# Patient Record
Sex: FEMALE | Race: BLACK OR AFRICAN AMERICAN | NOT HISPANIC OR LATINO | Employment: FULL TIME | URBAN - METROPOLITAN AREA
[De-identification: names, ages, dates, MRNs, and addresses within clinical notes are randomized per-mention and may not be internally consistent; named-entity substitution may affect disease eponyms.]

---

## 2017-09-25 ENCOUNTER — HOSPITAL ENCOUNTER (EMERGENCY)
Facility: HOSPITAL | Age: 27
Discharge: HOME | End: 2017-09-25
Attending: EMERGENCY MEDICINE
Payer: OTHER GOVERNMENT

## 2017-09-25 VITALS
BODY MASS INDEX: 19.24 KG/M2 | OXYGEN SATURATION: 99 % | WEIGHT: 98 LBS | HEART RATE: 84 BPM | HEIGHT: 60 IN | DIASTOLIC BLOOD PRESSURE: 71 MMHG | TEMPERATURE: 98 F | SYSTOLIC BLOOD PRESSURE: 121 MMHG | RESPIRATION RATE: 18 BRPM

## 2017-09-25 DIAGNOSIS — R07.89 CHEST PAIN, NON-CARDIAC: Primary | ICD-10-CM

## 2017-09-25 DIAGNOSIS — H81.10 VERTIGO, BENIGN POSITIONAL, UNSPECIFIED LATERALITY: ICD-10-CM

## 2017-09-25 DIAGNOSIS — R07.9 CHEST PAIN: ICD-10-CM

## 2017-09-25 LAB
B-HCG UR QL: NEGATIVE
CTP QC/QA: YES

## 2017-09-25 PROCEDURE — 99284 EMERGENCY DEPT VISIT MOD MDM: CPT | Mod: 25

## 2017-09-25 PROCEDURE — 81025 URINE PREGNANCY TEST: CPT | Performed by: EMERGENCY MEDICINE

## 2017-09-25 PROCEDURE — 93005 ELECTROCARDIOGRAM TRACING: CPT

## 2017-09-25 PROCEDURE — 93010 ELECTROCARDIOGRAM REPORT: CPT | Mod: ,,, | Performed by: INTERNAL MEDICINE

## 2017-09-25 RX ORDER — MECLIZINE HCL 12.5 MG 12.5 MG/1
12.5 TABLET ORAL 3 TIMES DAILY PRN
Qty: 20 TABLET | Refills: 0 | Status: SHIPPED | OUTPATIENT
Start: 2017-09-25 | End: 2019-08-29

## 2017-09-25 RX ORDER — IBUPROFEN 400 MG/1
400 TABLET ORAL EVERY 6 HOURS PRN
Qty: 20 TABLET | Refills: 0 | Status: SHIPPED | OUTPATIENT
Start: 2017-09-25 | End: 2021-04-13

## 2017-09-25 NOTE — ED TRIAGE NOTES
Chest Pain: Patient complains of chest pain. Onset was 3 days ago, with improving course since that time. The patient describes the pain as intermittent, pressure like, sharp and substernal in nature, does not radiate. Patient rates pain as a 4/10 in intensity.  Associated symptoms are chest pain, chest pressure/discomfort, dyspnea, near-syncope and palpitations. Aggravating factors are housework.  Alleviating factors are: position change and rest. Patient's cardiac risk factors are none.  Patient's risk factors for DVT/PE: none. Previous cardiac testing: none.

## 2017-09-25 NOTE — ED PROVIDER NOTES
Encounter Date: 9/25/2017    SCRIBE #1 NOTE: I, Zakia Negron, am scribing for, and in the presence of,  Odin Chatman MD. I have scribed the following portions of the note - Other sections scribed: ROS and HPI.       History     Chief Complaint   Patient presents with    Chest Pain     pt states midsternal cp with sob times 3 days, pt reports history of TB     CC: Chest Pain    HPI: This 26 y.o. female with a past medical history of Abnormal Pap smear and Condyloma, presents to the ED complaining of an intermittent midsternal chest pain for last 3 days. Pain is sharp, shooting up her mid chest, lasting only few seconds at a time. She notes SOB with her CP last night but currently denies it. She reports mild frontal headache with associated spinning worse with leaning over or movement for last x1 week. She denies cough, SOB, diaphoresis, N/V or any other associated sx. She denies hx of blood clots. She is not a smoker. No prior similar episodes.      The history is provided by the patient. No  was used.     Review of patient's allergies indicates:   Allergen Reactions    Phenergan vc [promethazine-phenylephrine] Anxiety    Percocet [oxycodone-acetaminophen]      Past Medical History:   Diagnosis Date    Abnormal Pap smear     Condyloma     Tuberculosis      Past Surgical History:   Procedure Laterality Date    NO PAST SURGERIES       Family History   Problem Relation Age of Onset    Hypertension Paternal Grandmother     Diabetes Paternal Grandmother     Hypertension Father     Diabetes Mother     Ovarian cancer Neg Hx     Colon cancer Neg Hx     Breast cancer Neg Hx     Stroke Neg Hx      Social History   Substance Use Topics    Smoking status: Never Smoker    Smokeless tobacco: Never Used    Alcohol use No     Review of Systems   Constitutional: Negative for chills and fever.   HENT: Negative for congestion, ear pain, rhinorrhea and sore throat.    Eyes: Negative for pain and  visual disturbance.   Respiratory: Negative for cough and shortness of breath.    Cardiovascular: Positive for chest pain (midsternal).   Gastrointestinal: Negative for abdominal pain, diarrhea, nausea and vomiting.   Genitourinary: Negative for dysuria.   Musculoskeletal: Negative for back pain and neck pain.   Skin: Negative for rash.   Neurological: Positive for headaches (frontal).       Physical Exam     Initial Vitals [09/25/17 1020]   BP Pulse Resp Temp SpO2   103/69 103 18 98.4 °F (36.9 °C) 99 %      MAP       80.33         Physical Exam  The patient was examined specifically for the following:   General:No significant distress, Good color, Warm and dry. Head and neck:Scalp atraumatic, Neck supple. Neurological:Appropriate conversation, Gross motor deficits. Eyes:Conjugate gaze, Clear corneas. ENT: No epistaxis. Cardiac: Regular rate and rhythm, Grossly normal heart tones. Pulmonary: Wheezing, Rales. Gastrointestinal: Abdominal tenderness, Abdominal distention. Musculoskeletal: Extremity deformity, Apparent pain with range of motion of the joints. Skin: Rash.   The findings on examination were normal.  The lungs are clear.  The heart tones are normal.  The abdomen is soft.  The neck is supple.  Neurologic examination is normal.  Tympanic membranes are clear.  Vital signs are stable.  ED Course   Procedures  Labs Reviewed   POCT URINE PREGNANCY     EKG Readings: (Independently Interpreted)   This patient is in a normal sinus rhythm with a heart rate of 81.  The GA QRS and QT intervals are normal.  This is a normal EKG.       X-Rays:   Independently Interpreted Readings:   Other Readings:  Chest x-ray fails to reveal pneumothorax pneumonia pleural effusion.    Medical decision making: Given the above, this patient presents to emergency room with chest pain.  Sharp and worse with deep breathing but there is no shortness of breath or tachycardia.  I doubt pulmonary embolus.  Chest x-ray fails to reveal  pneumothorax pneumonia pleural effusion.  The history is not consistent with coronary insufficiency.  I doubt myocardial infarction pericarditis.  Patient also has some vertigo.  It is benign and positional.  It occurs with bending over and standing up.  It is been present for a week.  I doubt stroke.  The patient has no other disequilibrium.  She has no hearing deficits.  I will refer her to follow-up with primary care.             Scribe Attestation:   Scribe #1: I performed the above scribed service and the documentation accurately describes the services I performed. I attest to the accuracy of the note.    Attending Attestation:           Physician Attestation for Scribe:  Physician Attestation Statement for Scribe #1: I, Odin Chatman MD, reviewed documentation, as scribed by Zakia Negron in my presence, and it is both accurate and complete.                 ED Course      Clinical Impression:   The primary encounter diagnosis was Chest pain, non-cardiac. Diagnoses of Chest pain and Vertigo, benign positional, unspecified laterality were also pertinent to this visit.                           Odin Chatman MD  09/25/17 8913

## 2017-09-25 NOTE — DISCHARGE INSTRUCTIONS
Meclizine for vertigo.  Ibuprofen for chest pain.  Please return immediately if you get worse or if new problems develop.  Rest.  Please make an appointment to see a primary care doctor this week.  You may see the doctor above.

## 2017-09-26 ENCOUNTER — HOSPITAL ENCOUNTER (EMERGENCY)
Facility: HOSPITAL | Age: 27
Discharge: HOME | End: 2017-09-26
Attending: EMERGENCY MEDICINE
Payer: OTHER GOVERNMENT

## 2017-09-26 VITALS
WEIGHT: 98 LBS | SYSTOLIC BLOOD PRESSURE: 100 MMHG | HEIGHT: 60 IN | BODY MASS INDEX: 19.24 KG/M2 | TEMPERATURE: 99 F | DIASTOLIC BLOOD PRESSURE: 64 MMHG | RESPIRATION RATE: 17 BRPM | OXYGEN SATURATION: 100 % | HEART RATE: 86 BPM

## 2017-09-26 DIAGNOSIS — R07.89 CHEST PAIN, NON-CARDIAC: Primary | ICD-10-CM

## 2017-09-26 LAB
ALBUMIN SERPL BCP-MCNC: 3.9 G/DL
ALP SERPL-CCNC: 63 U/L
ALT SERPL W/O P-5'-P-CCNC: 10 U/L
ANION GAP SERPL CALC-SCNC: 10 MMOL/L
APTT BLDCRRT: 27.6 SEC
AST SERPL-CCNC: 14 U/L
BASOPHILS # BLD AUTO: 0.03 K/UL
BASOPHILS NFR BLD: 0.5 %
BILIRUB SERPL-MCNC: 0.6 MG/DL
BUN SERPL-MCNC: 12 MG/DL
CALCIUM SERPL-MCNC: 9.5 MG/DL
CHLORIDE SERPL-SCNC: 108 MMOL/L
CO2 SERPL-SCNC: 20 MMOL/L
CREAT SERPL-MCNC: 0.8 MG/DL
D DIMER PPP IA.FEU-MCNC: 0.41 MG/L FEU
DIFFERENTIAL METHOD: ABNORMAL
EOSINOPHIL # BLD AUTO: 0.5 K/UL
EOSINOPHIL NFR BLD: 9.3 %
ERYTHROCYTE [DISTWIDTH] IN BLOOD BY AUTOMATED COUNT: 14.1 %
EST. GFR  (AFRICAN AMERICAN): >60 ML/MIN/1.73 M^2
EST. GFR  (NON AFRICAN AMERICAN): >60 ML/MIN/1.73 M^2
GLUCOSE SERPL-MCNC: 77 MG/DL
HCT VFR BLD AUTO: 40.4 %
HGB BLD-MCNC: 13.7 G/DL
INR PPP: 1
LYMPHOCYTES # BLD AUTO: 1.7 K/UL
LYMPHOCYTES NFR BLD: 29.9 %
MCH RBC QN AUTO: 29 PG
MCHC RBC AUTO-ENTMCNC: 33.9 G/DL
MCV RBC AUTO: 85 FL
MONOCYTES # BLD AUTO: 0.6 K/UL
MONOCYTES NFR BLD: 10 %
NEUTROPHILS # BLD AUTO: 2.9 K/UL
NEUTROPHILS NFR BLD: 50.1 %
PLATELET # BLD AUTO: 257 K/UL
PMV BLD AUTO: 10 FL
POTASSIUM SERPL-SCNC: 3.5 MMOL/L
PROT SERPL-MCNC: 8 G/DL
PROTHROMBIN TIME: 10.7 SEC
RBC # BLD AUTO: 4.73 M/UL
SODIUM SERPL-SCNC: 138 MMOL/L
WBC # BLD AUTO: 5.72 K/UL

## 2017-09-26 PROCEDURE — 93010 ELECTROCARDIOGRAM REPORT: CPT | Mod: ,,, | Performed by: INTERNAL MEDICINE

## 2017-09-26 PROCEDURE — 85730 THROMBOPLASTIN TIME PARTIAL: CPT

## 2017-09-26 PROCEDURE — 85379 FIBRIN DEGRADATION QUANT: CPT

## 2017-09-26 PROCEDURE — 85610 PROTHROMBIN TIME: CPT

## 2017-09-26 PROCEDURE — 99284 EMERGENCY DEPT VISIT MOD MDM: CPT

## 2017-09-26 PROCEDURE — 85025 COMPLETE CBC W/AUTO DIFF WBC: CPT

## 2017-09-26 PROCEDURE — 80053 COMPREHEN METABOLIC PANEL: CPT

## 2017-09-26 NOTE — ED PROVIDER NOTES
Encounter Date: 9/26/2017    SCRIBE #1 NOTE: I, Zakia Negron, am scribing for, and in the presence of,  Odin Chatman MD. I have scribed the following portions of the note - Other sections scribed: ROS and HPI.       History     Chief Complaint   Patient presents with    Chest Pain     Pt presents to ER via EMS with c/o chest pain.  EMS was called out for chest pain while pt was at work.  EMS reports chest pain is reproducible.  Upon arrival, EMS reports pt was hyperventilating, tachypnic, tearful, and anxious.  Pt reports she became dizzy and hands and feet went numb.     CC: Chest Pain  HPI: This 26 y.o. female with a past medical history of Abnormal Pap smear; Condyloma; and Tuberculosis, presents to the ED via EMS complaining of mid sternal chest pain with associated SOB that began acutely while she was at work this morning. EMS states the pt was hyperventilating, tachypnic, anxious, and tearful upon arrival at the scene. The pt states CP only lasted for few seconds, she became frightened and dizzy, her hands and feet went numb. Patient was evaluated yesterday in this ER for her CP and had a normal EKG and chest X-ray. She states her CP was resolved and came back this morning.  No prior medical intervention. No exacerbating or alleviating factors are reported.      The history is provided by the patient. No  was used.     Review of patient's allergies indicates:   Allergen Reactions    Phenergan vc [promethazine-phenylephrine] Anxiety    Percocet [oxycodone-acetaminophen]      Past Medical History:   Diagnosis Date    Abnormal Pap smear     Condyloma     Tuberculosis      Past Surgical History:   Procedure Laterality Date    NO PAST SURGERIES       Family History   Problem Relation Age of Onset    Hypertension Paternal Grandmother     Diabetes Paternal Grandmother     Hypertension Father     Diabetes Mother     Ovarian cancer Neg Hx     Colon cancer Neg Hx     Breast cancer Neg  Hx     Stroke Neg Hx      Social History   Substance Use Topics    Smoking status: Never Smoker    Smokeless tobacco: Never Used    Alcohol use No     Review of Systems   Constitutional: Negative for chills and fever.   HENT: Negative for congestion, ear pain, rhinorrhea and sore throat.    Eyes: Negative for pain and visual disturbance.   Respiratory: Positive for shortness of breath (resolved). Negative for cough.    Cardiovascular: Positive for chest pain (mid sternal).   Gastrointestinal: Negative for abdominal pain, diarrhea, nausea and vomiting.   Genitourinary: Negative for dysuria.   Musculoskeletal: Negative for back pain and neck pain.   Skin: Negative for rash.   Neurological: Positive for dizziness (resloved) and numbness (hands and feet). Negative for headaches.       Physical Exam     Initial Vitals [09/26/17 0954]   BP Pulse Resp Temp SpO2   112/61 99 (!) 24 98.6 °F (37 °C) 100 %      MAP       78         Physical Exam  The patient was examined specifically for the following:   General:No significant distress, Good color, Warm and dry. Head and neck:Scalp atraumatic, Neck supple. Neurological:Appropriate conversation, Gross motor deficits. Eyes:Conjugate gaze, Clear corneas. ENT: No epistaxis. Cardiac: Regular rate and rhythm, Grossly normal heart tones. Pulmonary: Wheezing, Rales. Gastrointestinal: Abdominal tenderness, Abdominal distention. Musculoskeletal: Extremity deformity, Apparent pain with range of motion of the joints. Skin: Rash.   The findings on examination were normal except for the following: The chest is nontender.  The lungs are clear.  Heart tones are normal.  The patient has a regular rate and rhythm.  The abdomen is soft.  ED Course   Procedures  Labs Reviewed   COMPREHENSIVE METABOLIC PANEL - Abnormal; Notable for the following:        Result Value    CO2 20 (*)     All other components within normal limits   CBC W/ AUTO DIFFERENTIAL - Abnormal; Notable for the following:      Eosinophil% 9.3 (*)     All other components within normal limits   PROTIME-INR   APTT   D DIMER, QUANTITATIVE     EKG Readings: (Independently Interpreted)   Patient is in a normal sinus rhythm with a heart rate of 85.  The AK QRS and QT intervals are normal.  There is no evidence of acute myocardial infarction or malignant arrhythmia.       Medical decision making: Given the above, this patient developed some sharp stabbing chest pain this morning.  She was here recently for the same complaint.  A chest x-ray at that time was negative.  The patient's EKG fails to reveal significant abnormalities.  Laboratory evaluation is normal including a negative d-dimer.  I doubt pulmonary embolus.  The patient is not tachycardic.  She was short of rash is not short of breath now.  I will discharge her to outpatient evaluation and treatment.                 Scribe Attestation:   Scribe #1: I performed the above scribed service and the documentation accurately describes the services I performed. I attest to the accuracy of the note.    Attending Attestation:           Physician Attestation for Scribe:  Physician Attestation Statement for Scribe #1: I, Odin Chatman MD, reviewed documentation, as scribed by Zakia Negron in my presence, and it is both accurate and complete.                 ED Course      Clinical Impression:   The encounter diagnosis was Chest pain, non-cardiac.                           Odin Chatman MD  09/26/17 8818

## 2017-09-26 NOTE — DISCHARGE INSTRUCTIONS
Please continue your ibuprofen and meclizine.  Please return if you get worse or if new problems develop.  Please follow-up with primary care this week.  Rest.

## 2018-04-12 ENCOUNTER — TELEPHONE (OUTPATIENT)
Dept: OBSTETRICS AND GYNECOLOGY | Facility: CLINIC | Age: 28
End: 2018-04-12

## 2018-04-12 NOTE — TELEPHONE ENCOUNTER
----- Message from Dhiraj Swift sent at 4/12/2018 12:53 PM CDT -----  Contact: patient            Name of Who is Calling: Barbie      What is the request in detail: patient is requesting a call back from Barb in reference to a personal issue.      Can the clinic reply by [facility]: no      What [facility] to [facility] Back [facility]: [phone]

## 2018-04-13 NOTE — TELEPHONE ENCOUNTER
Patient advised per Dr Sanchez not to take the PLan B if she could not tolerate the OCP in the past. Patient verbalized understanding  And will comply

## 2018-10-23 ENCOUNTER — OFFICE VISIT (OUTPATIENT)
Dept: OBSTETRICS AND GYNECOLOGY | Facility: CLINIC | Age: 28
End: 2018-10-23
Attending: OBSTETRICS & GYNECOLOGY
Payer: OTHER GOVERNMENT

## 2018-10-23 VITALS — SYSTOLIC BLOOD PRESSURE: 102 MMHG | WEIGHT: 98.56 LBS | BODY MASS INDEX: 19.25 KG/M2 | DIASTOLIC BLOOD PRESSURE: 70 MMHG

## 2018-10-23 DIAGNOSIS — Z12.4 PAP SMEAR FOR CERVICAL CANCER SCREENING: ICD-10-CM

## 2018-10-23 DIAGNOSIS — Z01.419 WELL WOMAN EXAM WITH ROUTINE GYNECOLOGICAL EXAM: Primary | ICD-10-CM

## 2018-10-23 PROCEDURE — 99212 OFFICE O/P EST SF 10 MIN: CPT | Mod: PBBFAC | Performed by: OBSTETRICS & GYNECOLOGY

## 2018-10-23 PROCEDURE — 99395 PREV VISIT EST AGE 18-39: CPT | Mod: S$PBB,,, | Performed by: OBSTETRICS & GYNECOLOGY

## 2018-10-23 PROCEDURE — 88175 CYTOPATH C/V AUTO FLUID REDO: CPT

## 2018-10-23 PROCEDURE — 99999 PR PBB SHADOW E&M-EST. PATIENT-LVL II: CPT | Mod: PBBFAC,,, | Performed by: OBSTETRICS & GYNECOLOGY

## 2018-10-23 NOTE — PROGRESS NOTES
Barbie Kumar is a 27 y.o. female  who presents for annual exam.  Menses occur monthly, lasting 4-5 days in duration.  No intermenstrual bleeding.  Her  is currently deployed and contraception is not an issue at this time.  Denies recent changes in medical / surgical history.  No GYN complaints.   Patient's last menstrual period was 2018.       Past Medical History:   Diagnosis Date    Abnormal Pap smear     Condyloma     Tuberculosis        Past Surgical History:   Procedure Laterality Date    NO PAST SURGERIES         OB History      Para Term  AB Living    1 1 1     1    SAB TAB Ectopic Multiple Live Births          0 1          ROS:  GENERAL: Feeling well overall.   SKIN: Denies rash or lesions.   HEAD: Denies head injury or headache.   NODES: Denies enlarged lymph nodes.   CHEST: Denies chest pain or shortness of breath.   CARDIOVASCULAR: Denies palpitations or left sided chest pain.   ABDOMEN: No abdominal pain, nausea, vomiting or rectal bleeding.   URINARY: No dysuria or hematuria.  REPRODUCTIVE: See HPI.   BREASTS: Denies pain, lumps, or nipple discharge.   HEMATOLOGIC: No easy bruisability or excessive bleeding.   MUSCULOSKELETAL: Denies joint pain or swelling.   NEUROLOGIC: Denies syncope or weakness.   PSYCHIATRIC: Denies depression.    PE:   (chaperone present during entire exam)  APPEARANCE: Well nourished, well developed, in no acute distress.  BREASTS: Symmetrical, no skin changes or visible lesions. No palpable masses, nipple discharge or adenopathy bilaterally.  ABDOMEN: Soft. No tenderness or masses. No hernias. No CVA tenderness.  VULVA: Normal female genitalia. Small area of folliculitis right labia major.  URETHRAL MEATUS: Normal size and location, no lesions, no prolapse.  URETHRA: No masses, tenderness, prolapse or scarring.  VAGINA: Moist and well rugated, no discharge, no significant cystocele or rectocele.  CERVIX: No lesions and discharge. PAP  done.  UTERUS: Normal size, regular shape, mobile, non-tender, bladder base nontender.  ADNEXA: No masses, tenderness or CDS nodularity.  ANUS PERINEUM: Normal.      Diagnosis:  1. Well woman exam with routine gynecological exam    2. Pap smear for cervical cancer screening          PLAN:    Orders Placed This Encounter    Liquid-based pap smear, screening       Patient was counseled today on the need for annual gyn exams.    Follow-up in 1 year.

## 2018-10-29 ENCOUNTER — PATIENT MESSAGE (OUTPATIENT)
Dept: OBSTETRICS AND GYNECOLOGY | Facility: CLINIC | Age: 28
End: 2018-10-29

## 2019-02-24 ENCOUNTER — HOSPITAL ENCOUNTER (EMERGENCY)
Facility: HOSPITAL | Age: 29
Discharge: HOME | End: 2019-02-24
Attending: EMERGENCY MEDICINE
Payer: OTHER GOVERNMENT

## 2019-02-24 VITALS
TEMPERATURE: 98 F | WEIGHT: 101 LBS | BODY MASS INDEX: 19.83 KG/M2 | HEART RATE: 108 BPM | RESPIRATION RATE: 16 BRPM | DIASTOLIC BLOOD PRESSURE: 74 MMHG | OXYGEN SATURATION: 99 % | SYSTOLIC BLOOD PRESSURE: 127 MMHG | HEIGHT: 60 IN

## 2019-02-24 DIAGNOSIS — M54.9 BACK PAIN, UNSPECIFIED BACK LOCATION, UNSPECIFIED BACK PAIN LATERALITY, UNSPECIFIED CHRONICITY: Primary | ICD-10-CM

## 2019-02-24 LAB
B-HCG UR QL: NEGATIVE
BILIRUB UR QL STRIP: NEGATIVE
CLARITY UR: CLEAR
COLOR UR: YELLOW
CTP QC/QA: YES
GLUCOSE UR QL STRIP: NEGATIVE
HGB UR QL STRIP: ABNORMAL
KETONES UR QL STRIP: NEGATIVE
LEUKOCYTE ESTERASE UR QL STRIP: ABNORMAL
MICROSCOPIC COMMENT: ABNORMAL
NITRITE UR QL STRIP: NEGATIVE
PH UR STRIP: 5 [PH] (ref 5–8)
PROT UR QL STRIP: NEGATIVE
RBC #/AREA URNS HPF: >100 /HPF (ref 0–4)
SP GR UR STRIP: 1.02 (ref 1–1.03)
SQUAMOUS #/AREA URNS HPF: 7 /HPF
URN SPEC COLLECT METH UR: ABNORMAL
UROBILINOGEN UR STRIP-ACNC: NEGATIVE EU/DL
WBC #/AREA URNS HPF: 2 /HPF (ref 0–5)

## 2019-02-24 PROCEDURE — 25000003 PHARM REV CODE 250: Performed by: PHYSICIAN ASSISTANT

## 2019-02-24 PROCEDURE — 96372 THER/PROPH/DIAG INJ SC/IM: CPT | Mod: 59

## 2019-02-24 PROCEDURE — 81025 URINE PREGNANCY TEST: CPT | Performed by: EMERGENCY MEDICINE

## 2019-02-24 PROCEDURE — 99284 EMERGENCY DEPT VISIT MOD MDM: CPT

## 2019-02-24 PROCEDURE — 63600175 PHARM REV CODE 636 W HCPCS: Performed by: PHYSICIAN ASSISTANT

## 2019-02-24 PROCEDURE — 81000 URINALYSIS NONAUTO W/SCOPE: CPT

## 2019-02-24 RX ORDER — KETOROLAC TROMETHAMINE 30 MG/ML
30 INJECTION, SOLUTION INTRAMUSCULAR; INTRAVENOUS
Status: COMPLETED | OUTPATIENT
Start: 2019-02-24 | End: 2019-02-24

## 2019-02-24 RX ORDER — METHOCARBAMOL 500 MG/1
1000 TABLET, FILM COATED ORAL 3 TIMES DAILY
Qty: 30 TABLET | Refills: 0 | Status: SHIPPED | OUTPATIENT
Start: 2019-02-24 | End: 2019-03-01

## 2019-02-24 RX ORDER — METHOCARBAMOL 500 MG/1
1500 TABLET, FILM COATED ORAL
Status: COMPLETED | OUTPATIENT
Start: 2019-02-24 | End: 2019-02-24

## 2019-02-24 RX ADMIN — METHOCARBAMOL TABLETS 1500 MG: 500 TABLET, COATED ORAL at 07:02

## 2019-02-24 RX ADMIN — KETOROLAC TROMETHAMINE 30 MG: 30 INJECTION, SOLUTION INTRAMUSCULAR at 07:02

## 2019-02-24 NOTE — ED PROVIDER NOTES
Encounter Date: 2/24/2019    SCRIBE #1 NOTE: I, Shyanne Soliz, am scribing for, and in the presence of,  Genie Mccain PA-C. I have scribed the following portions of the note - Other sections scribed: HPI/ROS.       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 female presents to the ED for an emergent evaluation of lower back pain that initially began yesterday around 7:00 PM. Rates as 8/10. Describes pain as achy and throbbing and has been constant since onset. States she always had back problems since teenage years. Bending down exacerbates pain. No relevant medical problems. No alleviating factors. Denies back traumas. Reports having an epidural for recent birth. She took Advil yesterday around noon for abdominal cramps but no OTC medications taken for back pain. Otherwise, pt denies fever, chills, dysuria, urgency, frequency, n/v/d, other urinary symptoms, and any other associated symptoms.       The history is provided by the patient. No  was used.     Review of patient's allergies indicates:   Allergen Reactions    Phenergan vc [promethazine-phenylephrine] Anxiety    Percocet [oxycodone-acetaminophen] Anxiety     Past Medical History:   Diagnosis Date    Abnormal Pap smear     Condyloma     Tuberculosis      Past Surgical History:   Procedure Laterality Date    NO PAST SURGERIES       Family History   Problem Relation Age of Onset    Hypertension Paternal Grandmother     Diabetes Paternal Grandmother     Hypertension Father     Diabetes Mother     Ovarian cancer Neg Hx     Colon cancer Neg Hx     Breast cancer Neg Hx     Stroke Neg Hx      Social History     Tobacco Use    Smoking status: Never Smoker     Smokeless tobacco: Never Used   Substance Use Topics    Alcohol use: No     Alcohol/week: 0.0 oz    Drug use: No     Review of Systems   Constitutional: Negative for chills and fever.   HENT: Negative for congestion, ear pain, rhinorrhea and sore throat.    Eyes: Negative for pain and visual disturbance.   Respiratory: Negative for cough and shortness of breath.    Cardiovascular: Negative for chest pain.   Gastrointestinal: Negative for abdominal pain, diarrhea, nausea and vomiting.   Genitourinary: Negative for dysuria.   Musculoskeletal: Positive for back pain. Negative for neck pain.   Skin: Negative for rash.   Neurological: Negative for headaches.   All other systems reviewed and are negative.      Physical Exam     Initial Vitals [02/24/19 0623]   BP Pulse Resp Temp SpO2   127/74 108 16 98 °F (36.7 °C) 99 %      MAP       --         Physical Exam  Physical Exam    Nursing note and vitals reviewed.  Constitutional: She appears well-developed and well-nourished. She is not diaphoretic. No distress.   HENT:   Head: Normocephalic and atraumatic.   Nose: Nose normal.   Eyes: EOM are normal. Pupils are equal, round, and reactive to light.   Neck: Normal range of motion. Neck supple.   Cardiovascular: Normal rate and regular rhythm.   No murmur heard.  Pulmonary/Chest: Breath sounds normal. No respiratory distress. She has no wheezes. She has no rhonchi. She has no rales.   Abdominal: Soft. Bowel sounds are normal. She exhibits no distension. There is no tenderness. There is no rebound and no guarding.   Musculoskeletal: Normal range of motion. She exhibits no edema or tenderness.   The patient has a normal, steady gait. There is mild tenderness to palpation of the paraspinal region of the lumbar spine.  There is no midlines spinal tenderness or bony step off noted.  No saddle anesthesia.  She has ROM, strength and sensation in all extremities.   Neurological: She is alert and oriented to person, place, and  time. No cranial nerve deficit.   Skin: Skin is warm. No rash noted. No erythema.       ED Course   Procedures  Labs Reviewed   URINALYSIS, REFLEX TO URINE CULTURE - Abnormal; Notable for the following components:       Result Value    Occult Blood UA 3+ (*)     Leukocytes, UA Trace (*)     All other components within normal limits    Narrative:     Preferred Collection Type->Urine, Clean Catch   URINALYSIS MICROSCOPIC - Abnormal; Notable for the following components:    RBC, UA >100 (*)     All other components within normal limits    Narrative:     Preferred Collection Type->Urine, Clean Catch   POCT URINE PREGNANCY          Imaging Results    None                APC / Resident Notes:   This an urgent evaluation of a 28-year-old female who presents to the emergency department complaining of atraumatic back pain. 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    Patient is currently afebrile and nontoxic in appearance.  Her vital signs are stable. On physical exam, there is thoracic and lumbar paraspinal muscle tenderness to palpation.  There is no midline spinal step-off or tenderness to palpation.  There is no saddle anesthesia.  I carefully considered but doubt fracture, acute disc herniation with deficit, cauda equina, epidural abscess.  There is no CVA tenderness noted.  A urinalysis revealed 3+ blood and trace leukocytes.  Microscopic UA reveals 2 white blood cells.  The patient is currently on her period.  She denies any urinary symptoms. I do not believe she has urinary tract infection.  I will treat this patient with Toradol and Robaxin in the Emergency Department send her home with a prescription of Robaxin.  Since the patient has a history of back pain in the past I will encourage her to follow up with a spine specialist.  She verbalized understanding agreement.  I believe she is currently safe and stable for discharge at this time.       Diana  Attestation:   Scribe #1: I performed the above scribed service and the documentation accurately describes the services I performed. I attest to the accuracy of the note.    Attending Attestation:           Physician Attestation for Scribe:  Physician Attestation Statement for Scribe #1: I, Genie Mccain PA-C, reviewed documentation, as scribed by Shyanne Soliz in my presence, and it is both accurate and complete.                    Clinical Impression:       ICD-10-CM ICD-9-CM   1. Back pain, unspecified back location, unspecified back pain laterality, unspecified chronicity M54.9 724.5         Disposition:   Disposition: Discharged  Condition: Stable                        Genie Mccain PA-C  02/24/19 0920

## 2019-02-24 NOTE — DISCHARGE INSTRUCTIONS
Take medicationAs prescribed.  Follow up with a spine specialist as indicated.  Return to the emergency department if he develops worsening pain, numbness, tingling or inability to urinate.  Use ice and heat to the area for pain relief.

## 2019-02-24 NOTE — ED TRIAGE NOTES
The pt states on Saturday was working and lifting which contributed to her lower back pain. Reports last night it got worse.

## 2019-02-27 NOTE — PROGRESS NOTES
Subjective:      Patient ID: Barbie Kumar is a 28 y.o. female.    Chief Complaint: Low-back Pain      HPI  (Ria)    History of chronic back pain since she was a teenager. She is otherwise healthy. She is seeing cardiology and getting ready to wear a monitor for a few weeks for tachycardia and palpitations.     Seen in ED on 2/24/19 with back pain. Given toradol injection and robaxin.     History of chronic intermittent LBP x years (since she was a child). Now with 1 week history of constant LBP with radiation into her butt. No leg pain. No specific aggravating factors. When pain gets worse, she has to sit down as she feels like her legs with give way. Pain has been worse at night too with severe aching and burning. She had some improvement with toradol injection. She rates her pain as a 2 on a scale of 1-10. No numbness, tingling, or weakness. She has intermittent spasms.     She had improvement with toradol injection. Robaxin helps but she does not like the side effects. Minimal relief with OTC motrin. No PT, lumbar injections, or lumbar surgery.       Review of Systems   Constitution: Positive for weakness, malaise/fatigue and weight gain. Negative for fever, night sweats and weight loss.   HENT: Negative for hearing loss, nosebleeds and odynophagia.    Eyes: Negative for blurred vision and double vision.   Cardiovascular: Positive for chest pain. Negative for irregular heartbeat and palpitations.   Respiratory: Negative for cough, hemoptysis, shortness of breath and wheezing.    Endocrine: Negative for cold intolerance and polydipsia.   Hematologic/Lymphatic: Does not bruise/bleed easily.   Skin: Negative for dry skin, poor wound healing, rash and suspicious lesions.   Musculoskeletal: Positive for back pain.        See HPI for pertinent positives.   Gastrointestinal: Negative for bloating, abdominal pain, constipation, diarrhea, hematochezia, melena, nausea and vomiting.   Genitourinary: Negative for  bladder incontinence, dysuria, hematuria, hesitancy and incomplete emptying.   Neurological: Negative for disturbances in coordination, dizziness, focal weakness, headaches, loss of balance, numbness, paresthesias and seizures.   Psychiatric/Behavioral: Negative for depression and hallucinations. The patient is not nervous/anxious.            Objective:        General: Barbie is well-developed, well-nourished, appears stated age, in no acute distress, alert and oriented to time, place and person.     General    Vitals reviewed.  Constitutional: She is oriented to person, place, and time. She appears well-developed and well-nourished.   Pulmonary/Chest: Effort normal.   Abdominal: She exhibits no distension.   Neurological: She is alert and oriented to person, place, and time.   Psychiatric: She has a normal mood and affect. Her behavior is normal. Judgment and thought content normal.           Gait: normal, tandem walking is normal and she is able to heel/toe stand.     On exam of the lumbar spine, Inspection of back is normal, mild diffuse lower lumbar tenderness.     Skin in lumbar region is warm to the touch without visible rashes.     muscle tone normal without spasm, limited range of motion with pain  Pain in flexion. and Pain in extension.    Strength testing of the bilateral LEs shows  Right hip abduction:  +5/5  Left hip abduction:  +5/5  Right hip flexion:  +5/5  Left hip flexion:  +5/5  Right hip extensors:  +5/5  Left hip extensors:  +5/5  Right quadriceps:  +5/5  Left quadriceps:  +5/5  Right hamstring:  +5/5  Left hamstring:  +5/5  Right dorsiflexion:  +5/5  Left dorsiflexion:  +5/5  Right plantar flexion:  +5/5  Left plantar flexion:  +5/5   Right EHL:  +5/5   Left EHL:  +5/5    negative clonus of bilateral LEs.     negative straight leg raise on bilateral LEs.     DTRs:  Right patellar:  +2     Left patellar:  +2  Right achilles:  +2   Left achilles:  +2    Sensation is grossly intact in L2, L3, L4,  L5, and S1 distribution.    Right hip has no pain with IR/ER. Left hip has no pain with IR/ER.      On exam of bilateral UEs, patient has full painfree ROM with no signs of clubbing, laxity, cyanosis, edema, instability, weakness, or tenderness.       XRAY INTERPRETATION:  X-rays of lumbar spine (AP/lat/flex/ext) dated 3/1/19 are personally reviewed and show exaggeration of normal lumbar lordosis.         Assessment:       1. Chronic bilateral low back pain without sciatica    2. Myofascial muscle pain           Plan:       Orders Placed This Encounter    Ambulatory referral to Physical Therapy - Lumbar    methylPREDNISolone (MEDROL DOSEPACK) 4 mg tablet       History of chronic intermittent LBP x years (since she was a child). Now with 1 week history of constant LBP with radiation into her butt. No leg pain. Pain has improved with toradol injection, but is still present. Known exaggeration of normal lumbar lordosis. Pain appears more myofascial in nature. Treatment options reviewed with patient along with above lumbar XRs. Following plan made:     - Medrol dose pack for symptom relief. Reviewed dosing and side effects.   - PT for lumbar spine with good HEP. External orders given.   - If pain persists, consider lumbar MRI and possible injections with pain management.     Follow-up: Follow-up in 3 months (on 6/1/2019). If there are any questions prior to this, the patient was instructed to contact the office.

## 2019-02-28 ENCOUNTER — TELEPHONE (OUTPATIENT)
Dept: SPINE | Facility: CLINIC | Age: 29
End: 2019-02-28

## 2019-02-28 DIAGNOSIS — M54.50 LUMBAR PAIN: Primary | ICD-10-CM

## 2019-03-01 ENCOUNTER — OFFICE VISIT (OUTPATIENT)
Dept: SPINE | Facility: CLINIC | Age: 29
End: 2019-03-01
Payer: OTHER GOVERNMENT

## 2019-03-01 ENCOUNTER — HOSPITAL ENCOUNTER (OUTPATIENT)
Dept: RADIOLOGY | Facility: OTHER | Age: 29
Discharge: HOME | End: 2019-03-01
Attending: PHYSICIAN ASSISTANT
Payer: OTHER GOVERNMENT

## 2019-03-01 VITALS
TEMPERATURE: 98 F | WEIGHT: 101 LBS | SYSTOLIC BLOOD PRESSURE: 108 MMHG | HEART RATE: 99 BPM | HEIGHT: 60 IN | DIASTOLIC BLOOD PRESSURE: 62 MMHG | BODY MASS INDEX: 19.83 KG/M2

## 2019-03-01 DIAGNOSIS — M79.18 MYOFASCIAL MUSCLE PAIN: ICD-10-CM

## 2019-03-01 DIAGNOSIS — G89.29 CHRONIC BILATERAL LOW BACK PAIN WITHOUT SCIATICA: Primary | ICD-10-CM

## 2019-03-01 DIAGNOSIS — M54.50 CHRONIC BILATERAL LOW BACK PAIN WITHOUT SCIATICA: Primary | ICD-10-CM

## 2019-03-01 DIAGNOSIS — M54.50 LUMBAR PAIN: ICD-10-CM

## 2019-03-01 PROCEDURE — 99204 PR OFFICE/OUTPT VISIT, NEW, LEVL IV, 45-59 MIN: ICD-10-PCS | Mod: S$PBB,,, | Performed by: PHYSICIAN ASSISTANT

## 2019-03-01 PROCEDURE — 72100 XR LUMBAR SPINE AP AND LAT WITH FLEX/EXT: ICD-10-PCS | Mod: 26,,, | Performed by: RADIOLOGY

## 2019-03-01 PROCEDURE — 72120 X-RAY BEND ONLY L-S SPINE: CPT | Mod: 26,,, | Performed by: RADIOLOGY

## 2019-03-01 PROCEDURE — 99999 PR PBB SHADOW E&M-EST. PATIENT-LVL IV: CPT | Mod: PBBFAC,,, | Performed by: PHYSICIAN ASSISTANT

## 2019-03-01 PROCEDURE — 72100 X-RAY EXAM L-S SPINE 2/3 VWS: CPT | Mod: 26,,, | Performed by: RADIOLOGY

## 2019-03-01 PROCEDURE — 99214 OFFICE O/P EST MOD 30 MIN: CPT | Mod: PBBFAC,25 | Performed by: PHYSICIAN ASSISTANT

## 2019-03-01 PROCEDURE — 99999 PR PBB SHADOW E&M-EST. PATIENT-LVL IV: ICD-10-PCS | Mod: PBBFAC,,, | Performed by: PHYSICIAN ASSISTANT

## 2019-03-01 PROCEDURE — 72120 X-RAY BEND ONLY L-S SPINE: CPT | Mod: TC,FY

## 2019-03-01 PROCEDURE — 99204 OFFICE O/P NEW MOD 45 MIN: CPT | Mod: S$PBB,,, | Performed by: PHYSICIAN ASSISTANT

## 2019-03-01 PROCEDURE — 72120 XR LUMBAR SPINE AP AND LAT WITH FLEX/EXT: ICD-10-PCS | Mod: 26,,, | Performed by: RADIOLOGY

## 2019-03-01 RX ORDER — METHYLPREDNISOLONE 4 MG/1
TABLET ORAL
Qty: 1 PACKAGE | Refills: 0 | Status: SHIPPED | OUTPATIENT
Start: 2019-03-01 | End: 2019-06-03

## 2019-04-23 ENCOUNTER — HOSPITAL ENCOUNTER (EMERGENCY)
Facility: HOSPITAL | Age: 29
Discharge: HOME | End: 2019-04-23
Attending: EMERGENCY MEDICINE
Payer: OTHER GOVERNMENT

## 2019-04-23 VITALS
TEMPERATURE: 99 F | BODY MASS INDEX: 19.24 KG/M2 | OXYGEN SATURATION: 99 % | HEART RATE: 98 BPM | HEIGHT: 60 IN | DIASTOLIC BLOOD PRESSURE: 63 MMHG | RESPIRATION RATE: 18 BRPM | WEIGHT: 98 LBS | SYSTOLIC BLOOD PRESSURE: 116 MMHG

## 2019-04-23 DIAGNOSIS — L02.91 ABSCESS: Primary | ICD-10-CM

## 2019-04-23 PROCEDURE — 25000003 PHARM REV CODE 250: Performed by: EMERGENCY MEDICINE

## 2019-04-23 PROCEDURE — 10060 I&D ABSCESS SIMPLE/SINGLE: CPT

## 2019-04-23 PROCEDURE — 99283 EMERGENCY DEPT VISIT LOW MDM: CPT | Mod: 25

## 2019-04-23 RX ORDER — LIDOCAINE HYDROCHLORIDE 10 MG/ML
5 INJECTION INFILTRATION; PERINEURAL
Status: COMPLETED | OUTPATIENT
Start: 2019-04-23 | End: 2019-04-23

## 2019-04-23 RX ORDER — SULFAMETHOXAZOLE AND TRIMETHOPRIM 800; 160 MG/1; MG/1
1 TABLET ORAL 2 TIMES DAILY
Qty: 14 TABLET | Refills: 0 | Status: SHIPPED | OUTPATIENT
Start: 2019-04-23 | End: 2019-04-30

## 2019-04-23 RX ADMIN — LIDOCAINE HYDROCHLORIDE 5 ML: 10 INJECTION, SOLUTION INFILTRATION; PERINEURAL at 06:04

## 2019-04-23 NOTE — ED PROVIDER NOTES
Encounter Date: 4/23/2019    SCRIBE #1 NOTE: I, Milli Pritchard, am scribing for, and in the presence of,  Mateo Reed MD. I have scribed the following portions of the note - Other sections scribed: HPI, ROS, PE.       History     Chief Complaint   Patient presents with    Abscess     reports having abscessto left buttock that was noted today. denies fever and chills     CC: Abscess    28 year old female  has a past medical history of Abnormal Pap smear, Condyloma, and Tuberculosis presents to the ED for evaluation of an abscess to the left buttocks that she noticed today. Pt denies associated fever and chills. No other associated symptoms reported.     The history is provided by the patient. No  was used.     Review of patient's allergies indicates:   Allergen Reactions    Phenergan vc [promethazine-phenylephrine] Anxiety    Percocet [oxycodone-acetaminophen] Anxiety     Past Medical History:   Diagnosis Date    Abnormal Pap smear     Condyloma     Tuberculosis      Past Surgical History:   Procedure Laterality Date    NO PAST SURGERIES       Family History   Problem Relation Age of Onset    Hypertension Paternal Grandmother     Diabetes Paternal Grandmother     Hypertension Father     Diabetes Mother     Hypertension Maternal Grandmother     Diabetes Maternal Grandmother     Hypertension Maternal Grandfather     Diabetes Maternal Grandfather     Hypertension Paternal Grandfather     Diabetes Paternal Grandfather     Ovarian cancer Neg Hx     Colon cancer Neg Hx     Breast cancer Neg Hx     Stroke Neg Hx      Social History     Tobacco Use    Smoking status: Never Smoker    Smokeless tobacco: Never Used   Substance Use Topics    Alcohol use: No     Alcohol/week: 0.0 oz    Drug use: No     Review of Systems   Skin:        (+) abscess to left buttock         Physical Exam     Initial Vitals [04/23/19 1740]   BP Pulse Resp Temp SpO2   116/63 98 18 99.4 °F  (37.4 °C) 99 %      MAP       --         Physical Exam    Nursing note and vitals reviewed.  Constitutional: She appears well-developed and well-nourished. She is not diaphoretic. No distress.   HENT:   Head: Normocephalic and atraumatic.   Nose: Nose normal.   Eyes: EOM are normal. Pupils are equal, round, and reactive to light. No scleral icterus.   Neck: Normal range of motion. Neck supple.   Cardiovascular: Normal rate, regular rhythm, normal heart sounds and intact distal pulses. Exam reveals no gallop and no friction rub.    No murmur heard.  Pulmonary/Chest: Breath sounds normal. No stridor. No respiratory distress. She has no wheezes. She has no rhonchi. She has no rales.   Abdominal: Soft. Normal appearance and bowel sounds are normal. She exhibits no distension. There is no tenderness. There is no rebound and no guarding.   Musculoskeletal: Normal range of motion. She exhibits no edema or tenderness.   Neurological: She is oriented to person, place, and time. No cranial nerve deficit.   Skin: Skin is warm and dry. Abscess (to left buttock) noted. No rash noted.   Psychiatric: She has a normal mood and affect. Her behavior is normal.         ED Course   I & D - Incision and Drainage  Date/Time: 4/23/2019 6:15 PM  Performed by: Mateo Reed MD  Authorized by: Mateo Reed MD   Pre-operative diagnosis: abscess  Post-operative diagnosis: abscess  Consent Done: Not Needed  Type: abscess  Body area: anogenital  Anesthesia: local infiltration    Anesthesia:  Local Anesthetic: lidocaine 1% without epinephrine  Patient sedated: no  Scalpel size: 11  Incision type: single straight  Complexity: simple  Drainage: pus  Wound treatment: incision and  wound left open  Complications: No  Specimens: No  Implants: No        Labs Reviewed - No data to display       Imaging Results    None                     Scribe Attestation:   Scribe #1: I performed the above scribed service and the documentation  accurately describes the services I performed. I attest to the accuracy of the note.    Attending Attestation:           Physician Attestation for Scribe:  Physician Attestation Statement for Scribe #1: I, Mateo Reed MD, reviewed documentation, as scribed by Milli Pritchard in my presence, and it is both accurate and complete.         Attending ED Notes:   I, Dr. Mateo Wayne, personally performed the services described in this documentation. All medical record entries made by the scribe were at my direction and in my presence.  I have reviewed the chart and agree that the record reflects my personal performance and is accurate and complete. Mateo Wayne MD.  6:15 PM 04/23/2019             Clinical Impression:       ICD-10-CM ICD-9-CM   1. Abscess L02.91 682.9         Disposition:   Disposition: Discharged  Condition: Stable                        Mateo Reed MD  04/23/19 1818

## 2019-05-31 NOTE — PROGRESS NOTES
Subjective:      Patient ID: Barbie Kumar is a 28 y.o. female.    Chief Complaint: Low-back Pain      HPI  (Kalyvas)    History of chronic back pain since she was a teenager. She is otherwise healthy. She is seeing cardiology for tachycardia and palpitations.     Known exaggeration of normal lumbar lordosis. Pain appeared more myofascial in nature at her last visit.     She was given a medrol dose pack and sent to PT. Here for follow up.     She had some relief with dose pack. She continues with LBP that is now intermittent (was constant). No leg pain. She has good days and bad days. She did not go to PT. She rates her pain as a 7 on a scale of 1-10. 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 She continues on prn OTC motrin. Pain is aching. No specific aggravating/alleviating factors. No numbness, tingling, or weakness.      Review of Systems   Constitution: Negative for chills, fever, night sweats and weight gain.   Gastrointestinal: Negative for bowel incontinence, nausea and vomiting.   Genitourinary: Negative for bladder incontinence.   Neurological: Negative for disturbances in coordination and loss of balance.           Objective:        General: Barbie is well-developed, well-nourished, appears stated age, in no acute distress, alert and oriented to time, place and person.     Ortho/SPM Exam     Patient sits comfortably in the exam room and answers questions appropriately. Grossly patient is able to move bilateral LEs without difficulty. Ambulates normally.         Assessment:       1. Chronic bilateral low back pain without sciatica    2. Myofascial muscle pain           Plan:       Orders Placed This Encounter    Ambulatory referral to Physical Therapy - Lumbar    methocarbamol (ROBAXIN) 500 MG Tab       History of chronic intermittent LBP x years (since she was a child). She had improvement in LBP with dose pack, it was constant and is now intermittent. No leg  pain. Known exaggeration of normal lumbar lordosis. Pain appears more myofascial in nature. Treatment options reviewed with patient and following plan made:     - Reorder PT for lumbar spine with good HEP. Internal orders done for [facility].   - Continue OTC motrin prn with food. Reviewed dosing and side effects.   - New prescription for robaxin to take prn. Reviewed dosing and side effects. May make her sleepy.   - If no improvement with above, consider lumbar MRI and possible injections. She would like to avoid injections if possible.     Follow-up: Follow up in 3 months (on 9/3/2019). If there are any questions prior to this, the patient was instructed to contact the office.

## 2019-06-03 ENCOUNTER — OFFICE VISIT (OUTPATIENT)
Dept: SPINE | Facility: CLINIC | Age: 29
End: 2019-06-03
Payer: OTHER GOVERNMENT

## 2019-06-03 VITALS
DIASTOLIC BLOOD PRESSURE: 67 MMHG | HEIGHT: 60 IN | WEIGHT: 98 LBS | SYSTOLIC BLOOD PRESSURE: 101 MMHG | BODY MASS INDEX: 19.24 KG/M2 | HEART RATE: 79 BPM

## 2019-06-03 DIAGNOSIS — M79.18 MYOFASCIAL MUSCLE PAIN: ICD-10-CM

## 2019-06-03 DIAGNOSIS — G89.29 CHRONIC BILATERAL LOW BACK PAIN WITHOUT SCIATICA: Primary | ICD-10-CM

## 2019-06-03 DIAGNOSIS — M54.50 CHRONIC BILATERAL LOW BACK PAIN WITHOUT SCIATICA: Primary | ICD-10-CM

## 2019-06-03 PROCEDURE — 99999 PR PBB SHADOW E&M-EST. PATIENT-LVL IV: ICD-10-PCS | Mod: PBBFAC,,, | Performed by: PHYSICIAN ASSISTANT

## 2019-06-03 PROCEDURE — 99213 OFFICE O/P EST LOW 20 MIN: CPT | Mod: S$PBB,,, | Performed by: PHYSICIAN ASSISTANT

## 2019-06-03 PROCEDURE — 99999 PR PBB SHADOW E&M-EST. PATIENT-LVL IV: CPT | Mod: PBBFAC,,, | Performed by: PHYSICIAN ASSISTANT

## 2019-06-03 PROCEDURE — 99213 PR OFFICE/OUTPT VISIT, EST, LEVL III, 20-29 MIN: ICD-10-PCS | Mod: S$PBB,,, | Performed by: PHYSICIAN ASSISTANT

## 2019-06-03 PROCEDURE — 99214 OFFICE O/P EST MOD 30 MIN: CPT | Mod: PBBFAC | Performed by: PHYSICIAN ASSISTANT

## 2019-06-03 RX ORDER — METHOCARBAMOL 500 MG/1
500 TABLET, FILM COATED ORAL 3 TIMES DAILY PRN
Qty: 90 TABLET | Refills: 0 | Status: SHIPPED | OUTPATIENT
Start: 2019-06-03 | End: 2019-07-03

## 2019-06-03 NOTE — PATIENT INSTRUCTIONS
It was good to see you again today!    I sent physical therapy orders to [facility] in our computer system. They should call you to set up, but if not then you can call [phone].     Continue on over the counter advil/motrin as needed. Take with food. I sent methocarbamol to your pharmacy for muscle spasms. Be careful, it can make you sleeply. Only take it as needed.     I will see you back in 3 months. Email me if you need anything prior to that visit. If you are worse, we may need to consider getting an MRI of your back and thinking about injections.     I hope you feel better.     Belinda

## 2019-06-27 ENCOUNTER — CLINICAL SUPPORT (OUTPATIENT)
Dept: REHABILITATION | Facility: HOSPITAL | Age: 29
End: 2019-06-27
Attending: PHYSICIAN ASSISTANT
Payer: OTHER GOVERNMENT

## 2019-06-27 DIAGNOSIS — G89.29 CHRONIC BILATERAL LOW BACK PAIN WITHOUT SCIATICA: ICD-10-CM

## 2019-06-27 DIAGNOSIS — M54.50 CHRONIC BILATERAL LOW BACK PAIN WITHOUT SCIATICA: ICD-10-CM

## 2019-06-27 DIAGNOSIS — M53.86 DECREASED RANGE OF MOTION OF LUMBAR SPINE: ICD-10-CM

## 2019-06-27 DIAGNOSIS — M62.81 WEAKNESS OF TRUNK MUSCULATURE: ICD-10-CM

## 2019-06-27 DIAGNOSIS — R29.3 ABNORMAL POSTURE: ICD-10-CM

## 2019-06-27 PROCEDURE — 97110 THERAPEUTIC EXERCISES: CPT | Mod: PN

## 2019-06-27 PROCEDURE — 97161 PT EVAL LOW COMPLEX 20 MIN: CPT | Mod: PN

## 2019-06-27 NOTE — PLAN OF CARE
[facility]  Physical Therapy Initial Evaluation    Name: Barbie Kumar  Clinic Number: [id number]    Therapy Diagnosis:   Encounter Diagnoses   Name Primary?    Chronic bilateral low back pain without sciatica     Weakness of trunk musculature     Decreased range of motion of lumbar spine     Abnormal posture      Physician: Belinda Everett, JANET    Physician Orders: PT Eval and Treat   Medical Diagnosis from Referral: Chronic bilateral low back pain without sciatica  Evaluation Date: 6/27/2019  Authorization Period Expiration: 9/17/2019  Plan of Care Expiration: 9/27/2019  Visit # / Visits authorized: 1/ 20    Time In: 12:00 pm  Time Out: 1:00 pm   Total Billable Time: 60  minutes    Precautions: Standard    Subjective   Date of onset: over 10 years ago  History of current condition - Barbie reports: that she has lower back pain. Pt states lower back got worse after she had her daughter. 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 DEO: pt states she had situation when she was child and ever since her lower back pain. Pt states pain is localized lower back, but sometimes she has pain radiating down towards. Pt describe lower back pain as aching, burning and stabbing. Pt states she went to E.R 2 times int he past 2 years due to lower back pain. Pt denies any bowel and bladder movement. Pt denies any fall. Aggravating factors: pushing objects, lifting up object, house shores, and work activities. Easing factors: Hot shower. Pt denies any chiropractors services.      Medical History:   Past Medical History:   Diagnosis Date    Abnormal Pap smear     Condyloma     Tuberculosis        Surgical History:   Babrie Kumar  has a past surgical history that includes No past surgeries.    Medications:   Barbie has a current medication list which includes the following prescription(s): ibuprofen, meclizine,  and methocarbamol.    Allergies:   Review of patient's allergies indicates:   Allergen Reactions    Phenergan vc [promethazine-phenylephrine] Anxiety    Percocet [oxycodone-acetaminophen] Anxiety        Imaging, bone scan films:   FINDINGS:  Vertebral bodies are normal in height without evidence of fracture.  There may be incomplete fusion of posterior elements of S1.    Slight exaggeration of the normal lumbar lordosis.  No significant spondylolisthesis.  No abnormal translation with flexion and extension.    Intervertebral disc heights are well maintained.Modest facet arthropathy suggested in the lower lumbar spine.    Prior Therapy: No  Social History:  lives with their family  Occupation: Teachers   Prior Level of Function:Independnet   Current Level of Function: pushing objects, lifting up object, house shores, and work activities.    Pain:  Current 1/10, worst 10/10, best 1/10   Location: bilateral lower back      Description: Aching, Burning and Sharp  Aggravating Factors: See above   Easing Factors: See above     Pts goals: Manage lower back pain     Objective       Observation:     Posture Alignment: slouched posture, increase lumbar lordosis.     Sensation: intact to light touch    DTR:: intact to LE    GAIT DEVIATIONS: Barbie displays No major deviation    Lumbar Range of Motion:    %   Flexion WFL    Extension 5 deg with pain   Left Side Bending WFL    Right Side Bending WFL with pain   Left rotation   WFL   Right Rotation   WFL with pain     *= pain    Lower Extremity Strength    Right LE  Left LE    Hip flexion: 4+/5 Hip flexion: 4+/5   Knee extension: 4+/5 Knee extension: 4+/5   Knee flexion: 4+/5 Knee flexion: 4+/5   Hip IR: 4+/5 Hip IR: 4+/5   Hip ER: 4+/5 Hip ER: 4+/5   Hip extension:  4+/5 Hip extension: 4+/5   Hip abduction: 4+/5 Hip abduction: 4+/5   Hip adduction: 4+/5 Hip adduction 4+/5   Ankle dorsiflexion: 4+/5 Ankle dorsiflexion: 4+/5   Ankle plantarflexion: 4+/5 Ankle plantarflexion:  4+/5     Special Tests:  -Quadrant testing: negative  -CHARISSE: negative  -Scour: negative  -Repeated Flexion: negative  -Repeated Ext:positive  -Piriformis Test: negative  -Prone Instability Test: negative  -Bridge Test: positive  -Heel walking: negative  -Toe walking: negative  -Hip extension movement pattern: negative  -Long sitting test:negative      Neuro Dynamic Testing:    Sciatic nerve:      SLR: negative      Neural Tension:     Slump test: negativ    Palpation:    Flexibility:    Ely's test: WFL   Popliteal Angle: WFL      PT Evaluation Completed? Yes  Discussed Plan of Care with patient: Yes        CMS Impairment/Limitation/Restriction for FOTO Lumbar Spine Survey  Status Limitation G-Code CMS Severity Modifier  Intake 60% 40% Current Status CK - At least 40 percent but less than 60 percent  Predicted 71% 29% Goal Status+ CJ - At least 20 percent but less than 40 percent  D/C Status CK **only report if this is a one time visit    TREATMENT   Treatment Time In: 12:40 pm  Treatment Time Out:12:50 pm  Total Treatment time separate from Evaluation: 10 minutes    Barbie received therapeutic exercises to develop strength, endurance, ROM, flexibility, posture and core stabilization for 25 minutes including:  Bird dog   Cat camel   Single leg bridge  Modified plank   Side plank   Child pose    Barbie received cold pack for 10 minutes to lower back.      Home Exercises and Patient Education Provided    Education provided:   - Perform HEP 2 x per day     Written Home Exercises Provided: Patient instructed to cont prior HEP.  Exercises were reviewed and Barbie was able to demonstrate them prior to the end of the session.  Barbie demonstrated good  understanding of the education provided.     See EMR under Patient Instructions for exercises provided 6/27/2019.    Assessment   Barbie is a 28 y.o. female referred to outpatient Physical Therapy with a medical diagnosis of  Chronic bilateral low back pain without  sciatica. Pt presents with chronic lower back pain without sciatica affecting her house shores activities, work activities, lifting and pushing objects during her life functional mobility. Pt has lower back pain over 10 years, but pain got worse since she had baby. Pt was positive for bridge test indicating poor core stability causing lower back pain. Pt demonstrated decrease of lumbar range of motion. Pt demonstrated increase lower back pain during lumbar extension due to excessive lordosis of lumbar region. Seated and standing posture demonstrated increase lumbar lordosis and slouched posture. Pt will benefit from skilled PT services to decrease functional mobility at work and home.     Pt prognosis is Good.   Pt will benefit from skilled outpatient Physical Therapy to address the deficits stated above and in the chart below, provide pt/family education, and to maximize pt's level of independence.     Plan of care discussed with patient: Yes  Pt's spiritual, cultural and educational needs considered and patient is agreeable to the plan of care and goals as stated below:     Anticipated Barriers for therapy: Pain    Medical Necessity is demonstrated by the following  History  Co-morbidities and personal factors that may impact the plan of care Co-morbidities:   Not identified     Personal Factors:   no deficits     low   Examination  Body Structures and Functions, activity limitations and participation restrictions that may impact the plan of care Body Regions:   back    Body Systems:    ROM  strength    Participation Restrictions:   Work    Activity limitations:   Learning and applying knowledge  no deficits    General Tasks and Commands  no deficits    Communication  no deficits    Mobility  lifting and carrying objects  walking    Self care  no deficits    Domestic Life  shopping  cooking  doing house work (cleaning house, washing dishes, laundry)    Interactions/Relationships  no deficits    Life Areas  no  deficits    Community and Social Life  community life  recreation and leisure         Complexity: low  See FOTO outcome assessment   Clinical Presentation stable and uncomplicated low   Decision Making/ Complexity Score: low     GOALS: Short Term Goals:  4 weeks  1. Report decreased in pain at worse less than  <   / =  5  /10  to increase tolerance for functional activities  2. Pt to tolerate HEP to improve ROM and independence with ADL's  3. Pt to improve range of motion by 25% to allow for improved functional mobility to allow for improvement in IADLs.     Long Term Goals: 8 weeks  1. Report decreased in pain at worse less than  <   / =  2  /10  to increase tolerance for functional mobility.   2. Pt will report at 29% or less limitation on FOTO lumbar spine survey  to demonstrate decrease in disability and improvement in back pain.  3. Pt to be Independent with HEP to improve ROM and independence with ADL's  4. Pt to demonstrate negative Bridge Test in order to show improved core strength for lumbar stabilization.   5. Pt to improve lumbar  range of motion by 55% to allow for improved functional mobility to allow for improvement in IADLs.       Plan   Plan of care Certification: 6/27/2019 to 9/27/2019.    Outpatient Physical Therapy 2 times weekly for 12 weeks to include the following interventions: Manual Therapy, Moist Heat/ Ice, Neuromuscular Re-ed, Patient Education, Therapeutic Activites and Therapeutic Exercise.     Raheem Nicole, PT

## 2019-08-27 ENCOUNTER — TELEPHONE (OUTPATIENT)
Dept: OBSTETRICS AND GYNECOLOGY | Facility: CLINIC | Age: 29
End: 2019-08-27

## 2019-08-27 NOTE — TELEPHONE ENCOUNTER
----- Message from Nayely Ramsay sent at 8/27/2019 10:53 AM CDT -----  Contact: ZURI CASTANON [[id number]]  Name of Who is Calling : ZURI CASTANON [[id number]]    What is the request in detail :     Patient is requesting a call from staff  She is still having irregular bleeding and cramping and would like to speak with the staff  .....Please contact to further discuss and advise.    Can the clinic reply by [facility] : no    What Number to Call Back : [phone]

## 2019-08-29 ENCOUNTER — PATIENT MESSAGE (OUTPATIENT)
Dept: OBSTETRICS AND GYNECOLOGY | Facility: CLINIC | Age: 29
End: 2019-08-29

## 2019-08-29 ENCOUNTER — OFFICE VISIT (OUTPATIENT)
Dept: OBSTETRICS AND GYNECOLOGY | Facility: CLINIC | Age: 29
End: 2019-08-29
Attending: OBSTETRICS & GYNECOLOGY
Payer: OTHER GOVERNMENT

## 2019-08-29 ENCOUNTER — LAB VISIT (OUTPATIENT)
Dept: LAB | Facility: OTHER | Age: 29
End: 2019-08-29
Attending: OBSTETRICS & GYNECOLOGY
Payer: OTHER GOVERNMENT

## 2019-08-29 ENCOUNTER — TELEPHONE (OUTPATIENT)
Dept: OBSTETRICS AND GYNECOLOGY | Facility: CLINIC | Age: 29
End: 2019-08-29

## 2019-08-29 VITALS
WEIGHT: 102.06 LBS | DIASTOLIC BLOOD PRESSURE: 70 MMHG | BODY MASS INDEX: 20.04 KG/M2 | SYSTOLIC BLOOD PRESSURE: 90 MMHG | HEIGHT: 60 IN

## 2019-08-29 DIAGNOSIS — N93.9 ABNORMAL UTERINE BLEEDING (AUB): Primary | ICD-10-CM

## 2019-08-29 DIAGNOSIS — N93.9 ABNORMAL UTERINE BLEEDING (AUB): ICD-10-CM

## 2019-08-29 LAB
B-HCG UR QL: NEGATIVE
CTP QC/QA: YES
HCG INTACT+B SERPL-ACNC: <1.2 MIU/ML

## 2019-08-29 PROCEDURE — 87801 DETECT AGNT MULT DNA AMPLI: CPT

## 2019-08-29 PROCEDURE — 99999 PR PBB SHADOW E&M-EST. PATIENT-LVL III: CPT | Mod: PBBFAC,,, | Performed by: OBSTETRICS & GYNECOLOGY

## 2019-08-29 PROCEDURE — 99213 OFFICE O/P EST LOW 20 MIN: CPT | Mod: S$PBB,,, | Performed by: OBSTETRICS & GYNECOLOGY

## 2019-08-29 PROCEDURE — 87661 TRICHOMONAS VAGINALIS AMPLIF: CPT

## 2019-08-29 PROCEDURE — 87491 CHLMYD TRACH DNA AMP PROBE: CPT | Mod: 59

## 2019-08-29 PROCEDURE — 99213 OFFICE O/P EST LOW 20 MIN: CPT | Mod: PBBFAC | Performed by: OBSTETRICS & GYNECOLOGY

## 2019-08-29 PROCEDURE — 84702 CHORIONIC GONADOTROPIN TEST: CPT

## 2019-08-29 PROCEDURE — 99999 PR PBB SHADOW E&M-EST. PATIENT-LVL III: ICD-10-PCS | Mod: PBBFAC,,, | Performed by: OBSTETRICS & GYNECOLOGY

## 2019-08-29 PROCEDURE — 87481 CANDIDA DNA AMP PROBE: CPT | Mod: 59

## 2019-08-29 PROCEDURE — 36415 COLL VENOUS BLD VENIPUNCTURE: CPT

## 2019-08-29 PROCEDURE — 81025 URINE PREGNANCY TEST: CPT | Mod: PBBFAC | Performed by: OBSTETRICS & GYNECOLOGY

## 2019-08-29 PROCEDURE — 99213 PR OFFICE/OUTPT VISIT, EST, LEVL III, 20-29 MIN: ICD-10-PCS | Mod: S$PBB,,, | Performed by: OBSTETRICS & GYNECOLOGY

## 2019-08-29 RX ORDER — MECLIZINE HCL 12.5 MG 12.5 MG/1
12.5 TABLET ORAL
COMMUNITY
End: 2021-09-22

## 2019-08-29 NOTE — TELEPHONE ENCOUNTER
Called patient:    Aware of negative BHCG.    To monitor menses and let us know if she has any additional abnormal bleeding.

## 2019-08-29 NOTE — PROGRESS NOTES
Chief Complaint   Patient presents with    Menometrorrhagia       HPI:  Barbie Kumar is a 28 y.o. female patient  who presents today for evaluation of an episode of abnormal bleeding.  Menses are generally monthly, lasting 4 days in duration, without intermenstrual bleeding.  Her period 19 was normal.  She expected a period to occur 8/15/19 but did not have any bleeding until 19 which is still occurring.  Today, her bleeding is light.  She describes performing home UPT 19 that was positive, the 19 that was negative.  No pelvic pain.  No fever.  She is currently not trying to become or prevent pregnancy.  Patient's last menstrual period was 2019 (approximate).     UPT: Negative    Pap 10/23/18: Negative    Past Medical History:   Diagnosis Date    Abnormal Pap smear     Condyloma     Tuberculosis        Past Surgical History:   Procedure Laterality Date    NO PAST SURGERIES           ROS:  GENERAL: Reports increased appetite.   SKIN: Denies rash or lesions.   HEAD: Denies head injury or headache.   NODES: Denies enlarged lymph nodes.   CHEST: Denies chest pain or shortness of breath.   CARDIOVASCULAR: Denies palpitations or left sided chest pain.   ABDOMEN: No abdominal pain, nausea, vomiting or rectal bleeding.   URINARY: No dysuria or hematuria.  REPRODUCTIVE: See HPI.   BREASTS: Denies pain, lumps, or nipple discharge.   HEMATOLOGIC: No easy bruisability or excessive bleeding.   MUSCULOSKELETAL: Denies joint pain or swelling.   NEUROLOGIC: Denies syncope or weakness.   PSYCHIATRIC: Denies depression.    PE:   (chaperone present during entire exam)  APPEARANCE: Well nourished, well developed, in no acute distress.  ABDOMEN: Soft. No tenderness or masses.   VULVA: No lesions. Normal female genitalia.  URETHRAL MEATUS: Normal size and location, no lesions, no prolapse.  URETHRA: No masses, tenderness, prolapse or scarring.  VAGINA: Moist and well rugated, mild amount of  menstrual blood in vault.  CERVIX: No lesions and discharge. No CMT.  UTERUS: Normal size, regular shape, mobile, non-tender, bladder base nontender.  ADNEXA: No masses, tenderness or CDS nodularity.  ANUS PERINEUM: Normal.    Diagnosis:  1. Abnormal uterine bleeding (AUB)          PLAN:    Orders Placed This Encounter    C. trachomatis/N. gonorrhoeae by AMP DNA [facility]; Cervix    VAGINOSIS SCREEN BY DNA PROBE    hCG, quantitative    POCT Urine Pregnancy       Patient was counseled today on her recent abnormal period and the various etiologies.  UPT today was negative.  She will have BHCG performed to exclude a pregnancy event causing bleeding.  GC/CT and Affirm were performed to rule out an infectious etiology.  For now, she will monitor her menses and let us know if any additional abnormal bleeding occurs.    Follow-up for annual exam    Total time of visit: 20 minutes (counseling >75% of time)

## 2019-08-30 ENCOUNTER — PATIENT MESSAGE (OUTPATIENT)
Dept: OBSTETRICS AND GYNECOLOGY | Facility: CLINIC | Age: 29
End: 2019-08-30

## 2019-08-30 LAB
BACTERIAL VAGINOSIS DNA: POSITIVE
C TRACH DNA SPEC QL NAA+PROBE: NOT DETECTED
CANDIDA GLABRATA DNA: NEGATIVE
CANDIDA KRUSEI DNA: NEGATIVE
CANDIDA RRNA VAG QL PROBE: NEGATIVE
N GONORRHOEA DNA SPEC QL NAA+PROBE: NOT DETECTED
T VAGINALIS RRNA GENITAL QL PROBE: NEGATIVE

## 2019-09-01 ENCOUNTER — PATIENT MESSAGE (OUTPATIENT)
Dept: OBSTETRICS AND GYNECOLOGY | Facility: CLINIC | Age: 29
End: 2019-09-01

## 2019-09-01 RX ORDER — METRONIDAZOLE 500 MG/1
500 TABLET ORAL 2 TIMES DAILY
Qty: 14 TABLET | Refills: 0 | Status: SHIPPED | OUTPATIENT
Start: 2019-09-01 | End: 2019-09-08

## 2019-09-03 ENCOUNTER — PATIENT MESSAGE (OUTPATIENT)
Dept: OBSTETRICS AND GYNECOLOGY | Facility: CLINIC | Age: 29
End: 2019-09-03

## 2019-09-05 ENCOUNTER — TELEPHONE (OUTPATIENT)
Dept: OBSTETRICS AND GYNECOLOGY | Facility: CLINIC | Age: 29
End: 2019-09-05

## 2019-09-05 NOTE — TELEPHONE ENCOUNTER
----- Message from Joya Nagy sent at 9/5/2019  4:36 PM CDT -----  Contact: ZURI CASTANON [[id number]]  Can the clinic reply in [facility]: no    Please refill the medication(s) listed below. The patient can be reached at this phone number once it is called into the pharmacy.    Medication #1 metroNIDAZOLE (FLAGYL) 500 MG tablet    Medication #2    Preferred [facility]acy: [facility]

## 2019-10-10 ENCOUNTER — NURSE TRIAGE (OUTPATIENT)
Dept: ADMINISTRATIVE | Facility: CLINIC | Age: 29
End: 2019-10-10

## 2019-10-11 ENCOUNTER — TELEPHONE (OUTPATIENT)
Dept: OBSTETRICS AND GYNECOLOGY | Facility: CLINIC | Age: 29
End: 2019-10-11

## 2019-10-11 RX ORDER — FLUCONAZOLE 150 MG/1
TABLET ORAL
Qty: 2 TABLET | Refills: 0 | Status: SHIPPED | OUTPATIENT
Start: 2019-10-11 | End: 2021-04-13

## 2019-10-11 NOTE — TELEPHONE ENCOUNTER
Charo Rivera, RN   Registered Nurse      Telephone Encounter   Signed   Creation Time:  10/10/2019  9:31 PM                        Reason for Disposition   Caller has NON-URGENT medication question about med that PCP prescribed and triager unable to answer question   [1] Caller requesting NON-URGENT health information AND [2] PCP's office is the best resource    Protocols used: MEDICATION QUESTION CALL-A-AH, INFORMATION ONLY CALL-A-AH     Pt was recently on antibiotics and thinks she has a yeast infection. Pt stated she used Monistat cream and started having a burning sensation right after. Informed that burning is a common side effect of the Monistat. Pt would like Provider to give medication for yeast infection. Message sent to Provider's office.

## 2019-10-11 NOTE — TELEPHONE ENCOUNTER
Patient will take one oral Diflucan and then in two days per Dr Sanchez start Monistat in two days

## 2019-10-11 NOTE — TELEPHONE ENCOUNTER
Reason for Disposition   Caller has NON-URGENT medication question about med that PCP prescribed and triager unable to answer question   [1] Caller requesting NON-URGENT health information AND [2] PCP's office is the best resource    Protocols used: MEDICATION QUESTION CALL-A-AH, INFORMATION ONLY CALL-A-AH    Pt was recently on antibiotics and thinks she has a yeast infection. Pt stated she used Monistat cream and started having a burning sensation right after. Informed that burning is a common side effect of the Monistat. Pt would like Provider to give medication for yeast infection. Message sent to Provider's office.

## 2020-01-20 ENCOUNTER — PATIENT MESSAGE (OUTPATIENT)
Dept: OBSTETRICS AND GYNECOLOGY | Facility: CLINIC | Age: 30
End: 2020-01-20

## 2020-01-20 ENCOUNTER — NURSE TRIAGE (OUTPATIENT)
Dept: ADMINISTRATIVE | Facility: CLINIC | Age: 30
End: 2020-01-20

## 2020-01-20 NOTE — TELEPHONE ENCOUNTER
Pt states her period is lasting longer than usual and she is passing clots the size of a dime, denies any stomach pain or dizziness, admits to normal stomach cramps, advised her to see her provider within 2 weeks and to call back with any worsening symptoms, needs, or concerns, caller agreed    Reason for Disposition   Periods with > 6 soaked pads or tampons per day    Additional Information   Negative: SEVERE vaginal bleeding (e.g., continuous red blood from vagina, or large blood clots) and very weak (can't stand)   Negative: Passed out (i.e., fainted, collapsed and was not responding)   Negative: Difficult to awaken or acting confused (e.g., disoriented, slurred speech)   Negative: Shock suspected (e.g., cold/pale/clammy skin, too weak to stand, low BP, rapid pulse)   Negative: Sounds like a life-threatening emergency to the triager   Negative: Pregnant > 20 weeks (5 months or more)   Negative: Pregnant < 20 weeks (less than 5 months)   Negative: Postpartum < 1 month since delivery ('Did you recently give birth?')   Negative: Vaginal discharge is the main symptom and bleeding is slight   Negative: SEVERE abdominal pain (e.g., excruciating)   Negative: SEVERE dizziness (e.g., unable to stand, requires support to walk, feels like passing out now)   Negative: SEVERE vaginal bleeding (i.e., soaking 2 pads or tampons per hour and present 2 or more hours; 1 menstrual cup every 2 hours)   Negative: MODERATE vaginal bleeding (i.e., soaking pad or tampon per hour and present > 6 hours; 1 menstrual cup every 6 hours)   Negative: Pale skin (pallor) of new onset or worsening   Negative: Constant abdominal pain lasting > 2 hours   Negative: Patient sounds very sick or weak to the triager   Negative: Taking Coumadin (warfarin) or other strong blood thinner, or known bleeding disorder (e.g., thrombocytopenia)   Negative: Skin bruises or nosebleed and not caused by an injury   Negative: Bleeding/spotting  after procedure (e.g., biopsy) or pelvic examination (e.g., pap smear) that persists > 3 days   Negative: Patient wants to be seen   Negative: Passed tissue (e.g., gray-white)    Protocols used: VAGINAL BLEEDING - [id number]

## 2020-02-16 ENCOUNTER — HOSPITAL ENCOUNTER (EMERGENCY)
Facility: HOSPITAL | Age: 30
Discharge: HOME | End: 2020-02-16
Attending: EMERGENCY MEDICINE
Payer: OTHER GOVERNMENT

## 2020-02-16 VITALS
OXYGEN SATURATION: 97 % | HEIGHT: 60 IN | TEMPERATURE: 100 F | RESPIRATION RATE: 18 BRPM | HEART RATE: 106 BPM | DIASTOLIC BLOOD PRESSURE: 87 MMHG | WEIGHT: 100 LBS | SYSTOLIC BLOOD PRESSURE: 126 MMHG | BODY MASS INDEX: 19.63 KG/M2

## 2020-02-16 DIAGNOSIS — S39.012A BACK STRAIN, INITIAL ENCOUNTER: Primary | ICD-10-CM

## 2020-02-16 LAB
B-HCG UR QL: NEGATIVE
CTP QC/QA: YES

## 2020-02-16 PROCEDURE — 96372 THER/PROPH/DIAG INJ SC/IM: CPT

## 2020-02-16 PROCEDURE — 63600175 PHARM REV CODE 636 W HCPCS: Performed by: PHYSICIAN ASSISTANT

## 2020-02-16 PROCEDURE — 99284 EMERGENCY DEPT VISIT MOD MDM: CPT | Mod: 25

## 2020-02-16 PROCEDURE — 81025 URINE PREGNANCY TEST: CPT | Performed by: PHYSICIAN ASSISTANT

## 2020-02-16 PROCEDURE — 25000003 PHARM REV CODE 250: Performed by: PHYSICIAN ASSISTANT

## 2020-02-16 RX ORDER — ORPHENADRINE CITRATE 100 MG/1
100 TABLET, EXTENDED RELEASE ORAL ONCE
Status: COMPLETED | OUTPATIENT
Start: 2020-02-16 | End: 2020-02-16

## 2020-02-16 RX ORDER — MELOXICAM 7.5 MG/1
7.5 TABLET ORAL DAILY
Qty: 12 TABLET | Refills: 0 | Status: SHIPPED | OUTPATIENT
Start: 2020-02-16 | End: 2021-04-13

## 2020-02-16 RX ORDER — LIDOCAINE 50 MG/G
1 PATCH TOPICAL DAILY
Qty: 6 PATCH | Refills: 0 | Status: SHIPPED | OUTPATIENT
Start: 2020-02-16 | End: 2021-04-13

## 2020-02-16 RX ORDER — ORPHENADRINE CITRATE 100 MG/1
100 TABLET, EXTENDED RELEASE ORAL 2 TIMES DAILY
Qty: 8 TABLET | Refills: 0 | Status: SHIPPED | OUTPATIENT
Start: 2020-02-16 | End: 2020-02-20

## 2020-02-16 RX ORDER — ORPHENADRINE CITRATE 30 MG/ML
30 INJECTION INTRAMUSCULAR; INTRAVENOUS
Status: DISCONTINUED | OUTPATIENT
Start: 2020-02-16 | End: 2020-02-16

## 2020-02-16 RX ORDER — ACETAMINOPHEN 500 MG
1000 TABLET ORAL
Status: COMPLETED | OUTPATIENT
Start: 2020-02-16 | End: 2020-02-16

## 2020-02-16 RX ORDER — KETOROLAC TROMETHAMINE 30 MG/ML
30 INJECTION, SOLUTION INTRAMUSCULAR; INTRAVENOUS
Status: COMPLETED | OUTPATIENT
Start: 2020-02-16 | End: 2020-02-16

## 2020-02-16 RX ADMIN — ORPHENADRINE CITRATE 100 MG: 100 TABLET, EXTENDED RELEASE ORAL at 12:02

## 2020-02-16 RX ADMIN — ACETAMINOPHEN 1000 MG: 500 TABLET ORAL at 12:02

## 2020-02-16 RX ADMIN — KETOROLAC TROMETHAMINE 30 MG: 30 INJECTION, SOLUTION INTRAMUSCULAR at 12:02

## 2020-02-16 NOTE — ED TRIAGE NOTES
Patient reports back pain that started on yesterday. Hx of back problems. Denies any known injury/trauma. Reports taking Advil, last taken on yesterday. Denies n/v/d, dysuria, hematuria.

## 2020-02-16 NOTE — ED PROVIDER NOTES
Encounter Date: 2/16/2020    SCRIBE #1 NOTE: I, Shefali Nagy, am scribing for, and in the presence of,  Narinder Bernal PA-C. I have scribed the following portions of the note - Other sections scribed: HPI, ROS.       History     Chief Complaint   Patient presents with    Back Pain     c/o lower back pain x 1 day. pt denies trauma or fall. pt denies urinary sx. LBM: 2/14/20     29 year old female patient presents to the ED complaining of sharp low back pain that she reports is worse with movement onset last night. She denies any recent trauma or injuries. She reports that she was lying down yesterday after cleaning when she felt the pain begin. She reports a similar episode of back pain 1 year ago. She states that she reported to this ED and was told that her pain was due to inflammation. She denies any shortness of breath, chest pain, abdominal pain, nausea, emesis, problems with urination or defecation, or leg pain.    The history is provided by the patient.     Review of patient's allergies indicates:   Allergen Reactions    Phenergan vc [promethazine-phenylephrine] Anxiety    Percocet [oxycodone-acetaminophen] Anxiety     Past Medical History:   Diagnosis Date    Abnormal Pap smear     Arrhythmia     Condyloma     Tuberculosis      Past Surgical History:   Procedure Laterality Date    NO PAST SURGERIES       Family History   Problem Relation Age of Onset    Hypertension Paternal Grandmother     Diabetes Paternal Grandmother     Hypertension Father     Diabetes Mother     Hypertension Maternal Grandmother     Diabetes Maternal Grandmother     Hypertension Maternal Grandfather     Diabetes Maternal Grandfather     Hypertension Paternal Grandfather     Diabetes Paternal Grandfather     Ovarian cancer Neg Hx     Colon cancer Neg Hx     Breast cancer Neg Hx     Stroke Neg Hx      Social History     Tobacco Use    Smoking status: Never Smoker    Smokeless tobacco: Never Used   Substance Use  Topics    Alcohol use: No     Alcohol/week: 0.0 standard drinks    Drug use: No     Review of Systems   Constitutional: Negative for fever.   HENT: Negative for sore throat.    Eyes: Negative for visual disturbance.   Respiratory: Negative for shortness of breath.    Cardiovascular: Negative for chest pain.   Gastrointestinal: Negative for abdominal pain and vomiting.   Genitourinary: Negative for dysuria.   Musculoskeletal: Positive for back pain (Low).   Skin: Negative for rash.   Neurological: Negative for dizziness.   All other systems reviewed and are negative.      Physical Exam     Initial Vitals [02/16/20 1137]   BP Pulse Resp Temp SpO2   (!) 140/69 90 19 98.4 °F (36.9 °C) 98 %      MAP       --         Physical Exam    Nursing note and vitals reviewed.  Constitutional: She appears well-developed and well-nourished. She is not diaphoretic. No distress.   HENT:   Head: Normocephalic and atraumatic.   Nose: Nose normal.   Eyes: Conjunctivae and EOM are normal. Right eye exhibits no discharge. Left eye exhibits no discharge.   Neck: Normal range of motion. No tracheal deviation present. No JVD present.   Cardiovascular: Normal rate, regular rhythm and normal heart sounds. Exam reveals no friction rub.    No murmur heard.  Pulmonary/Chest: Breath sounds normal. No stridor. No respiratory distress. She has no wheezes. She has no rhonchi. She has no rales. She exhibits no tenderness.   Abdominal: Soft. She exhibits no distension. There is no tenderness. There is no guarding.   Musculoskeletal: Normal range of motion.   Very positional reproducible tenderness over the entirety of her back.  No bony or midline tenderness, however.  No skin changes.  Ambulatory.   Neurological: She is alert and oriented to person, place, and time.   Skin: Skin is warm and dry. No rash and no abscess noted. No erythema. No pallor.         ED Course   Procedures  Labs Reviewed   POCT URINE PREGNANCY          Imaging Results    None           Medical Decision Making:   History:   Old Medical Records: I decided to obtain old medical records.  Clinical Tests:   Lab Tests: Ordered and Reviewed  ED Management:  Presentation consistent with musculoskeletal pain.  No history of trauma. Carefully considered but doubt cauda equina, epidural abscess, ureteral stone, pyelonephritis, and shingles.  Low suspicion for occult cardiac and pulmonary etiology.  Sent with supportive care and advised follow-up with PCP.  Strict return precautions discussed with patient is agreeable to the plan.            Scribe Attestation:   Scribe #1: I performed the above scribed service and the documentation accurately describes the services I performed. I attest to the accuracy of the note.                          Clinical Impression:       ICD-10-CM ICD-9-CM   1. Back strain, initial encounter S39.012A 847.9            I, Narinder Bernal PA-C, personally performed the services described in this documentation. All medical record entries made by the scribe were at my direction and in my presence. I have reviewed the chart and agree that the record reflects my personal performance and is accurate and complete.                 Narinder Bernal PA-C  02/16/20 [id number]

## 2021-03-30 ENCOUNTER — HOSPITAL ENCOUNTER (EMERGENCY)
Facility: HOSPITAL | Age: 31
Discharge: HOME | End: 2021-03-30
Attending: EMERGENCY MEDICINE
Payer: MEDICAID

## 2021-03-30 VITALS
DIASTOLIC BLOOD PRESSURE: 68 MMHG | RESPIRATION RATE: 18 BRPM | SYSTOLIC BLOOD PRESSURE: 110 MMHG | OXYGEN SATURATION: 100 % | WEIGHT: 97 LBS | HEIGHT: 60 IN | TEMPERATURE: 98 F | HEART RATE: 92 BPM | BODY MASS INDEX: 19.04 KG/M2

## 2021-03-30 DIAGNOSIS — S46.812A STRAIN OF LEFT TRAPEZIUS MUSCLE, INITIAL ENCOUNTER: Primary | ICD-10-CM

## 2021-03-30 LAB
B-HCG UR QL: NEGATIVE
CTP QC/QA: YES

## 2021-03-30 PROCEDURE — 99283 EMERGENCY DEPT VISIT LOW MDM: CPT | Mod: 25

## 2021-03-30 PROCEDURE — 81025 URINE PREGNANCY TEST: CPT | Performed by: PHYSICIAN ASSISTANT

## 2021-03-30 RX ORDER — ORPHENADRINE CITRATE 100 MG/1
100 TABLET, EXTENDED RELEASE ORAL 2 TIMES DAILY
Qty: 8 TABLET | Refills: 0 | Status: SHIPPED | OUTPATIENT
Start: 2021-03-30 | End: 2021-04-03

## 2021-03-30 RX ORDER — MELOXICAM 7.5 MG/1
7.5 TABLET ORAL DAILY
Qty: 12 TABLET | Refills: 0 | Status: SHIPPED | OUTPATIENT
Start: 2021-03-30 | End: 2021-04-13

## 2021-04-13 ENCOUNTER — OFFICE VISIT (OUTPATIENT)
Dept: OBSTETRICS AND GYNECOLOGY | Facility: CLINIC | Age: 31
End: 2021-04-13
Attending: OBSTETRICS & GYNECOLOGY
Payer: MEDICAID

## 2021-04-13 VITALS — SYSTOLIC BLOOD PRESSURE: 104 MMHG | WEIGHT: 100.5 LBS | DIASTOLIC BLOOD PRESSURE: 60 MMHG | BODY MASS INDEX: 19.63 KG/M2

## 2021-04-13 DIAGNOSIS — Z12.4 PAP SMEAR FOR CERVICAL CANCER SCREENING: ICD-10-CM

## 2021-04-13 DIAGNOSIS — Z01.419 WOMEN'S ANNUAL ROUTINE GYNECOLOGICAL EXAMINATION: Primary | ICD-10-CM

## 2021-04-13 PROCEDURE — 87624 HPV HI-RISK TYP POOLED RSLT: CPT | Performed by: OBSTETRICS & GYNECOLOGY

## 2021-04-13 PROCEDURE — 99395 PR PREVENTIVE VISIT,EST,18-39: ICD-10-PCS | Mod: S$PBB,,, | Performed by: OBSTETRICS & GYNECOLOGY

## 2021-04-13 PROCEDURE — 99212 OFFICE O/P EST SF 10 MIN: CPT | Mod: PBBFAC | Performed by: OBSTETRICS & GYNECOLOGY

## 2021-04-13 PROCEDURE — 99999 PR PBB SHADOW E&M-EST. PATIENT-LVL II: ICD-10-PCS | Mod: PBBFAC,,, | Performed by: OBSTETRICS & GYNECOLOGY

## 2021-04-13 PROCEDURE — 99999 PR PBB SHADOW E&M-EST. PATIENT-LVL II: CPT | Mod: PBBFAC,,, | Performed by: OBSTETRICS & GYNECOLOGY

## 2021-04-13 PROCEDURE — 99395 PREV VISIT EST AGE 18-39: CPT | Mod: S$PBB,,, | Performed by: OBSTETRICS & GYNECOLOGY

## 2021-04-13 PROCEDURE — 88175 CYTOPATH C/V AUTO FLUID REDO: CPT | Performed by: OBSTETRICS & GYNECOLOGY

## 2021-04-20 ENCOUNTER — PATIENT MESSAGE (OUTPATIENT)
Dept: OBSTETRICS AND GYNECOLOGY | Facility: CLINIC | Age: 31
End: 2021-04-20

## 2021-04-20 LAB
FINAL PATHOLOGIC DIAGNOSIS: NORMAL
Lab: NORMAL

## 2021-04-21 LAB
HPV HR 12 DNA SPEC QL NAA+PROBE: NEGATIVE
HPV16 AG SPEC QL: NEGATIVE
HPV18 DNA SPEC QL NAA+PROBE: NEGATIVE

## 2021-04-26 ENCOUNTER — HOSPITAL ENCOUNTER (EMERGENCY)
Facility: HOSPITAL | Age: 31
Discharge: HOME | End: 2021-04-26
Attending: EMERGENCY MEDICINE
Payer: MEDICAID

## 2021-04-26 VITALS
RESPIRATION RATE: 17 BRPM | BODY MASS INDEX: 19.04 KG/M2 | OXYGEN SATURATION: 100 % | WEIGHT: 97 LBS | HEART RATE: 89 BPM | TEMPERATURE: 99 F | SYSTOLIC BLOOD PRESSURE: 106 MMHG | HEIGHT: 60 IN | DIASTOLIC BLOOD PRESSURE: 68 MMHG

## 2021-04-26 DIAGNOSIS — M54.50 ACUTE LEFT-SIDED LOW BACK PAIN WITHOUT SCIATICA: ICD-10-CM

## 2021-04-26 DIAGNOSIS — R07.9 CHEST PAIN: ICD-10-CM

## 2021-04-26 DIAGNOSIS — R10.9 LEFT FLANK PAIN: Primary | ICD-10-CM

## 2021-04-26 LAB
ALBUMIN SERPL BCP-MCNC: 4.1 G/DL (ref 3.5–5.2)
ALP SERPL-CCNC: 59 U/L (ref 55–135)
ALT SERPL W/O P-5'-P-CCNC: 13 U/L (ref 10–44)
ANION GAP SERPL CALC-SCNC: 5 MMOL/L (ref 8–16)
AST SERPL-CCNC: 20 U/L (ref 10–40)
B-HCG UR QL: NEGATIVE
B-HCG UR QL: NEGATIVE
BASOPHILS # BLD AUTO: 0.02 K/UL (ref 0–0.2)
BASOPHILS NFR BLD: 0.3 % (ref 0–1.9)
BILIRUB SERPL-MCNC: 0.6 MG/DL (ref 0.1–1)
BILIRUB UR QL STRIP: NEGATIVE
BUN SERPL-MCNC: 12 MG/DL (ref 6–20)
CALCIUM SERPL-MCNC: 9.4 MG/DL (ref 8.7–10.5)
CHLORIDE SERPL-SCNC: 109 MMOL/L (ref 95–110)
CLARITY UR: CLEAR
CO2 SERPL-SCNC: 23 MMOL/L (ref 23–29)
COLOR UR: YELLOW
CREAT SERPL-MCNC: 0.8 MG/DL (ref 0.5–1.4)
CTP QC/QA: YES
CTP QC/QA: YES
D DIMER PPP IA.FEU-MCNC: 0.63 MG/L FEU
DIFFERENTIAL METHOD: ABNORMAL
EOSINOPHIL # BLD AUTO: 0.6 K/UL (ref 0–0.5)
EOSINOPHIL NFR BLD: 9 % (ref 0–8)
ERYTHROCYTE [DISTWIDTH] IN BLOOD BY AUTOMATED COUNT: 14.3 % (ref 11.5–14.5)
EST. GFR  (AFRICAN AMERICAN): >60 ML/MIN/1.73 M^2
EST. GFR  (NON AFRICAN AMERICAN): >60 ML/MIN/1.73 M^2
GLUCOSE SERPL-MCNC: 92 MG/DL (ref 70–110)
GLUCOSE UR QL STRIP: NEGATIVE
HCT VFR BLD AUTO: 35.7 % (ref 37–48.5)
HGB BLD-MCNC: 12.3 G/DL (ref 12–16)
HGB UR QL STRIP: NEGATIVE
IMM GRANULOCYTES # BLD AUTO: 0.01 K/UL (ref 0–0.04)
IMM GRANULOCYTES NFR BLD AUTO: 0.2 % (ref 0–0.5)
KETONES UR QL STRIP: NEGATIVE
LEUKOCYTE ESTERASE UR QL STRIP: ABNORMAL
LYMPHOCYTES # BLD AUTO: 1.8 K/UL (ref 1–4.8)
LYMPHOCYTES NFR BLD: 28.1 % (ref 18–48)
MCH RBC QN AUTO: 28.5 PG (ref 27–31)
MCHC RBC AUTO-ENTMCNC: 34.5 G/DL (ref 32–36)
MCV RBC AUTO: 83 FL (ref 82–98)
MICROSCOPIC COMMENT: NORMAL
MONOCYTES # BLD AUTO: 0.5 K/UL (ref 0.3–1)
MONOCYTES NFR BLD: 8.1 % (ref 4–15)
NEUTROPHILS # BLD AUTO: 3.5 K/UL (ref 1.8–7.7)
NEUTROPHILS NFR BLD: 54.3 % (ref 38–73)
NITRITE UR QL STRIP: NEGATIVE
NRBC BLD-RTO: 0 /100 WBC
PH UR STRIP: 7 [PH] (ref 5–8)
PLATELET # BLD AUTO: 229 K/UL (ref 150–450)
PMV BLD AUTO: 9.9 FL (ref 9.2–12.9)
POTASSIUM SERPL-SCNC: 4.6 MMOL/L (ref 3.5–5.1)
PROT SERPL-MCNC: 8 G/DL (ref 6–8.4)
PROT UR QL STRIP: NEGATIVE
RBC # BLD AUTO: 4.31 M/UL (ref 4–5.4)
RBC #/AREA URNS HPF: 1 /HPF (ref 0–4)
SODIUM SERPL-SCNC: 137 MMOL/L (ref 136–145)
SP GR UR STRIP: 1.02 (ref 1–1.03)
SQUAMOUS #/AREA URNS HPF: 1 /HPF
TROPONIN I SERPL DL<=0.01 NG/ML-MCNC: <0.006 NG/ML (ref 0–0.03)
TROPONIN I SERPL DL<=0.01 NG/ML-MCNC: <0.006 NG/ML (ref 0–0.03)
URN SPEC COLLECT METH UR: ABNORMAL
UROBILINOGEN UR STRIP-ACNC: NEGATIVE EU/DL
WBC # BLD AUTO: 6.44 K/UL (ref 3.9–12.7)
WBC #/AREA URNS HPF: 3 /HPF (ref 0–5)

## 2021-04-26 PROCEDURE — 93010 EKG 12-LEAD: ICD-10-PCS | Mod: ,,, | Performed by: INTERNAL MEDICINE

## 2021-04-26 PROCEDURE — 99284 EMERGENCY DEPT VISIT MOD MDM: CPT | Mod: 25

## 2021-04-26 PROCEDURE — 25500020 PHARM REV CODE 255: Performed by: EMERGENCY MEDICINE

## 2021-04-26 PROCEDURE — 93010 ELECTROCARDIOGRAM REPORT: CPT | Mod: ,,, | Performed by: INTERNAL MEDICINE

## 2021-04-26 PROCEDURE — 81025 URINE PREGNANCY TEST: CPT | Performed by: EMERGENCY MEDICINE

## 2021-04-26 PROCEDURE — 81000 URINALYSIS NONAUTO W/SCOPE: CPT | Performed by: EMERGENCY MEDICINE

## 2021-04-26 PROCEDURE — 93005 ELECTROCARDIOGRAM TRACING: CPT

## 2021-04-26 PROCEDURE — 63600175 PHARM REV CODE 636 W HCPCS: Performed by: EMERGENCY MEDICINE

## 2021-04-26 PROCEDURE — 84484 ASSAY OF TROPONIN QUANT: CPT | Performed by: EMERGENCY MEDICINE

## 2021-04-26 PROCEDURE — 85025 COMPLETE CBC W/AUTO DIFF WBC: CPT | Performed by: EMERGENCY MEDICINE

## 2021-04-26 PROCEDURE — 25000003 PHARM REV CODE 250: Performed by: EMERGENCY MEDICINE

## 2021-04-26 PROCEDURE — 80053 COMPREHEN METABOLIC PANEL: CPT | Performed by: EMERGENCY MEDICINE

## 2021-04-26 PROCEDURE — 96361 HYDRATE IV INFUSION ADD-ON: CPT

## 2021-04-26 PROCEDURE — 85379 FIBRIN DEGRADATION QUANT: CPT | Performed by: EMERGENCY MEDICINE

## 2021-04-26 PROCEDURE — 96374 THER/PROPH/DIAG INJ IV PUSH: CPT | Mod: 59

## 2021-04-26 RX ORDER — ORPHENADRINE CITRATE 100 MG/1
100 TABLET, EXTENDED RELEASE ORAL 2 TIMES DAILY PRN
Qty: 20 TABLET | Refills: 0 | Status: SHIPPED | OUTPATIENT
Start: 2021-04-26 | End: 2021-05-06

## 2021-04-26 RX ORDER — KETOROLAC TROMETHAMINE 30 MG/ML
15 INJECTION, SOLUTION INTRAMUSCULAR; INTRAVENOUS
Status: COMPLETED | OUTPATIENT
Start: 2021-04-26 | End: 2021-04-26

## 2021-04-26 RX ADMIN — SODIUM CHLORIDE 1000 ML: 0.9 INJECTION, SOLUTION INTRAVENOUS at 12:04

## 2021-04-26 RX ADMIN — KETOROLAC TROMETHAMINE 15 MG: 30 INJECTION, SOLUTION INTRAMUSCULAR; INTRAVENOUS at 12:04

## 2021-04-26 RX ADMIN — IOHEXOL 75 ML: 350 INJECTION, SOLUTION INTRAVENOUS at 06:04

## 2021-04-28 ENCOUNTER — PATIENT MESSAGE (OUTPATIENT)
Dept: RESEARCH | Facility: HOSPITAL | Age: 31
End: 2021-04-28

## 2021-05-06 ENCOUNTER — PATIENT MESSAGE (OUTPATIENT)
Dept: ORTHOPEDICS | Facility: CLINIC | Age: 31
End: 2021-05-06

## 2021-06-28 ENCOUNTER — TELEPHONE (OUTPATIENT)
Dept: OBSTETRICS AND GYNECOLOGY | Facility: CLINIC | Age: 31
End: 2021-06-28

## 2021-08-01 ENCOUNTER — PATIENT MESSAGE (OUTPATIENT)
Dept: OBSTETRICS AND GYNECOLOGY | Facility: CLINIC | Age: 31
End: 2021-08-01

## 2021-08-02 ENCOUNTER — TELEPHONE (OUTPATIENT)
Dept: OBSTETRICS AND GYNECOLOGY | Facility: CLINIC | Age: 31
End: 2021-08-02

## 2021-08-02 DIAGNOSIS — N91.4 SECONDARY OLIGOMENORRHEA: Primary | ICD-10-CM

## 2021-08-03 ENCOUNTER — LAB VISIT (OUTPATIENT)
Dept: LAB | Facility: HOSPITAL | Age: 31
End: 2021-08-03
Attending: OBSTETRICS & GYNECOLOGY
Payer: MEDICAID

## 2021-08-03 ENCOUNTER — TELEPHONE (OUTPATIENT)
Dept: OBSTETRICS AND GYNECOLOGY | Facility: CLINIC | Age: 31
End: 2021-08-03

## 2021-08-03 DIAGNOSIS — Z36.9 ANTENATAL SCREENING ENCOUNTER: Primary | ICD-10-CM

## 2021-08-03 DIAGNOSIS — N91.4 SECONDARY OLIGOMENORRHEA: ICD-10-CM

## 2021-08-03 DIAGNOSIS — Z32.01 POSITIVE PREGNANCY TEST: Primary | ICD-10-CM

## 2021-08-03 LAB
ABO + RH BLD: NORMAL
BLD GP AB SCN CELLS X3 SERPL QL: NORMAL
HCG INTACT+B SERPL-ACNC: 2681 MIU/ML
PROGEST SERPL-MCNC: 25 NG/ML

## 2021-08-03 PROCEDURE — 84702 CHORIONIC GONADOTROPIN TEST: CPT | Performed by: OBSTETRICS & GYNECOLOGY

## 2021-08-03 PROCEDURE — 36415 COLL VENOUS BLD VENIPUNCTURE: CPT | Performed by: OBSTETRICS & GYNECOLOGY

## 2021-08-03 PROCEDURE — 84144 ASSAY OF PROGESTERONE: CPT | Performed by: OBSTETRICS & GYNECOLOGY

## 2021-08-03 PROCEDURE — 86900 BLOOD TYPING SEROLOGIC ABO: CPT | Performed by: OBSTETRICS & GYNECOLOGY

## 2021-08-09 ENCOUNTER — TELEPHONE (OUTPATIENT)
Dept: OBSTETRICS AND GYNECOLOGY | Facility: CLINIC | Age: 31
End: 2021-08-09

## 2021-08-09 DIAGNOSIS — Z20.822 CLOSE EXPOSURE TO COVID-19 VIRUS: Primary | ICD-10-CM

## 2021-08-10 ENCOUNTER — LAB VISIT (OUTPATIENT)
Dept: PRIMARY CARE CLINIC | Facility: CLINIC | Age: 31
End: 2021-08-10
Payer: MEDICAID

## 2021-08-10 DIAGNOSIS — Z20.822 ENCOUNTER FOR LABORATORY TESTING FOR COVID-19 VIRUS: ICD-10-CM

## 2021-08-10 PROCEDURE — U0005 INFEC AGEN DETEC AMPLI PROBE: HCPCS | Performed by: INTERNAL MEDICINE

## 2021-08-10 PROCEDURE — U0003 INFECTIOUS AGENT DETECTION BY NUCLEIC ACID (DNA OR RNA); SEVERE ACUTE RESPIRATORY SYNDROME CORONAVIRUS 2 (SARS-COV-2) (CORONAVIRUS DISEASE [COVID-19]), AMPLIFIED PROBE TECHNIQUE, MAKING USE OF HIGH THROUGHPUT TECHNOLOGIES AS DESCRIBED BY CMS-2020-01-R: HCPCS | Performed by: INTERNAL MEDICINE

## 2021-08-12 LAB
SARS-COV-2 RNA RESP QL NAA+PROBE: NOT DETECTED
SARS-COV-2- CYCLE NUMBER: -1

## 2021-08-13 ENCOUNTER — TELEPHONE (OUTPATIENT)
Dept: OBSTETRICS AND GYNECOLOGY | Facility: CLINIC | Age: 31
End: 2021-08-13

## 2021-08-20 ENCOUNTER — TELEPHONE (OUTPATIENT)
Dept: OBSTETRICS AND GYNECOLOGY | Facility: CLINIC | Age: 31
End: 2021-08-20

## 2021-08-20 RX ORDER — ONDANSETRON 4 MG/1
4 TABLET, FILM COATED ORAL EVERY 8 HOURS PRN
Qty: 20 TABLET | Refills: 1 | Status: SHIPPED | OUTPATIENT
Start: 2021-08-20 | End: 2021-12-17

## 2021-08-24 ENCOUNTER — PROCEDURE VISIT (OUTPATIENT)
Dept: OBSTETRICS AND GYNECOLOGY | Facility: CLINIC | Age: 31
End: 2021-08-24
Attending: OBSTETRICS & GYNECOLOGY
Payer: MEDICAID

## 2021-08-24 ENCOUNTER — PATIENT MESSAGE (OUTPATIENT)
Dept: OBSTETRICS AND GYNECOLOGY | Facility: CLINIC | Age: 31
End: 2021-08-24

## 2021-08-24 ENCOUNTER — LAB VISIT (OUTPATIENT)
Dept: LAB | Facility: OTHER | Age: 31
End: 2021-08-24
Attending: OBSTETRICS & GYNECOLOGY
Payer: MEDICAID

## 2021-08-24 VITALS
WEIGHT: 105.63 LBS | SYSTOLIC BLOOD PRESSURE: 104 MMHG | HEIGHT: 60 IN | BODY MASS INDEX: 20.74 KG/M2 | DIASTOLIC BLOOD PRESSURE: 74 MMHG

## 2021-08-24 DIAGNOSIS — N91.4 SECONDARY OLIGOMENORRHEA: Primary | ICD-10-CM

## 2021-08-24 DIAGNOSIS — Z32.01 PREGNANCY TEST POSITIVE: ICD-10-CM

## 2021-08-24 DIAGNOSIS — N91.4 SECONDARY OLIGOMENORRHEA: ICD-10-CM

## 2021-08-24 DIAGNOSIS — O09.299 HISTORY OF PRIOR PREGNANCY WITH SHORT CERVIX, CURRENTLY PREGNANT: ICD-10-CM

## 2021-08-24 DIAGNOSIS — Z36.89 ESTABLISH GESTATIONAL AGE, ULTRASOUND: ICD-10-CM

## 2021-08-24 DIAGNOSIS — Z32.01 POSITIVE PREGNANCY TEST: ICD-10-CM

## 2021-08-24 DIAGNOSIS — O09.299 HISTORY OF MATERNAL THIRD DEGREE PERINEAL LACERATION, CURRENTLY PREGNANT: ICD-10-CM

## 2021-08-24 LAB
ABO + RH BLD: NORMAL
ANION GAP SERPL CALC-SCNC: 9 MMOL/L (ref 8–16)
BASOPHILS # BLD AUTO: 0.03 K/UL (ref 0–0.2)
BASOPHILS NFR BLD: 0.2 % (ref 0–1.9)
BLD GP AB SCN CELLS X3 SERPL QL: NORMAL
BUN SERPL-MCNC: 9 MG/DL (ref 6–20)
CALCIUM SERPL-MCNC: 9.7 MG/DL (ref 8.7–10.5)
CHLORIDE SERPL-SCNC: 102 MMOL/L (ref 95–110)
CO2 SERPL-SCNC: 24 MMOL/L (ref 23–29)
CREAT SERPL-MCNC: 0.7 MG/DL (ref 0.5–1.4)
DIFFERENTIAL METHOD: ABNORMAL
EOSINOPHIL # BLD AUTO: 0.6 K/UL (ref 0–0.5)
EOSINOPHIL NFR BLD: 4.6 % (ref 0–8)
ERYTHROCYTE [DISTWIDTH] IN BLOOD BY AUTOMATED COUNT: 14.1 % (ref 11.5–14.5)
EST. GFR  (AFRICAN AMERICAN): >60 ML/MIN/1.73 M^2
EST. GFR  (NON AFRICAN AMERICAN): >60 ML/MIN/1.73 M^2
GLUCOSE SERPL-MCNC: 81 MG/DL (ref 70–110)
HCT VFR BLD AUTO: 37.8 % (ref 37–48.5)
HGB BLD-MCNC: 12.7 G/DL (ref 12–16)
IMM GRANULOCYTES # BLD AUTO: 0.04 K/UL (ref 0–0.04)
IMM GRANULOCYTES NFR BLD AUTO: 0.3 % (ref 0–0.5)
LYMPHOCYTES # BLD AUTO: 2.4 K/UL (ref 1–4.8)
LYMPHOCYTES NFR BLD: 19.8 % (ref 18–48)
MCH RBC QN AUTO: 28.9 PG (ref 27–31)
MCHC RBC AUTO-ENTMCNC: 33.6 G/DL (ref 32–36)
MCV RBC AUTO: 86 FL (ref 82–98)
MONOCYTES # BLD AUTO: 0.9 K/UL (ref 0.3–1)
MONOCYTES NFR BLD: 7.4 % (ref 4–15)
NEUTROPHILS # BLD AUTO: 8.1 K/UL (ref 1.8–7.7)
NEUTROPHILS NFR BLD: 67.7 % (ref 38–73)
NRBC BLD-RTO: 0 /100 WBC
PLATELET # BLD AUTO: 264 K/UL (ref 150–450)
PMV BLD AUTO: 9.8 FL (ref 9.2–12.9)
POTASSIUM SERPL-SCNC: 4 MMOL/L (ref 3.5–5.1)
RBC # BLD AUTO: 4.4 M/UL (ref 4–5.4)
SODIUM SERPL-SCNC: 135 MMOL/L (ref 136–145)
WBC # BLD AUTO: 12.05 K/UL (ref 3.9–12.7)

## 2021-08-24 PROCEDURE — 87086 URINE CULTURE/COLONY COUNT: CPT | Performed by: OBSTETRICS & GYNECOLOGY

## 2021-08-24 PROCEDURE — 80048 BASIC METABOLIC PNL TOTAL CA: CPT | Performed by: OBSTETRICS & GYNECOLOGY

## 2021-08-24 PROCEDURE — 85025 COMPLETE CBC W/AUTO DIFF WBC: CPT | Performed by: OBSTETRICS & GYNECOLOGY

## 2021-08-24 PROCEDURE — 86762 RUBELLA ANTIBODY: CPT | Performed by: OBSTETRICS & GYNECOLOGY

## 2021-08-24 PROCEDURE — 99213 OFFICE O/P EST LOW 20 MIN: CPT | Mod: PBBFAC,TH,25 | Performed by: OBSTETRICS & GYNECOLOGY

## 2021-08-24 PROCEDURE — 87591 N.GONORRHOEAE DNA AMP PROB: CPT | Performed by: OBSTETRICS & GYNECOLOGY

## 2021-08-24 PROCEDURE — 76801 OB US < 14 WKS SINGLE FETUS: CPT | Mod: 26,S$PBB,, | Performed by: OBSTETRICS & GYNECOLOGY

## 2021-08-24 PROCEDURE — 87340 HEPATITIS B SURFACE AG IA: CPT | Performed by: OBSTETRICS & GYNECOLOGY

## 2021-08-24 PROCEDURE — 99214 OFFICE O/P EST MOD 30 MIN: CPT | Mod: S$PBB,TH,, | Performed by: OBSTETRICS & GYNECOLOGY

## 2021-08-24 PROCEDURE — 87491 CHLMYD TRACH DNA AMP PROBE: CPT | Performed by: OBSTETRICS & GYNECOLOGY

## 2021-08-24 PROCEDURE — 99999 PR PBB SHADOW E&M-EST. PATIENT-LVL III: CPT | Mod: PBBFAC,,, | Performed by: OBSTETRICS & GYNECOLOGY

## 2021-08-24 PROCEDURE — 86900 BLOOD TYPING SEROLOGIC ABO: CPT | Performed by: OBSTETRICS & GYNECOLOGY

## 2021-08-24 PROCEDURE — 99999 PR PBB SHADOW E&M-EST. PATIENT-LVL III: ICD-10-PCS | Mod: PBBFAC,,, | Performed by: OBSTETRICS & GYNECOLOGY

## 2021-08-24 PROCEDURE — 86592 SYPHILIS TEST NON-TREP QUAL: CPT | Performed by: OBSTETRICS & GYNECOLOGY

## 2021-08-24 PROCEDURE — 87389 HIV-1 AG W/HIV-1&-2 AB AG IA: CPT | Performed by: OBSTETRICS & GYNECOLOGY

## 2021-08-24 PROCEDURE — 76801 OB US < 14 WKS SINGLE FETUS: CPT | Mod: PBBFAC | Performed by: OBSTETRICS & GYNECOLOGY

## 2021-08-24 PROCEDURE — 76801 PR US, OB <14WKS, TRANSABD, SINGLE GESTATION: ICD-10-PCS | Mod: 26,S$PBB,, | Performed by: OBSTETRICS & GYNECOLOGY

## 2021-08-24 PROCEDURE — 99214 PR OFFICE/OUTPT VISIT, EST, LEVL IV, 30-39 MIN: ICD-10-PCS | Mod: S$PBB,TH,, | Performed by: OBSTETRICS & GYNECOLOGY

## 2021-08-25 LAB
BACTERIA UR CULT: NO GROWTH
C TRACH DNA SPEC QL NAA+PROBE: NOT DETECTED
HBV SURFACE AG SERPL QL IA: NEGATIVE
HIV 1+2 AB+HIV1 P24 AG SERPL QL IA: NEGATIVE
N GONORRHOEA DNA SPEC QL NAA+PROBE: NOT DETECTED
RPR SER QL: NORMAL
RUBV IGG SER-ACNC: 39.1 IU/ML
RUBV IGG SER-IMP: REACTIVE

## 2021-08-26 ENCOUNTER — TELEPHONE (OUTPATIENT)
Dept: OBSTETRICS AND GYNECOLOGY | Facility: CLINIC | Age: 31
End: 2021-08-26

## 2021-08-31 ENCOUNTER — PATIENT MESSAGE (OUTPATIENT)
Dept: OBSTETRICS AND GYNECOLOGY | Facility: CLINIC | Age: 31
End: 2021-08-31

## 2021-09-02 ENCOUNTER — OFFICE VISIT (OUTPATIENT)
Dept: MATERNAL FETAL MEDICINE | Facility: CLINIC | Age: 31
End: 2021-09-02
Payer: MEDICAID

## 2021-09-02 DIAGNOSIS — O09.299 HISTORY OF PRIOR PREGNANCY WITH SHORT CERVIX, CURRENTLY PREGNANT: ICD-10-CM

## 2021-09-02 PROCEDURE — 99499 UNLISTED E&M SERVICE: CPT | Mod: 95,,, | Performed by: OBSTETRICS & GYNECOLOGY

## 2021-09-02 PROCEDURE — 99499 NO LOS: ICD-10-PCS | Mod: 95,,, | Performed by: OBSTETRICS & GYNECOLOGY

## 2021-09-07 ENCOUNTER — PATIENT MESSAGE (OUTPATIENT)
Dept: OBSTETRICS AND GYNECOLOGY | Facility: CLINIC | Age: 31
End: 2021-09-07

## 2021-09-07 ENCOUNTER — TELEPHONE (OUTPATIENT)
Dept: OBSTETRICS AND GYNECOLOGY | Facility: CLINIC | Age: 31
End: 2021-09-07

## 2021-09-21 ENCOUNTER — PATIENT MESSAGE (OUTPATIENT)
Dept: OBSTETRICS AND GYNECOLOGY | Facility: CLINIC | Age: 31
End: 2021-09-21

## 2021-09-21 ENCOUNTER — TELEPHONE (OUTPATIENT)
Dept: OBSTETRICS AND GYNECOLOGY | Facility: CLINIC | Age: 31
End: 2021-09-21

## 2021-09-22 ENCOUNTER — HOSPITAL ENCOUNTER (EMERGENCY)
Facility: HOSPITAL | Age: 31
Discharge: HOME | End: 2021-09-22
Attending: EMERGENCY MEDICINE
Payer: MEDICAID

## 2021-09-22 ENCOUNTER — OFFICE VISIT (OUTPATIENT)
Dept: OBSTETRICS AND GYNECOLOGY | Facility: CLINIC | Age: 31
End: 2021-09-22
Attending: OBSTETRICS & GYNECOLOGY
Payer: MEDICAID

## 2021-09-22 VITALS
BODY MASS INDEX: 21.79 KG/M2 | TEMPERATURE: 98 F | SYSTOLIC BLOOD PRESSURE: 118 MMHG | OXYGEN SATURATION: 100 % | HEIGHT: 60 IN | RESPIRATION RATE: 16 BRPM | HEART RATE: 94 BPM | WEIGHT: 111 LBS | DIASTOLIC BLOOD PRESSURE: 78 MMHG

## 2021-09-22 DIAGNOSIS — O20.9 BLEEDING IN EARLY PREGNANCY: ICD-10-CM

## 2021-09-22 DIAGNOSIS — O26.899 PELVIC PAIN IN PREGNANCY: ICD-10-CM

## 2021-09-22 DIAGNOSIS — N83.202 CYST OF LEFT OVARY: ICD-10-CM

## 2021-09-22 DIAGNOSIS — N30.90 CYSTITIS: Primary | ICD-10-CM

## 2021-09-22 DIAGNOSIS — O46.90 VAGINAL BLEEDING IN PREGNANCY: ICD-10-CM

## 2021-09-22 DIAGNOSIS — R10.2 PELVIC PAIN IN PREGNANCY: ICD-10-CM

## 2021-09-22 DIAGNOSIS — O26.899 ABDOMINAL CRAMPING AFFECTING PREGNANCY: Primary | ICD-10-CM

## 2021-09-22 DIAGNOSIS — R10.9 ABDOMINAL CRAMPING AFFECTING PREGNANCY: Primary | ICD-10-CM

## 2021-09-22 LAB
ABO + RH BLD: NORMAL
ALBUMIN SERPL BCP-MCNC: 3.3 G/DL (ref 3.5–5.2)
ALP SERPL-CCNC: 42 U/L (ref 55–135)
ALT SERPL W/O P-5'-P-CCNC: 8 U/L (ref 10–44)
ANION GAP SERPL CALC-SCNC: 9 MMOL/L (ref 8–16)
AST SERPL-CCNC: 10 U/L (ref 10–40)
B-HCG UR QL: POSITIVE
BACTERIA #/AREA URNS HPF: ABNORMAL /HPF
BACTERIA GENITAL QL WET PREP: ABNORMAL
BASOPHILS # BLD AUTO: 0.05 K/UL (ref 0–0.2)
BASOPHILS NFR BLD: 0.6 % (ref 0–1.9)
BILIRUB SERPL-MCNC: 0.2 MG/DL (ref 0.1–1)
BILIRUB UR QL STRIP: NEGATIVE
BUN SERPL-MCNC: 11 MG/DL (ref 6–20)
CALCIUM SERPL-MCNC: 9.3 MG/DL (ref 8.7–10.5)
CHLORIDE SERPL-SCNC: 105 MMOL/L (ref 95–110)
CLARITY UR: ABNORMAL
CLUE CELLS VAG QL WET PREP: ABNORMAL
CO2 SERPL-SCNC: 20 MMOL/L (ref 23–29)
COLOR UR: YELLOW
CREAT SERPL-MCNC: 0.7 MG/DL (ref 0.5–1.4)
CTP QC/QA: YES
DIFFERENTIAL METHOD: ABNORMAL
EOSINOPHIL # BLD AUTO: 0.6 K/UL (ref 0–0.5)
EOSINOPHIL NFR BLD: 7.1 % (ref 0–8)
ERYTHROCYTE [DISTWIDTH] IN BLOOD BY AUTOMATED COUNT: 14.6 % (ref 11.5–14.5)
EST. GFR  (AFRICAN AMERICAN): >60 ML/MIN/1.73 M^2
EST. GFR  (NON AFRICAN AMERICAN): >60 ML/MIN/1.73 M^2
FILAMENT FUNGI VAG WET PREP-#/AREA: ABNORMAL
GLUCOSE SERPL-MCNC: 95 MG/DL (ref 70–110)
GLUCOSE UR QL STRIP: NEGATIVE
HCG INTACT+B SERPL-ACNC: NORMAL MIU/ML
HCT VFR BLD AUTO: 36.2 % (ref 37–48.5)
HGB BLD-MCNC: 12.3 G/DL (ref 12–16)
HGB UR QL STRIP: ABNORMAL
IMM GRANULOCYTES # BLD AUTO: 0.03 K/UL (ref 0–0.04)
IMM GRANULOCYTES NFR BLD AUTO: 0.3 % (ref 0–0.5)
KETONES UR QL STRIP: NEGATIVE
LEUKOCYTE ESTERASE UR QL STRIP: ABNORMAL
LYMPHOCYTES # BLD AUTO: 1.9 K/UL (ref 1–4.8)
LYMPHOCYTES NFR BLD: 21.5 % (ref 18–48)
MCH RBC QN AUTO: 29 PG (ref 27–31)
MCHC RBC AUTO-ENTMCNC: 34 G/DL (ref 32–36)
MCV RBC AUTO: 85 FL (ref 82–98)
MICROSCOPIC COMMENT: ABNORMAL
MONOCYTES # BLD AUTO: 0.7 K/UL (ref 0.3–1)
MONOCYTES NFR BLD: 7.9 % (ref 4–15)
NEUTROPHILS # BLD AUTO: 5.5 K/UL (ref 1.8–7.7)
NEUTROPHILS NFR BLD: 62.6 % (ref 38–73)
NITRITE UR QL STRIP: NEGATIVE
NRBC BLD-RTO: 0 /100 WBC
PH UR STRIP: 6 [PH] (ref 5–8)
PLATELET # BLD AUTO: 234 K/UL (ref 150–450)
PMV BLD AUTO: 9.8 FL (ref 9.2–12.9)
POTASSIUM SERPL-SCNC: 3.7 MMOL/L (ref 3.5–5.1)
PROT SERPL-MCNC: 7.1 G/DL (ref 6–8.4)
PROT UR QL STRIP: ABNORMAL
RBC # BLD AUTO: 4.24 M/UL (ref 4–5.4)
RBC #/AREA URNS HPF: 5 /HPF (ref 0–4)
SODIUM SERPL-SCNC: 134 MMOL/L (ref 136–145)
SP GR UR STRIP: 1.03 (ref 1–1.03)
SPECIMEN SOURCE: ABNORMAL
SQUAMOUS #/AREA URNS HPF: 6 /HPF
T VAGINALIS GENITAL QL WET PREP: ABNORMAL
URN SPEC COLLECT METH UR: ABNORMAL
UROBILINOGEN UR STRIP-ACNC: NEGATIVE EU/DL
WBC # BLD AUTO: 8.84 K/UL (ref 3.9–12.7)
WBC #/AREA URNS HPF: 35 /HPF (ref 0–5)
WBC #/AREA VAG WET PREP: ABNORMAL
WBC CLUMPS URNS QL MICRO: ABNORMAL
YEAST GENITAL QL WET PREP: ABNORMAL

## 2021-09-22 PROCEDURE — 99284 EMERGENCY DEPT VISIT MOD MDM: CPT | Mod: 25

## 2021-09-22 PROCEDURE — 80053 COMPREHEN METABOLIC PANEL: CPT | Performed by: EMERGENCY MEDICINE

## 2021-09-22 PROCEDURE — 81000 URINALYSIS NONAUTO W/SCOPE: CPT | Performed by: PHYSICIAN ASSISTANT

## 2021-09-22 PROCEDURE — 87086 URINE CULTURE/COLONY COUNT: CPT | Performed by: PHYSICIAN ASSISTANT

## 2021-09-22 PROCEDURE — 99213 PR OFFICE/OUTPT VISIT, EST, LEVL III, 20-29 MIN: ICD-10-PCS | Mod: TH,95,, | Performed by: OBSTETRICS & GYNECOLOGY

## 2021-09-22 PROCEDURE — 96372 THER/PROPH/DIAG INJ SC/IM: CPT

## 2021-09-22 PROCEDURE — 85025 COMPLETE CBC W/AUTO DIFF WBC: CPT | Performed by: EMERGENCY MEDICINE

## 2021-09-22 PROCEDURE — 99213 OFFICE O/P EST LOW 20 MIN: CPT | Mod: TH,95,, | Performed by: OBSTETRICS & GYNECOLOGY

## 2021-09-22 PROCEDURE — 87491 CHLMYD TRACH DNA AMP PROBE: CPT | Performed by: PHYSICIAN ASSISTANT

## 2021-09-22 PROCEDURE — 63700000 PHARM REV CODE 250 ALT 637 W/O HCPCS: Performed by: PHYSICIAN ASSISTANT

## 2021-09-22 PROCEDURE — 86900 BLOOD TYPING SEROLOGIC ABO: CPT | Performed by: PHYSICIAN ASSISTANT

## 2021-09-22 PROCEDURE — 63600175 PHARM REV CODE 636 W HCPCS: Performed by: PHYSICIAN ASSISTANT

## 2021-09-22 PROCEDURE — 81025 URINE PREGNANCY TEST: CPT | Performed by: PHYSICIAN ASSISTANT

## 2021-09-22 PROCEDURE — 87210 SMEAR WET MOUNT SALINE/INK: CPT | Performed by: PHYSICIAN ASSISTANT

## 2021-09-22 PROCEDURE — 25000003 PHARM REV CODE 250: Performed by: PHYSICIAN ASSISTANT

## 2021-09-22 PROCEDURE — 84702 CHORIONIC GONADOTROPIN TEST: CPT | Performed by: PHYSICIAN ASSISTANT

## 2021-09-22 PROCEDURE — 87591 N.GONORRHOEAE DNA AMP PROB: CPT | Performed by: PHYSICIAN ASSISTANT

## 2021-09-22 RX ORDER — LIDOCAINE HYDROCHLORIDE 10 MG/ML
5 INJECTION INFILTRATION; PERINEURAL
Status: COMPLETED | OUTPATIENT
Start: 2021-09-22 | End: 2021-09-22

## 2021-09-22 RX ORDER — CEFTRIAXONE 500 MG/1
0.5 INJECTION, POWDER, FOR SOLUTION INTRAMUSCULAR; INTRAVENOUS
Status: DISCONTINUED | OUTPATIENT
Start: 2021-09-22 | End: 2021-09-22

## 2021-09-22 RX ORDER — AZITHROMYCIN 250 MG/1
1000 TABLET, FILM COATED ORAL
Status: COMPLETED | OUTPATIENT
Start: 2021-09-22 | End: 2021-09-22

## 2021-09-22 RX ORDER — CEFTRIAXONE 1 G/1
1 INJECTION, POWDER, FOR SOLUTION INTRAMUSCULAR; INTRAVENOUS
Status: COMPLETED | OUTPATIENT
Start: 2021-09-22 | End: 2021-09-22

## 2021-09-22 RX ORDER — AMOXICILLIN AND CLAVULANATE POTASSIUM 875; 125 MG/1; MG/1
1 TABLET, FILM COATED ORAL 2 TIMES DAILY
Qty: 14 TABLET | Refills: 0 | Status: SHIPPED | OUTPATIENT
Start: 2021-09-22 | End: 2021-09-29

## 2021-09-22 RX ADMIN — CEFTRIAXONE 1 G: 1 INJECTION, POWDER, FOR SOLUTION INTRAMUSCULAR; INTRAVENOUS at 05:09

## 2021-09-22 RX ADMIN — LIDOCAINE HYDROCHLORIDE 5 ML: 10 INJECTION, SOLUTION INFILTRATION; PERINEURAL at 05:09

## 2021-09-22 RX ADMIN — AZITHROMYCIN MONOHYDRATE 1000 MG: 250 TABLET ORAL at 05:09

## 2021-09-23 LAB
C TRACH DNA SPEC QL NAA+PROBE: NOT DETECTED
N GONORRHOEA DNA SPEC QL NAA+PROBE: NOT DETECTED

## 2021-09-24 LAB — BACTERIA UR CULT: NO GROWTH

## 2021-09-27 ENCOUNTER — TELEPHONE (OUTPATIENT)
Dept: OBSTETRICS AND GYNECOLOGY | Facility: CLINIC | Age: 31
End: 2021-09-27

## 2021-09-27 ENCOUNTER — OFFICE VISIT (OUTPATIENT)
Dept: MATERNAL FETAL MEDICINE | Facility: CLINIC | Age: 31
End: 2021-09-27
Payer: MEDICAID

## 2021-09-27 DIAGNOSIS — Z86.79 HISTORY OF CARDIAC ARRHYTHMIA: ICD-10-CM

## 2021-09-27 DIAGNOSIS — R10.2 PELVIC PAIN AFFECTING PREGNANCY IN SECOND TRIMESTER, ANTEPARTUM: ICD-10-CM

## 2021-09-27 DIAGNOSIS — O09.299 HISTORY OF PRIOR PREGNANCY WITH SHORT CERVIX, CURRENTLY PREGNANT: ICD-10-CM

## 2021-09-27 DIAGNOSIS — O26.892 PELVIC PAIN AFFECTING PREGNANCY IN SECOND TRIMESTER, ANTEPARTUM: ICD-10-CM

## 2021-09-27 DIAGNOSIS — Z36.89 ENCOUNTER FOR ULTRASOUND TO CHECK FETAL GROWTH: Primary | ICD-10-CM

## 2021-09-27 PROCEDURE — 99215 OFFICE O/P EST HI 40 MIN: CPT | Mod: TH,95,, | Performed by: OBSTETRICS & GYNECOLOGY

## 2021-09-27 PROCEDURE — 99215 PR OFFICE/OUTPT VISIT, EST, LEVL V, 40-54 MIN: ICD-10-PCS | Mod: TH,95,, | Performed by: OBSTETRICS & GYNECOLOGY

## 2021-09-28 ENCOUNTER — ROUTINE PRENATAL (OUTPATIENT)
Dept: OBSTETRICS AND GYNECOLOGY | Facility: CLINIC | Age: 31
End: 2021-09-28
Attending: OBSTETRICS & GYNECOLOGY
Payer: MEDICAID

## 2021-09-28 ENCOUNTER — PATIENT MESSAGE (OUTPATIENT)
Dept: ADMINISTRATIVE | Facility: OTHER | Age: 31
End: 2021-09-28

## 2021-09-28 ENCOUNTER — APPOINTMENT (OUTPATIENT)
Dept: LAB | Facility: OTHER | Age: 31
End: 2021-09-28
Attending: OBSTETRICS & GYNECOLOGY
Payer: MEDICAID

## 2021-09-28 VITALS — BODY MASS INDEX: 21.48 KG/M2 | WEIGHT: 110 LBS | DIASTOLIC BLOOD PRESSURE: 60 MMHG | SYSTOLIC BLOOD PRESSURE: 120 MMHG

## 2021-09-28 DIAGNOSIS — M25.559 PREGNANCY RELATED HIP PAIN IN SECOND TRIMESTER, ANTEPARTUM: ICD-10-CM

## 2021-09-28 DIAGNOSIS — Z34.80 SUPERVISION OF OTHER NORMAL PREGNANCY, ANTEPARTUM: Primary | ICD-10-CM

## 2021-09-28 DIAGNOSIS — O09.299 HISTORY OF PRIOR PREGNANCY WITH SHORT CERVIX, CURRENTLY PREGNANT: ICD-10-CM

## 2021-09-28 DIAGNOSIS — Z86.79 HISTORY OF CARDIAC ARRHYTHMIA: ICD-10-CM

## 2021-09-28 DIAGNOSIS — O26.892 PREGNANCY RELATED HIP PAIN IN SECOND TRIMESTER, ANTEPARTUM: ICD-10-CM

## 2021-09-28 PROCEDURE — 99999 PR PBB SHADOW E&M-EST. PATIENT-LVL III: CPT | Mod: PBBFAC,,, | Performed by: OBSTETRICS & GYNECOLOGY

## 2021-09-28 PROCEDURE — 99213 PR OFFICE/OUTPT VISIT, EST, LEVL III, 20-29 MIN: ICD-10-PCS | Mod: TH,S$PBB,, | Performed by: OBSTETRICS & GYNECOLOGY

## 2021-09-28 PROCEDURE — 99999 PR PBB SHADOW E&M-EST. PATIENT-LVL III: ICD-10-PCS | Mod: PBBFAC,,, | Performed by: OBSTETRICS & GYNECOLOGY

## 2021-09-28 PROCEDURE — 99213 OFFICE O/P EST LOW 20 MIN: CPT | Mod: PBBFAC,TH | Performed by: OBSTETRICS & GYNECOLOGY

## 2021-09-28 PROCEDURE — 99213 OFFICE O/P EST LOW 20 MIN: CPT | Mod: TH,S$PBB,, | Performed by: OBSTETRICS & GYNECOLOGY

## 2021-09-28 RX ORDER — METOCLOPRAMIDE 10 MG/1
10 TABLET ORAL EVERY 8 HOURS PRN
Qty: 20 TABLET | Refills: 0 | Status: SHIPPED | OUTPATIENT
Start: 2021-09-28 | End: 2021-12-17

## 2021-09-29 ENCOUNTER — PATIENT MESSAGE (OUTPATIENT)
Dept: OBSTETRICS AND GYNECOLOGY | Facility: CLINIC | Age: 31
End: 2021-09-29

## 2021-09-29 ENCOUNTER — HOSPITAL ENCOUNTER (OUTPATIENT)
Dept: CARDIOLOGY | Facility: CLINIC | Age: 31
Discharge: HOME | End: 2021-09-29
Payer: MEDICAID

## 2021-09-29 DIAGNOSIS — Z86.79 HISTORY OF CARDIAC ARRHYTHMIA: ICD-10-CM

## 2021-09-29 PROCEDURE — 93005 ELECTROCARDIOGRAM TRACING: CPT | Mod: PBBFAC | Performed by: INTERNAL MEDICINE

## 2021-09-29 PROCEDURE — 93010 EKG 12-LEAD: ICD-10-PCS | Mod: S$PBB,,, | Performed by: INTERNAL MEDICINE

## 2021-09-29 PROCEDURE — 93010 ELECTROCARDIOGRAM REPORT: CPT | Mod: S$PBB,,, | Performed by: INTERNAL MEDICINE

## 2021-09-30 ENCOUNTER — PATIENT MESSAGE (OUTPATIENT)
Dept: ADMINISTRATIVE | Facility: OTHER | Age: 31
End: 2021-09-30

## 2021-10-03 ENCOUNTER — PATIENT MESSAGE (OUTPATIENT)
Dept: OBSTETRICS AND GYNECOLOGY | Facility: CLINIC | Age: 31
End: 2021-10-03

## 2021-10-12 ENCOUNTER — TELEPHONE (OUTPATIENT)
Dept: OBSTETRICS AND GYNECOLOGY | Facility: CLINIC | Age: 31
End: 2021-10-12
Payer: MEDICAID

## 2021-10-14 ENCOUNTER — CLINICAL SUPPORT (OUTPATIENT)
Dept: REHABILITATION | Facility: OTHER | Age: 31
End: 2021-10-14
Attending: OBSTETRICS & GYNECOLOGY
Payer: MEDICAID

## 2021-10-14 ENCOUNTER — TELEPHONE (OUTPATIENT)
Dept: OBSTETRICS AND GYNECOLOGY | Facility: CLINIC | Age: 31
End: 2021-10-14

## 2021-10-14 DIAGNOSIS — O26.899 PREGNANCY RELATED BILATERAL LOWER ABDOMINAL PAIN, ANTEPARTUM: ICD-10-CM

## 2021-10-14 DIAGNOSIS — M25.559 PERIPARTUM HIP PAIN: ICD-10-CM

## 2021-10-14 DIAGNOSIS — O26.899 PERIPARTUM HIP PAIN: ICD-10-CM

## 2021-10-14 DIAGNOSIS — O26.892 PREGNANCY RELATED HIP PAIN IN SECOND TRIMESTER, ANTEPARTUM: ICD-10-CM

## 2021-10-14 DIAGNOSIS — R10.30 PREGNANCY RELATED BILATERAL LOWER ABDOMINAL PAIN, ANTEPARTUM: ICD-10-CM

## 2021-10-14 DIAGNOSIS — M25.559 PREGNANCY RELATED HIP PAIN IN SECOND TRIMESTER, ANTEPARTUM: ICD-10-CM

## 2021-10-14 PROCEDURE — 97163 PT EVAL HIGH COMPLEX 45 MIN: CPT | Mod: PN

## 2021-10-19 PROBLEM — M54.50 CHRONIC BILATERAL LOW BACK PAIN WITHOUT SCIATICA: Status: RESOLVED | Noted: 2019-06-27 | Resolved: 2021-10-19

## 2021-10-19 PROBLEM — M62.81 WEAKNESS OF TRUNK MUSCULATURE: Status: RESOLVED | Noted: 2019-06-27 | Resolved: 2021-10-19

## 2021-10-19 PROBLEM — M25.559: Status: ACTIVE | Noted: 2021-10-19

## 2021-10-19 PROBLEM — R29.3 ABNORMAL POSTURE: Status: RESOLVED | Noted: 2019-06-27 | Resolved: 2021-10-19

## 2021-10-19 PROBLEM — O26.899: Status: ACTIVE | Noted: 2021-10-19

## 2021-10-19 PROBLEM — R10.30 PREGNANCY RELATED BILATERAL LOWER ABDOMINAL PAIN, ANTEPARTUM: Status: ACTIVE | Noted: 2021-10-19

## 2021-10-19 PROBLEM — O26.899 PREGNANCY RELATED BILATERAL LOWER ABDOMINAL PAIN, ANTEPARTUM: Status: ACTIVE | Noted: 2021-10-19

## 2021-10-19 PROBLEM — G89.29 CHRONIC BILATERAL LOW BACK PAIN WITHOUT SCIATICA: Status: RESOLVED | Noted: 2019-06-27 | Resolved: 2021-10-19

## 2021-10-19 PROBLEM — M53.86 DECREASED RANGE OF MOTION OF LUMBAR SPINE: Status: RESOLVED | Noted: 2019-06-27 | Resolved: 2021-10-19

## 2021-10-20 ENCOUNTER — PATIENT MESSAGE (OUTPATIENT)
Dept: MATERNAL FETAL MEDICINE | Facility: CLINIC | Age: 31
End: 2021-10-20
Payer: MEDICAID

## 2021-10-21 ENCOUNTER — PROCEDURE VISIT (OUTPATIENT)
Dept: MATERNAL FETAL MEDICINE | Facility: CLINIC | Age: 31
End: 2021-10-21
Payer: MEDICAID

## 2021-10-21 DIAGNOSIS — O09.299 HISTORY OF PRIOR PREGNANCY WITH SHORT CERVIX, CURRENTLY PREGNANT: ICD-10-CM

## 2021-10-21 DIAGNOSIS — Z36.89 ENCOUNTER FOR ULTRASOUND TO CHECK FETAL GROWTH: ICD-10-CM

## 2021-10-21 DIAGNOSIS — Z36.86 ENCOUNTER FOR ANTENATAL SCREENING FOR CERVICAL LENGTH: Primary | ICD-10-CM

## 2021-10-21 PROCEDURE — 76817 PR US, OB, TRANSVAG APPROACH: ICD-10-PCS | Mod: 26,S$PBB,, | Performed by: OBSTETRICS & GYNECOLOGY

## 2021-10-21 PROCEDURE — 76816 PR  US,PREGNANT UTERUS,F/U,TRANSABD APP: ICD-10-PCS | Mod: 26,S$PBB,, | Performed by: OBSTETRICS & GYNECOLOGY

## 2021-10-21 PROCEDURE — 76817 TRANSVAGINAL US OBSTETRIC: CPT | Mod: 26,S$PBB,, | Performed by: OBSTETRICS & GYNECOLOGY

## 2021-10-21 PROCEDURE — 76816 OB US FOLLOW-UP PER FETUS: CPT | Mod: 26,S$PBB,, | Performed by: OBSTETRICS & GYNECOLOGY

## 2021-10-21 PROCEDURE — 76816 OB US FOLLOW-UP PER FETUS: CPT | Mod: PBBFAC | Performed by: OBSTETRICS & GYNECOLOGY

## 2021-10-21 PROCEDURE — 76817 TRANSVAGINAL US OBSTETRIC: CPT | Mod: PBBFAC | Performed by: OBSTETRICS & GYNECOLOGY

## 2021-10-22 ENCOUNTER — DOCUMENTATION ONLY (OUTPATIENT)
Dept: REHABILITATION | Facility: OTHER | Age: 31
End: 2021-10-22

## 2021-10-26 ENCOUNTER — ROUTINE PRENATAL (OUTPATIENT)
Dept: OBSTETRICS AND GYNECOLOGY | Facility: CLINIC | Age: 31
End: 2021-10-26
Attending: OBSTETRICS & GYNECOLOGY
Payer: MEDICAID

## 2021-10-26 VITALS — WEIGHT: 115.5 LBS | DIASTOLIC BLOOD PRESSURE: 62 MMHG | BODY MASS INDEX: 22.56 KG/M2 | SYSTOLIC BLOOD PRESSURE: 100 MMHG

## 2021-10-26 DIAGNOSIS — Z3A.16 PREGNANCY WITH 16 COMPLETED WEEKS GESTATION: ICD-10-CM

## 2021-10-26 DIAGNOSIS — Z34.80 SUPERVISION OF OTHER NORMAL PREGNANCY, ANTEPARTUM: Primary | ICD-10-CM

## 2021-10-26 DIAGNOSIS — O09.299 HISTORY OF PRIOR PREGNANCY WITH SHORT CERVIX, CURRENTLY PREGNANT: ICD-10-CM

## 2021-10-26 PROCEDURE — 99999 PR PBB SHADOW E&M-EST. PATIENT-LVL II: ICD-10-PCS | Mod: PBBFAC,,, | Performed by: OBSTETRICS & GYNECOLOGY

## 2021-10-26 PROCEDURE — 99213 PR OFFICE/OUTPT VISIT, EST, LEVL III, 20-29 MIN: ICD-10-PCS | Mod: TH,S$PBB,, | Performed by: OBSTETRICS & GYNECOLOGY

## 2021-10-26 PROCEDURE — 99212 OFFICE O/P EST SF 10 MIN: CPT | Mod: PBBFAC,TH | Performed by: OBSTETRICS & GYNECOLOGY

## 2021-10-26 PROCEDURE — 99213 OFFICE O/P EST LOW 20 MIN: CPT | Mod: TH,S$PBB,, | Performed by: OBSTETRICS & GYNECOLOGY

## 2021-10-26 PROCEDURE — 99999 PR PBB SHADOW E&M-EST. PATIENT-LVL II: CPT | Mod: PBBFAC,,, | Performed by: OBSTETRICS & GYNECOLOGY

## 2021-10-27 ENCOUNTER — DOCUMENTATION ONLY (OUTPATIENT)
Dept: REHABILITATION | Facility: OTHER | Age: 31
End: 2021-10-27
Payer: MEDICAID

## 2021-10-27 DIAGNOSIS — M25.559 PERIPARTUM HIP PAIN: Primary | ICD-10-CM

## 2021-10-27 DIAGNOSIS — O26.899 PERIPARTUM HIP PAIN: Primary | ICD-10-CM

## 2021-10-27 DIAGNOSIS — R10.30 PREGNANCY RELATED BILATERAL LOWER ABDOMINAL PAIN, ANTEPARTUM: ICD-10-CM

## 2021-10-27 DIAGNOSIS — O26.899 PREGNANCY RELATED BILATERAL LOWER ABDOMINAL PAIN, ANTEPARTUM: ICD-10-CM

## 2021-11-04 ENCOUNTER — PATIENT MESSAGE (OUTPATIENT)
Dept: MATERNAL FETAL MEDICINE | Facility: CLINIC | Age: 31
End: 2021-11-04
Payer: OTHER GOVERNMENT

## 2021-11-05 ENCOUNTER — PROCEDURE VISIT (OUTPATIENT)
Dept: MATERNAL FETAL MEDICINE | Facility: CLINIC | Age: 31
End: 2021-11-05
Payer: MEDICAID

## 2021-11-05 DIAGNOSIS — O09.299 HISTORY OF PRIOR PREGNANCY WITH SHORT CERVIX, CURRENTLY PREGNANT: ICD-10-CM

## 2021-11-05 PROCEDURE — 76815 PR  US,PREGNANT UTERUS,LIMITED, 1/> FETUSES: ICD-10-PCS | Mod: 26,S$PBB,, | Performed by: OBSTETRICS & GYNECOLOGY

## 2021-11-05 PROCEDURE — 76815 OB US LIMITED FETUS(S): CPT | Mod: PBBFAC | Performed by: OBSTETRICS & GYNECOLOGY

## 2021-11-05 PROCEDURE — 76817 TRANSVAGINAL US OBSTETRIC: CPT | Mod: 26,S$PBB,, | Performed by: OBSTETRICS & GYNECOLOGY

## 2021-11-05 PROCEDURE — 76817 PR US, OB, TRANSVAG APPROACH: ICD-10-PCS | Mod: 26,S$PBB,, | Performed by: OBSTETRICS & GYNECOLOGY

## 2021-11-05 PROCEDURE — 76815 OB US LIMITED FETUS(S): CPT | Mod: 26,S$PBB,, | Performed by: OBSTETRICS & GYNECOLOGY

## 2021-11-05 PROCEDURE — 76817 TRANSVAGINAL US OBSTETRIC: CPT | Mod: PBBFAC | Performed by: OBSTETRICS & GYNECOLOGY

## 2021-11-08 ENCOUNTER — TELEPHONE (OUTPATIENT)
Dept: OBSTETRICS AND GYNECOLOGY | Facility: CLINIC | Age: 31
End: 2021-11-08
Payer: OTHER GOVERNMENT

## 2021-11-18 ENCOUNTER — PATIENT MESSAGE (OUTPATIENT)
Dept: OBSTETRICS AND GYNECOLOGY | Facility: CLINIC | Age: 31
End: 2021-11-18
Payer: OTHER GOVERNMENT

## 2021-11-18 ENCOUNTER — PATIENT MESSAGE (OUTPATIENT)
Dept: MATERNAL FETAL MEDICINE | Facility: CLINIC | Age: 31
End: 2021-11-18
Payer: OTHER GOVERNMENT

## 2021-11-19 ENCOUNTER — ROUTINE PRENATAL (OUTPATIENT)
Dept: OBSTETRICS AND GYNECOLOGY | Facility: CLINIC | Age: 31
End: 2021-11-19
Attending: OBSTETRICS & GYNECOLOGY
Payer: OTHER GOVERNMENT

## 2021-11-19 ENCOUNTER — OFFICE VISIT (OUTPATIENT)
Dept: MATERNAL FETAL MEDICINE | Facility: CLINIC | Age: 31
End: 2021-11-19
Payer: OTHER GOVERNMENT

## 2021-11-19 ENCOUNTER — PROCEDURE VISIT (OUTPATIENT)
Dept: MATERNAL FETAL MEDICINE | Facility: CLINIC | Age: 31
End: 2021-11-19
Payer: OTHER GOVERNMENT

## 2021-11-19 VITALS — BODY MASS INDEX: 23.08 KG/M2 | SYSTOLIC BLOOD PRESSURE: 98 MMHG | DIASTOLIC BLOOD PRESSURE: 52 MMHG | WEIGHT: 118.19 LBS

## 2021-11-19 DIAGNOSIS — O28.3 ABNORMAL FETAL ULTRASOUND: ICD-10-CM

## 2021-11-19 DIAGNOSIS — O09.299 HISTORY OF PRIOR PREGNANCY WITH SHORT CERVIX, CURRENTLY PREGNANT: ICD-10-CM

## 2021-11-19 DIAGNOSIS — Z3A.20 PREGNANCY WITH 20 COMPLETED WEEKS GESTATION: ICD-10-CM

## 2021-11-19 DIAGNOSIS — Z34.80 SUPERVISION OF OTHER NORMAL PREGNANCY, ANTEPARTUM: Primary | ICD-10-CM

## 2021-11-19 DIAGNOSIS — O26.879 SHORT CERVIX AFFECTING PREGNANCY: ICD-10-CM

## 2021-11-19 DIAGNOSIS — Z36.89 ENCOUNTER FOR ULTRASOUND TO CHECK FETAL GROWTH: ICD-10-CM

## 2021-11-19 PROCEDURE — 0502F SUBSEQUENT PRENATAL CARE: CPT | Mod: S$PBB,,, | Performed by: OBSTETRICS & GYNECOLOGY

## 2021-11-19 PROCEDURE — 99212 OFFICE O/P EST SF 10 MIN: CPT | Mod: PBBFAC,TH,25 | Performed by: OBSTETRICS & GYNECOLOGY

## 2021-11-19 PROCEDURE — 0502F PR SUBSEQUENT PRENATAL CARE: ICD-10-PCS | Mod: S$PBB,,, | Performed by: OBSTETRICS & GYNECOLOGY

## 2021-11-19 PROCEDURE — 76811 OB US DETAILED SNGL FETUS: CPT | Mod: PBBFAC | Performed by: OBSTETRICS & GYNECOLOGY

## 2021-11-19 PROCEDURE — 99214 PR OFFICE/OUTPT VISIT, EST, LEVL IV, 30-39 MIN: ICD-10-PCS | Mod: S$PBB,25,, | Performed by: OBSTETRICS & GYNECOLOGY

## 2021-11-19 PROCEDURE — 76811 US MFM PROCEDURE (VIEWPOINT): ICD-10-PCS | Mod: 26,S$PBB,, | Performed by: OBSTETRICS & GYNECOLOGY

## 2021-11-19 PROCEDURE — 99214 OFFICE O/P EST MOD 30 MIN: CPT | Mod: S$PBB,25,, | Performed by: OBSTETRICS & GYNECOLOGY

## 2021-11-19 PROCEDURE — 76817 TRANSVAGINAL US OBSTETRIC: CPT | Mod: 26,S$PBB,, | Performed by: OBSTETRICS & GYNECOLOGY

## 2021-11-19 PROCEDURE — 99999 PR PBB SHADOW E&M-EST. PATIENT-LVL II: ICD-10-PCS | Mod: PBBFAC,,, | Performed by: OBSTETRICS & GYNECOLOGY

## 2021-11-19 PROCEDURE — 99999 PR PBB SHADOW E&M-EST. PATIENT-LVL II: CPT | Mod: PBBFAC,,, | Performed by: OBSTETRICS & GYNECOLOGY

## 2021-11-19 PROCEDURE — 76817 US MFM PROCEDURE (VIEWPOINT): ICD-10-PCS | Mod: 26,S$PBB,, | Performed by: OBSTETRICS & GYNECOLOGY

## 2021-11-19 RX ORDER — PROGESTERONE 200 MG/1
200 CAPSULE ORAL NIGHTLY
Qty: 30 CAPSULE | Refills: 4 | Status: SHIPPED | OUTPATIENT
Start: 2021-11-19 | End: 2022-03-14

## 2021-11-23 ENCOUNTER — ROUTINE PRENATAL (OUTPATIENT)
Dept: OBSTETRICS AND GYNECOLOGY | Facility: CLINIC | Age: 31
End: 2021-11-23
Attending: OBSTETRICS & GYNECOLOGY
Payer: OTHER GOVERNMENT

## 2021-11-23 ENCOUNTER — TELEPHONE (OUTPATIENT)
Dept: OBSTETRICS AND GYNECOLOGY | Facility: CLINIC | Age: 31
End: 2021-11-23
Payer: OTHER GOVERNMENT

## 2021-11-23 ENCOUNTER — PATIENT MESSAGE (OUTPATIENT)
Dept: REHABILITATION | Facility: OTHER | Age: 31
End: 2021-11-23
Payer: OTHER GOVERNMENT

## 2021-11-23 VITALS
DIASTOLIC BLOOD PRESSURE: 62 MMHG | BODY MASS INDEX: 23.08 KG/M2 | WEIGHT: 118.19 LBS | SYSTOLIC BLOOD PRESSURE: 118 MMHG

## 2021-11-23 DIAGNOSIS — Z34.80 SUPERVISION OF OTHER NORMAL PREGNANCY, ANTEPARTUM: Primary | ICD-10-CM

## 2021-11-23 DIAGNOSIS — Z3A.20 PREGNANCY WITH 20 COMPLETED WEEKS GESTATION: ICD-10-CM

## 2021-11-23 PROCEDURE — 99212 OFFICE O/P EST SF 10 MIN: CPT | Mod: PBBFAC,TH | Performed by: OBSTETRICS & GYNECOLOGY

## 2021-11-23 PROCEDURE — 99999 PR PBB SHADOW E&M-EST. PATIENT-LVL II: ICD-10-PCS | Mod: PBBFAC,,, | Performed by: OBSTETRICS & GYNECOLOGY

## 2021-11-23 PROCEDURE — 0502F PR SUBSEQUENT PRENATAL CARE: ICD-10-PCS | Mod: S$PBB,,, | Performed by: OBSTETRICS & GYNECOLOGY

## 2021-11-23 PROCEDURE — 99999 PR PBB SHADOW E&M-EST. PATIENT-LVL II: CPT | Mod: PBBFAC,,, | Performed by: OBSTETRICS & GYNECOLOGY

## 2021-11-23 PROCEDURE — 0502F SUBSEQUENT PRENATAL CARE: CPT | Mod: S$PBB,,, | Performed by: OBSTETRICS & GYNECOLOGY

## 2021-11-24 ENCOUNTER — TELEPHONE (OUTPATIENT)
Dept: OBSTETRICS AND GYNECOLOGY | Facility: CLINIC | Age: 31
End: 2021-11-24
Payer: OTHER GOVERNMENT

## 2021-11-24 ENCOUNTER — CLINICAL SUPPORT (OUTPATIENT)
Dept: REHABILITATION | Facility: OTHER | Age: 31
End: 2021-11-24
Payer: MEDICAID

## 2021-11-24 DIAGNOSIS — O26.899 PREGNANCY RELATED LOW BACK PAIN, ANTEPARTUM: ICD-10-CM

## 2021-11-24 DIAGNOSIS — M54.50 PREGNANCY RELATED LOW BACK PAIN, ANTEPARTUM: ICD-10-CM

## 2021-11-24 PROCEDURE — 97110 THERAPEUTIC EXERCISES: CPT | Mod: PN

## 2021-12-03 ENCOUNTER — ROUTINE PRENATAL (OUTPATIENT)
Dept: OBSTETRICS AND GYNECOLOGY | Facility: CLINIC | Age: 31
End: 2021-12-03
Attending: OBSTETRICS & GYNECOLOGY
Payer: OTHER GOVERNMENT

## 2021-12-03 VITALS — WEIGHT: 120.56 LBS | SYSTOLIC BLOOD PRESSURE: 94 MMHG | BODY MASS INDEX: 23.55 KG/M2 | DIASTOLIC BLOOD PRESSURE: 54 MMHG

## 2021-12-03 DIAGNOSIS — O26.879 SHORT CERVIX AFFECTING PREGNANCY: ICD-10-CM

## 2021-12-03 DIAGNOSIS — Z34.80 SUPERVISION OF OTHER NORMAL PREGNANCY, ANTEPARTUM: Primary | ICD-10-CM

## 2021-12-03 DIAGNOSIS — Z3A.22 PREGNANCY WITH 22 COMPLETED WEEKS GESTATION: ICD-10-CM

## 2021-12-03 PROCEDURE — 0502F PR SUBSEQUENT PRENATAL CARE: ICD-10-PCS | Mod: S$PBB,,, | Performed by: OBSTETRICS & GYNECOLOGY

## 2021-12-03 PROCEDURE — 99999 PR PBB SHADOW E&M-EST. PATIENT-LVL II: ICD-10-PCS | Mod: PBBFAC,,, | Performed by: OBSTETRICS & GYNECOLOGY

## 2021-12-03 PROCEDURE — 99999 PR PBB SHADOW E&M-EST. PATIENT-LVL II: CPT | Mod: PBBFAC,,, | Performed by: OBSTETRICS & GYNECOLOGY

## 2021-12-03 PROCEDURE — 99212 OFFICE O/P EST SF 10 MIN: CPT | Mod: PBBFAC,TH | Performed by: OBSTETRICS & GYNECOLOGY

## 2021-12-03 PROCEDURE — 0502F SUBSEQUENT PRENATAL CARE: CPT | Mod: S$PBB,,, | Performed by: OBSTETRICS & GYNECOLOGY

## 2021-12-05 ENCOUNTER — NURSE TRIAGE (OUTPATIENT)
Dept: ADMINISTRATIVE | Facility: CLINIC | Age: 31
End: 2021-12-05
Payer: OTHER GOVERNMENT

## 2021-12-07 ENCOUNTER — HOSPITAL ENCOUNTER (EMERGENCY)
Facility: OTHER | Age: 31
Discharge: HOME | End: 2021-12-07
Attending: OBSTETRICS & GYNECOLOGY
Payer: OTHER GOVERNMENT

## 2021-12-07 VITALS
SYSTOLIC BLOOD PRESSURE: 119 MMHG | DIASTOLIC BLOOD PRESSURE: 72 MMHG | RESPIRATION RATE: 20 BRPM | TEMPERATURE: 98 F | HEART RATE: 92 BPM | OXYGEN SATURATION: 99 %

## 2021-12-07 DIAGNOSIS — Z3A.22 22 WEEKS GESTATION OF PREGNANCY: ICD-10-CM

## 2021-12-07 DIAGNOSIS — N39.0 URINARY TRACT INFECTION WITHOUT HEMATURIA, SITE UNSPECIFIED: Primary | ICD-10-CM

## 2021-12-07 DIAGNOSIS — R10.2 PELVIC PAIN: ICD-10-CM

## 2021-12-07 LAB
BACTERIA #/AREA URNS HPF: ABNORMAL /HPF
BILIRUB UR QL STRIP: NEGATIVE
CANDIDA RRNA VAG QL PROBE: POSITIVE
CLARITY UR: CLEAR
COLOR UR: YELLOW
G VAGINALIS RRNA GENITAL QL PROBE: POSITIVE
GLUCOSE UR QL STRIP: NEGATIVE
HGB UR QL STRIP: NEGATIVE
HYALINE CASTS #/AREA URNS LPF: 0 /LPF
KETONES UR QL STRIP: NEGATIVE
LEUKOCYTE ESTERASE UR QL STRIP: ABNORMAL
MICROSCOPIC COMMENT: ABNORMAL
NITRITE UR QL STRIP: NEGATIVE
PH UR STRIP: 7 [PH] (ref 5–8)
PROT UR QL STRIP: NEGATIVE
RBC #/AREA URNS HPF: 2 /HPF (ref 0–4)
SP GR UR STRIP: 1.02 (ref 1–1.03)
SQUAMOUS #/AREA URNS HPF: 20 /HPF
T VAGINALIS RRNA GENITAL QL PROBE: NEGATIVE
URN SPEC COLLECT METH UR: ABNORMAL
UROBILINOGEN UR STRIP-ACNC: NEGATIVE EU/DL
WBC #/AREA URNS HPF: 50 /HPF (ref 0–5)

## 2021-12-07 PROCEDURE — 99284 EMERGENCY DEPT VISIT MOD MDM: CPT | Mod: ,,, | Performed by: OBSTETRICS & GYNECOLOGY

## 2021-12-07 PROCEDURE — 87591 N.GONORRHOEAE DNA AMP PROB: CPT | Performed by: OBSTETRICS & GYNECOLOGY

## 2021-12-07 PROCEDURE — 87480 CANDIDA DNA DIR PROBE: CPT | Performed by: OBSTETRICS & GYNECOLOGY

## 2021-12-07 PROCEDURE — 99284 EMERGENCY DEPT VISIT MOD MDM: CPT | Mod: 25

## 2021-12-07 PROCEDURE — 99284 PR EMERGENCY DEPT VISIT,LEVEL IV: ICD-10-PCS | Mod: ,,, | Performed by: OBSTETRICS & GYNECOLOGY

## 2021-12-07 PROCEDURE — 81000 URINALYSIS NONAUTO W/SCOPE: CPT | Performed by: STUDENT IN AN ORGANIZED HEALTH CARE EDUCATION/TRAINING PROGRAM

## 2021-12-07 PROCEDURE — 87491 CHLMYD TRACH DNA AMP PROBE: CPT | Performed by: OBSTETRICS & GYNECOLOGY

## 2021-12-07 PROCEDURE — 25000003 PHARM REV CODE 250: Performed by: STUDENT IN AN ORGANIZED HEALTH CARE EDUCATION/TRAINING PROGRAM

## 2021-12-07 PROCEDURE — 87086 URINE CULTURE/COLONY COUNT: CPT | Performed by: STUDENT IN AN ORGANIZED HEALTH CARE EDUCATION/TRAINING PROGRAM

## 2021-12-07 RX ORDER — NITROFURANTOIN 25; 75 MG/1; MG/1
100 CAPSULE ORAL 2 TIMES DAILY
Qty: 14 CAPSULE | Refills: 0 | Status: SHIPPED | OUTPATIENT
Start: 2021-12-07 | End: 2021-12-14

## 2021-12-07 RX ORDER — ACETAMINOPHEN 500 MG
1000 TABLET ORAL ONCE
Status: COMPLETED | OUTPATIENT
Start: 2021-12-07 | End: 2021-12-07

## 2021-12-07 RX ADMIN — ACETAMINOPHEN 1000 MG: 500 TABLET ORAL at 09:12

## 2021-12-08 ENCOUNTER — CLINICAL SUPPORT (OUTPATIENT)
Dept: REHABILITATION | Facility: OTHER | Age: 31
End: 2021-12-08
Payer: MEDICAID

## 2021-12-08 DIAGNOSIS — M54.50 PREGNANCY RELATED LOW BACK PAIN, ANTEPARTUM: ICD-10-CM

## 2021-12-08 DIAGNOSIS — M25.559 PREGNANCY RELATED HIP PAIN, ANTEPARTUM: ICD-10-CM

## 2021-12-08 DIAGNOSIS — O26.899 PREGNANCY RELATED HIP PAIN, ANTEPARTUM: ICD-10-CM

## 2021-12-08 DIAGNOSIS — O26.899 PREGNANCY RELATED LOW BACK PAIN, ANTEPARTUM: ICD-10-CM

## 2021-12-08 LAB — BACTERIA UR CULT: NORMAL

## 2021-12-08 PROCEDURE — 97110 THERAPEUTIC EXERCISES: CPT | Mod: PN

## 2021-12-08 PROCEDURE — 97140 MANUAL THERAPY 1/> REGIONS: CPT | Mod: PN

## 2021-12-10 ENCOUNTER — ROUTINE PRENATAL (OUTPATIENT)
Dept: OBSTETRICS AND GYNECOLOGY | Facility: CLINIC | Age: 31
End: 2021-12-10
Attending: OBSTETRICS & GYNECOLOGY
Payer: OTHER GOVERNMENT

## 2021-12-10 VITALS — SYSTOLIC BLOOD PRESSURE: 98 MMHG | WEIGHT: 124.75 LBS | DIASTOLIC BLOOD PRESSURE: 64 MMHG | BODY MASS INDEX: 24.37 KG/M2

## 2021-12-10 DIAGNOSIS — O26.879 SHORT CERVIX AFFECTING PREGNANCY: ICD-10-CM

## 2021-12-10 DIAGNOSIS — Z3A.23 PREGNANCY WITH 23 COMPLETED WEEKS GESTATION: ICD-10-CM

## 2021-12-10 DIAGNOSIS — Z34.80 SUPERVISION OF OTHER NORMAL PREGNANCY, ANTEPARTUM: Primary | ICD-10-CM

## 2021-12-10 LAB
C TRACH DNA SPEC QL NAA+PROBE: NOT DETECTED
N GONORRHOEA DNA SPEC QL NAA+PROBE: NOT DETECTED

## 2021-12-10 PROCEDURE — 99999 PR PBB SHADOW E&M-EST. PATIENT-LVL II: ICD-10-PCS | Mod: PBBFAC,,, | Performed by: OBSTETRICS & GYNECOLOGY

## 2021-12-10 PROCEDURE — 0502F PR SUBSEQUENT PRENATAL CARE: ICD-10-PCS | Mod: S$PBB,,, | Performed by: OBSTETRICS & GYNECOLOGY

## 2021-12-10 PROCEDURE — 99212 OFFICE O/P EST SF 10 MIN: CPT | Mod: PBBFAC,TH | Performed by: OBSTETRICS & GYNECOLOGY

## 2021-12-10 PROCEDURE — 99999 PR PBB SHADOW E&M-EST. PATIENT-LVL II: CPT | Mod: PBBFAC,,, | Performed by: OBSTETRICS & GYNECOLOGY

## 2021-12-10 PROCEDURE — 0502F SUBSEQUENT PRENATAL CARE: CPT | Mod: S$PBB,,, | Performed by: OBSTETRICS & GYNECOLOGY

## 2021-12-10 RX ORDER — FLUCONAZOLE 150 MG/1
150 TABLET ORAL ONCE
Qty: 1 TABLET | Refills: 0 | Status: SHIPPED | OUTPATIENT
Start: 2021-12-10 | End: 2021-12-10

## 2021-12-10 RX ORDER — METRONIDAZOLE 500 MG/1
500 TABLET ORAL EVERY 12 HOURS
Qty: 14 TABLET | Refills: 0 | Status: SHIPPED | OUTPATIENT
Start: 2021-12-10 | End: 2021-12-17

## 2021-12-16 ENCOUNTER — PATIENT MESSAGE (OUTPATIENT)
Dept: MATERNAL FETAL MEDICINE | Facility: CLINIC | Age: 31
End: 2021-12-16
Payer: OTHER GOVERNMENT

## 2021-12-17 ENCOUNTER — ROUTINE PRENATAL (OUTPATIENT)
Dept: OBSTETRICS AND GYNECOLOGY | Facility: CLINIC | Age: 31
End: 2021-12-17
Attending: OBSTETRICS & GYNECOLOGY
Payer: OTHER GOVERNMENT

## 2021-12-17 VITALS
BODY MASS INDEX: 24.11 KG/M2 | SYSTOLIC BLOOD PRESSURE: 120 MMHG | DIASTOLIC BLOOD PRESSURE: 62 MMHG | WEIGHT: 123.44 LBS

## 2021-12-17 DIAGNOSIS — Z34.80 SUPERVISION OF OTHER NORMAL PREGNANCY, ANTEPARTUM: Primary | ICD-10-CM

## 2021-12-17 DIAGNOSIS — Z3A.24 PREGNANCY WITH 24 COMPLETED WEEKS GESTATION: ICD-10-CM

## 2021-12-17 DIAGNOSIS — O26.879 SHORT CERVIX AFFECTING PREGNANCY: ICD-10-CM

## 2021-12-17 PROCEDURE — 99212 OFFICE O/P EST SF 10 MIN: CPT | Mod: PBBFAC,TH | Performed by: OBSTETRICS & GYNECOLOGY

## 2021-12-17 PROCEDURE — 99213 PR OFFICE/OUTPT VISIT, EST, LEVL III, 20-29 MIN: ICD-10-PCS | Mod: S$PBB,,, | Performed by: OBSTETRICS & GYNECOLOGY

## 2021-12-17 PROCEDURE — 99999 PR PBB SHADOW E&M-EST. PATIENT-LVL II: ICD-10-PCS | Mod: PBBFAC,,, | Performed by: OBSTETRICS & GYNECOLOGY

## 2021-12-17 PROCEDURE — 99999 PR PBB SHADOW E&M-EST. PATIENT-LVL II: CPT | Mod: PBBFAC,,, | Performed by: OBSTETRICS & GYNECOLOGY

## 2021-12-17 PROCEDURE — 99213 OFFICE O/P EST LOW 20 MIN: CPT | Mod: S$PBB,,, | Performed by: OBSTETRICS & GYNECOLOGY

## 2021-12-20 ENCOUNTER — HOSPITAL ENCOUNTER (EMERGENCY)
Facility: OTHER | Age: 31
Discharge: HOME | End: 2021-12-20
Attending: OBSTETRICS & GYNECOLOGY
Payer: OTHER GOVERNMENT

## 2021-12-20 ENCOUNTER — NURSE TRIAGE (OUTPATIENT)
Dept: ADMINISTRATIVE | Facility: CLINIC | Age: 31
End: 2021-12-20
Payer: OTHER GOVERNMENT

## 2021-12-20 VITALS
OXYGEN SATURATION: 98 % | RESPIRATION RATE: 16 BRPM | BODY MASS INDEX: 24.02 KG/M2 | TEMPERATURE: 99 F | HEART RATE: 112 BPM | DIASTOLIC BLOOD PRESSURE: 53 MMHG | WEIGHT: 123 LBS | SYSTOLIC BLOOD PRESSURE: 117 MMHG

## 2021-12-20 DIAGNOSIS — R07.89 CHEST WALL PAIN: ICD-10-CM

## 2021-12-20 DIAGNOSIS — Z3A.24 24 WEEKS GESTATION OF PREGNANCY: ICD-10-CM

## 2021-12-20 DIAGNOSIS — R07.9 CHEST PAIN: ICD-10-CM

## 2021-12-20 DIAGNOSIS — U07.1 COVID-19: Primary | ICD-10-CM

## 2021-12-20 LAB
ALBUMIN SERPL BCP-MCNC: 3.4 G/DL (ref 3.5–5.2)
ALP SERPL-CCNC: 56 U/L (ref 55–135)
ALT SERPL W/O P-5'-P-CCNC: 13 U/L (ref 10–44)
ANION GAP SERPL CALC-SCNC: 12 MMOL/L (ref 8–16)
AST SERPL-CCNC: 17 U/L (ref 10–40)
BASOPHILS # BLD AUTO: 0.04 K/UL (ref 0–0.2)
BASOPHILS NFR BLD: 0.5 % (ref 0–1.9)
BILIRUB SERPL-MCNC: 0.4 MG/DL (ref 0.1–1)
BNP SERPL-MCNC: <10 PG/ML (ref 0–99)
BUN SERPL-MCNC: 8 MG/DL (ref 6–20)
CALCIUM SERPL-MCNC: 8.6 MG/DL (ref 8.7–10.5)
CHLORIDE SERPL-SCNC: 105 MMOL/L (ref 95–110)
CO2 SERPL-SCNC: 19 MMOL/L (ref 23–29)
CREAT SERPL-MCNC: 0.6 MG/DL (ref 0.5–1.4)
DIFFERENTIAL METHOD: ABNORMAL
EOSINOPHIL # BLD AUTO: 0.3 K/UL (ref 0–0.5)
EOSINOPHIL NFR BLD: 3.2 % (ref 0–8)
ERYTHROCYTE [DISTWIDTH] IN BLOOD BY AUTOMATED COUNT: 14.6 % (ref 11.5–14.5)
EST. GFR  (AFRICAN AMERICAN): >60 ML/MIN/1.73 M^2
EST. GFR  (NON AFRICAN AMERICAN): >60 ML/MIN/1.73 M^2
GLUCOSE SERPL-MCNC: 97 MG/DL (ref 70–110)
HCT VFR BLD AUTO: 32.5 % (ref 37–48.5)
HGB BLD-MCNC: 10.7 G/DL (ref 12–16)
IMM GRANULOCYTES # BLD AUTO: 0.1 K/UL (ref 0–0.04)
IMM GRANULOCYTES NFR BLD AUTO: 1.1 % (ref 0–0.5)
LYMPHOCYTES # BLD AUTO: 0.5 K/UL (ref 1–4.8)
LYMPHOCYTES NFR BLD: 5.2 % (ref 18–48)
MCH RBC QN AUTO: 29.2 PG (ref 27–31)
MCHC RBC AUTO-ENTMCNC: 32.9 G/DL (ref 32–36)
MCV RBC AUTO: 89 FL (ref 82–98)
MONOCYTES # BLD AUTO: 1 K/UL (ref 0.3–1)
MONOCYTES NFR BLD: 10.8 % (ref 4–15)
NEUTROPHILS # BLD AUTO: 6.9 K/UL (ref 1.8–7.7)
NEUTROPHILS NFR BLD: 79.2 % (ref 38–73)
NRBC BLD-RTO: 0 /100 WBC
PLATELET # BLD AUTO: 188 K/UL (ref 150–450)
PMV BLD AUTO: 10.5 FL (ref 9.2–12.9)
POTASSIUM SERPL-SCNC: 3.5 MMOL/L (ref 3.5–5.1)
PROT SERPL-MCNC: 6.7 G/DL (ref 6–8.4)
RBC # BLD AUTO: 3.67 M/UL (ref 4–5.4)
SARS-COV-2 RDRP RESP QL NAA+PROBE: POSITIVE
SODIUM SERPL-SCNC: 136 MMOL/L (ref 136–145)
TROPONIN I SERPL DL<=0.01 NG/ML-MCNC: <0.006 NG/ML (ref 0–0.03)
WBC # BLD AUTO: 8.77 K/UL (ref 3.9–12.7)

## 2021-12-20 PROCEDURE — 96361 HYDRATE IV INFUSION ADD-ON: CPT

## 2021-12-20 PROCEDURE — 96360 HYDRATION IV INFUSION INIT: CPT

## 2021-12-20 PROCEDURE — 99284 PR EMERGENCY DEPT VISIT,LEVEL IV: ICD-10-PCS | Mod: 25,,, | Performed by: OBSTETRICS & GYNECOLOGY

## 2021-12-20 PROCEDURE — 84484 ASSAY OF TROPONIN QUANT: CPT | Performed by: STUDENT IN AN ORGANIZED HEALTH CARE EDUCATION/TRAINING PROGRAM

## 2021-12-20 PROCEDURE — 99284 EMERGENCY DEPT VISIT MOD MDM: CPT | Mod: 25,,, | Performed by: OBSTETRICS & GYNECOLOGY

## 2021-12-20 PROCEDURE — 63600175 PHARM REV CODE 636 W HCPCS: Performed by: STUDENT IN AN ORGANIZED HEALTH CARE EDUCATION/TRAINING PROGRAM

## 2021-12-20 PROCEDURE — 59025 PR FETAL 2N-STRESS TEST: ICD-10-PCS | Mod: 26,,, | Performed by: OBSTETRICS & GYNECOLOGY

## 2021-12-20 PROCEDURE — 80053 COMPREHEN METABOLIC PANEL: CPT | Performed by: STUDENT IN AN ORGANIZED HEALTH CARE EDUCATION/TRAINING PROGRAM

## 2021-12-20 PROCEDURE — 85025 COMPLETE CBC W/AUTO DIFF WBC: CPT | Performed by: STUDENT IN AN ORGANIZED HEALTH CARE EDUCATION/TRAINING PROGRAM

## 2021-12-20 PROCEDURE — U0002 COVID-19 LAB TEST NON-CDC: HCPCS | Performed by: STUDENT IN AN ORGANIZED HEALTH CARE EDUCATION/TRAINING PROGRAM

## 2021-12-20 PROCEDURE — 59025 FETAL NON-STRESS TEST: CPT

## 2021-12-20 PROCEDURE — M0243 CASIRIVI AND IMDEVI INFUSION: HCPCS | Performed by: STUDENT IN AN ORGANIZED HEALTH CARE EDUCATION/TRAINING PROGRAM

## 2021-12-20 PROCEDURE — 99284 EMERGENCY DEPT VISIT MOD MDM: CPT | Mod: 25

## 2021-12-20 PROCEDURE — 83880 ASSAY OF NATRIURETIC PEPTIDE: CPT | Performed by: STUDENT IN AN ORGANIZED HEALTH CARE EDUCATION/TRAINING PROGRAM

## 2021-12-20 PROCEDURE — 25000003 PHARM REV CODE 250: Performed by: STUDENT IN AN ORGANIZED HEALTH CARE EDUCATION/TRAINING PROGRAM

## 2021-12-20 PROCEDURE — 59025 FETAL NON-STRESS TEST: CPT | Mod: 26,,, | Performed by: OBSTETRICS & GYNECOLOGY

## 2021-12-20 RX ORDER — ONDANSETRON 4 MG/1
4 TABLET, ORALLY DISINTEGRATING ORAL ONCE AS NEEDED
Status: DISCONTINUED | OUTPATIENT
Start: 2021-12-20 | End: 2021-12-20 | Stop reason: HOSPADM

## 2021-12-20 RX ORDER — SODIUM CHLORIDE 0.9 % (FLUSH) 0.9 %
10 SYRINGE (ML) INJECTION
Status: DISCONTINUED | OUTPATIENT
Start: 2021-12-20 | End: 2021-12-20 | Stop reason: HOSPADM

## 2021-12-20 RX ORDER — DIPHENHYDRAMINE HYDROCHLORIDE 50 MG/ML
25 INJECTION INTRAMUSCULAR; INTRAVENOUS ONCE AS NEEDED
Status: DISCONTINUED | OUTPATIENT
Start: 2021-12-20 | End: 2021-12-20 | Stop reason: HOSPADM

## 2021-12-20 RX ORDER — SODIUM CITRATE AND CITRIC ACID MONOHYDRATE 334; 500 MG/5ML; MG/5ML
30 SOLUTION ORAL ONCE
Status: COMPLETED | OUTPATIENT
Start: 2021-12-20 | End: 2021-12-20

## 2021-12-20 RX ORDER — EPINEPHRINE 0.3 MG/.3ML
0.3 INJECTION SUBCUTANEOUS
Status: DISCONTINUED | OUTPATIENT
Start: 2021-12-20 | End: 2021-12-20 | Stop reason: HOSPADM

## 2021-12-20 RX ORDER — ACETAMINOPHEN 325 MG/1
650 TABLET ORAL ONCE AS NEEDED
Status: DISCONTINUED | OUTPATIENT
Start: 2021-12-20 | End: 2021-12-20 | Stop reason: HOSPADM

## 2021-12-20 RX ORDER — ALBUTEROL SULFATE 90 UG/1
2 AEROSOL, METERED RESPIRATORY (INHALATION)
Status: DISCONTINUED | OUTPATIENT
Start: 2021-12-20 | End: 2021-12-20 | Stop reason: HOSPADM

## 2021-12-20 RX ADMIN — SODIUM CITRATE AND CITRIC ACID MONOHYDRATE 30 ML: 500; 334 SOLUTION ORAL at 03:12

## 2021-12-20 RX ADMIN — CASIRIVIMAB AND IMDEVIMAB 600 MG: 600; 600 INJECTION, SOLUTION, CONCENTRATE INTRAVENOUS at 05:12

## 2021-12-20 RX ADMIN — SODIUM CHLORIDE, SODIUM LACTATE, POTASSIUM CHLORIDE, AND CALCIUM CHLORIDE 1000 ML: .6; .31; .03; .02 INJECTION, SOLUTION INTRAVENOUS at 03:12

## 2021-12-22 ENCOUNTER — PATIENT MESSAGE (OUTPATIENT)
Dept: OBSTETRICS AND GYNECOLOGY | Facility: CLINIC | Age: 31
End: 2021-12-22
Payer: OTHER GOVERNMENT

## 2021-12-28 ENCOUNTER — PATIENT MESSAGE (OUTPATIENT)
Dept: OBSTETRICS AND GYNECOLOGY | Facility: CLINIC | Age: 31
End: 2021-12-28
Payer: OTHER GOVERNMENT

## 2021-12-30 ENCOUNTER — PATIENT MESSAGE (OUTPATIENT)
Dept: MATERNAL FETAL MEDICINE | Facility: CLINIC | Age: 31
End: 2021-12-30
Payer: OTHER GOVERNMENT

## 2021-12-30 ENCOUNTER — CLINICAL SUPPORT (OUTPATIENT)
Dept: REHABILITATION | Facility: OTHER | Age: 31
End: 2021-12-30
Payer: MEDICAID

## 2021-12-30 DIAGNOSIS — O26.899 PREGNANCY RELATED HIP PAIN, ANTEPARTUM: ICD-10-CM

## 2021-12-30 DIAGNOSIS — M54.50 PREGNANCY RELATED LOW BACK PAIN, ANTEPARTUM: ICD-10-CM

## 2021-12-30 DIAGNOSIS — O26.899 PREGNANCY RELATED LOW BACK PAIN, ANTEPARTUM: ICD-10-CM

## 2021-12-30 DIAGNOSIS — M25.559 PREGNANCY RELATED HIP PAIN, ANTEPARTUM: ICD-10-CM

## 2021-12-30 PROCEDURE — 97110 THERAPEUTIC EXERCISES: CPT | Mod: PN

## 2021-12-30 PROCEDURE — 97140 MANUAL THERAPY 1/> REGIONS: CPT | Mod: PN

## 2022-01-03 ENCOUNTER — PROCEDURE VISIT (OUTPATIENT)
Dept: MATERNAL FETAL MEDICINE | Facility: CLINIC | Age: 32
End: 2022-01-03
Attending: OBSTETRICS & GYNECOLOGY
Payer: MEDICAID

## 2022-01-03 DIAGNOSIS — O09.299 HISTORY OF PRIOR PREGNANCY WITH SHORT CERVIX, CURRENTLY PREGNANT: ICD-10-CM

## 2022-01-03 DIAGNOSIS — Z36.89 ENCOUNTER FOR ULTRASOUND TO CHECK FETAL GROWTH: ICD-10-CM

## 2022-01-05 ENCOUNTER — CLINICAL SUPPORT (OUTPATIENT)
Dept: REHABILITATION | Facility: OTHER | Age: 32
End: 2022-01-05
Attending: OBSTETRICS & GYNECOLOGY
Payer: MEDICAID

## 2022-01-05 DIAGNOSIS — M54.50 PREGNANCY RELATED LOW BACK PAIN, ANTEPARTUM: ICD-10-CM

## 2022-01-05 DIAGNOSIS — O26.899 PREGNANCY RELATED LOW BACK PAIN, ANTEPARTUM: ICD-10-CM

## 2022-01-05 DIAGNOSIS — M25.559 PREGNANCY RELATED HIP PAIN, ANTEPARTUM: ICD-10-CM

## 2022-01-05 DIAGNOSIS — O26.899 PREGNANCY RELATED HIP PAIN, ANTEPARTUM: ICD-10-CM

## 2022-01-05 PROCEDURE — 97110 THERAPEUTIC EXERCISES: CPT | Mod: PN

## 2022-01-05 NOTE — PROGRESS NOTES
"[facility]   Physical Therapy Treatment Note     Name: Barbie Kumar  Clinic Number: [id number]    Therapy Diagnosis:   Encounter Diagnoses   Name Primary?    Pregnancy related low back pain, antepartum     Pregnancy related hip pain, antepartum      Physician: Milton Sanchez MD    Visit Date: 1/5/2022    Physician Orders: PT Eval and Treat   Medical Diagnosis from Referral: O26.892,M25.559 (ICD-10-CM) - Pregnancy related hip pain in second trimester, antepartum  Evaluation Date: 11/24/2021  Authorization Period Expiration: 9/28/2022  Plan of Care Expiration: 2/14/2022  Progress Note Due: 1/30/2022  Visit # / Visits authorized: 4/ 20   FOTO: #1 on 10/14/2021, 11/24/2021, 12/30/2021    PTA Visit #: 0/5     Precautions: Standard, gestation 20 weeks as of eval (ANTIONETTE is April 2022)     Time In: 10:05 am  Time out: 11:05 am  Total Billable Time: 60 minutes    SUBJECTIVE     Pt reports: she felt much better after previous visit but symptoms were short lived. Says that using heating pad is only temporary relief. reports using prenatal yoga stretches 2-3x/day to help reduce pain. Pain is primarily across the low back and down both glutes today.      She was compliant with home exercise program.  Response to previous treatment: no adverse response reported  Functional change: none    Pain: 4/10  Location: pelvic floor and lumbar spine    OBJECTIVE     Objective Measures updated at progress report unless specified.     Treatment     Barbie received the treatments listed below:      therapeutic exercises to develop strength, ROM, flexibility and core stabilization for 30 minutes including:  Hip adduction 2x10 5"  Hip abduction GTB 2x10  TRA contraction 2x10  diaphragmatic breathing 1 min  butterfly stretch 1 min  seated HS stretch 2x30"  seated piriformis stretch 2x30"  seated sciatic nerve glides    Not today:  Seated pelvic tilts 1 x 10  standing trunk flexion 1 x 10  Qped laxmi pose " "stretch (knee position to tolerance) 3 x 20"  Qped tail wags 1 x 10  Qped cat/cow 1 x 10 - sequence with exhalation    manual therapy techniques: Soft tissue Mobilization were applied to the: lumbar spine and glutes for 30 minutes, including:  STM to lumbar paraspinals with pt positioned in prone in pregnancy pillow  Foam rolling glutes to alleviate tension surrounding sciatic nerve  Shotgun MET to alleviate pubic symphysis pain    Modalities: 15 min x ice to low back and glutes, performed simultaneous with supine therex      Patient Education and Home Exercises     Home Exercises Provided and Patient Education Provided     Education provided:   - TRA contraction, shotgun MET at home for pubic symphysis pain    Written Home Exercises Provided: yes. Exercises were reviewed and Barbie was able to demonstrate them prior to the end of the session.  Barbie demonstrated good  understanding of the education provided. See EMR under Patient Instructions for exercises provided during therapy sessions    ASSESSMENT     Pt presents with increased tenderness to palpation over L>R lumbar paraspinals, L5-S1 interspinous space, L>R glutes max/med and piriformis notable lumbar extension in all positions. Fair tolerance with STM today but noted increased "sharp" pain when attempting to do seated stretches immediately afterwards. Modalities used today, including ice, to reduce pain and improve mobility. Pt notes marked improvement in pain to 2/10 after use of ice. Purchase information provided with education on including benefits, parameters, side effects, and skin precautions as pt verbalized that she will go out and buy an ice pack as she has such a great relief in pain. Able to perform all therex in reclined position today without sx while on ice.    Pt demonstrated good understanding of performing TRA contraction when performing bed mobility. Pt will continue to benefit from skilled PT focused on improving core activation and " proximal hip strength and decreasing myofascial restriction surrounding lumbar spine and sciatic nerves so that she can stand for more than 30 minutes without an increase in pain    Barbie is progressing well towards her goals.   Pt prognosis is Good.     Pt will continue to benefit from skilled outpatient physical therapy to address the deficits listed in the problem list box on initial evaluation, provide pt/family education and to maximize pt's level of independence in the home and community environment.     Pt's spiritual, cultural and educational needs considered and pt agreeable to plan of care and goals.     Anticipated barriers to physical therapy: standard, pregnancy, works full time, also in school for bachelor's degree    Goals:   Short Term Goals (6 Weeks):  1. Pt able to stand >=30 minutes with ADLs with <4/10 pain. (progressing, not met)  2. Pt able to walk >=30 minutes with household chores with <4/10 pain. (progressing, not met)  3.  Pt able to transfer in/out of chairs of various heights with <4/10 pain. (goal met 12/30/2021)  4. Pt to demonstrate improved functional ability with FOTO limitation <=68% disability. (progressing, not met)     Long Term Goals (12 Weeks):  1. Pt able to stand >=1 hour with with ADLs with <3/10 pain. (progressing, not met)  2. Pt able to walk >=1 hour with household chores with <3/10 pain. (progressing, not met)  3.  Pt able to squat/lift 5# from floor to waist with <3/10 pain. (progressing, not met)  4. Pt able to ascend/descend 1 flight of stairs, independently, 1 handrail, with <3/10 pain. (progressing, not met)  5. Pt able to sit >= 1 hour with CPU work with <3/10 pain. (progressing, not met)  6. Pt is independent with all bed mobility. (progressing, not met)  7. Pt will be independent with HEP and self management of symptoms. (progressing, not met)  8. Pt to demonstrate improved functional ability with FOTO limitation <=58% disability. (progressing, not met)    PLAN      Next visit: continue with manual therapy PRN, check that pt is myron TRA correctly, progress core and hip strengthening as tolerated    Isabella Cochran, PT

## 2022-01-06 ENCOUNTER — PATIENT MESSAGE (OUTPATIENT)
Dept: REHABILITATION | Facility: OTHER | Age: 32
End: 2022-01-06
Payer: OTHER GOVERNMENT

## 2022-01-06 ENCOUNTER — ROUTINE PRENATAL (OUTPATIENT)
Dept: OBSTETRICS AND GYNECOLOGY | Facility: CLINIC | Age: 32
End: 2022-01-06
Attending: OBSTETRICS & GYNECOLOGY
Payer: OTHER GOVERNMENT

## 2022-01-06 ENCOUNTER — PATIENT MESSAGE (OUTPATIENT)
Dept: OBSTETRICS AND GYNECOLOGY | Facility: CLINIC | Age: 32
End: 2022-01-06

## 2022-01-06 ENCOUNTER — TELEPHONE (OUTPATIENT)
Dept: OBSTETRICS AND GYNECOLOGY | Facility: CLINIC | Age: 32
End: 2022-01-06
Payer: OTHER GOVERNMENT

## 2022-01-06 VITALS
BODY MASS INDEX: 24.54 KG/M2 | WEIGHT: 125.69 LBS | SYSTOLIC BLOOD PRESSURE: 110 MMHG | DIASTOLIC BLOOD PRESSURE: 72 MMHG

## 2022-01-06 DIAGNOSIS — O26.879 SHORT CERVIX AFFECTING PREGNANCY: ICD-10-CM

## 2022-01-06 DIAGNOSIS — Z34.80 SUPERVISION OF OTHER NORMAL PREGNANCY, ANTEPARTUM: Primary | ICD-10-CM

## 2022-01-06 DIAGNOSIS — Z3A.27 PREGNANCY WITH 27 COMPLETED WEEKS GESTATION: ICD-10-CM

## 2022-01-06 PROCEDURE — 0502F SUBSEQUENT PRENATAL CARE: CPT | Mod: S$PBB,,, | Performed by: OBSTETRICS & GYNECOLOGY

## 2022-01-06 PROCEDURE — 99212 OFFICE O/P EST SF 10 MIN: CPT | Mod: PBBFAC | Performed by: OBSTETRICS & GYNECOLOGY

## 2022-01-06 PROCEDURE — 0502F PR SUBSEQUENT PRENATAL CARE: ICD-10-PCS | Mod: S$PBB,,, | Performed by: OBSTETRICS & GYNECOLOGY

## 2022-01-06 PROCEDURE — 99999 PR PBB SHADOW E&M-EST. PATIENT-LVL II: CPT | Mod: PBBFAC,,, | Performed by: OBSTETRICS & GYNECOLOGY

## 2022-01-06 PROCEDURE — 99999 PR PBB SHADOW E&M-EST. PATIENT-LVL II: ICD-10-PCS | Mod: PBBFAC,,, | Performed by: OBSTETRICS & GYNECOLOGY

## 2022-01-06 NOTE — PROGRESS NOTES
Good FM.  Denies bleeding, cramping, and pressure.  Cx: closed.  Reports significant improvement in pelvic / back discomfort with PT.  Now with only mild low back discomfort, radiates into buttocks - discussed.  .  Discussed anemia: PNV and iron.  MFM sono at 26 weeks with EFW 42%.   Return 1-2 weeks.      Vitals signs, FHTs, urine dip, and PE findings documented, reviewed and available in OB flow chart.       I spent a total of 20 minutes on the day of the visit. This includes face to face time and non-face to face time preparing to see the patient (eg, review of tests), Obtaining and/or reviewing separately obtained history, Documenting clinical information in the electronic or other health record, Independently interpreting results and communicating results to the patient/family/caregiver, or Care coordination.

## 2022-01-06 NOTE — TELEPHONE ENCOUNTER
----- Message from Lola Hsieh sent at 1/6/2022  8:49 AM CST -----  Regarding: Appt  Name of Who is Calling: ZURI CASTANON [[id number]]          What is the request in detail: Pt is requesting a call back, she states a nurse was suppose to call back for a sooner OB appt.           Can the clinic reply by [facility]: yes          [facility]: [phone]

## 2022-01-11 ENCOUNTER — ROUTINE PRENATAL (OUTPATIENT)
Dept: OBSTETRICS AND GYNECOLOGY | Facility: CLINIC | Age: 32
End: 2022-01-11
Attending: OBSTETRICS & GYNECOLOGY
Payer: OTHER GOVERNMENT

## 2022-01-11 VITALS
WEIGHT: 128.75 LBS | BODY MASS INDEX: 25.14 KG/M2 | SYSTOLIC BLOOD PRESSURE: 132 MMHG | DIASTOLIC BLOOD PRESSURE: 64 MMHG

## 2022-01-11 DIAGNOSIS — Z3A.27 PREGNANCY WITH 27 COMPLETED WEEKS GESTATION: ICD-10-CM

## 2022-01-11 DIAGNOSIS — Z34.80 SUPERVISION OF OTHER NORMAL PREGNANCY, ANTEPARTUM: Primary | ICD-10-CM

## 2022-01-11 DIAGNOSIS — O26.879 SHORT CERVIX AFFECTING PREGNANCY: ICD-10-CM

## 2022-01-11 PROCEDURE — 99999 PR PBB SHADOW E&M-EST. PATIENT-LVL II: ICD-10-PCS | Mod: PBBFAC,,, | Performed by: OBSTETRICS & GYNECOLOGY

## 2022-01-11 PROCEDURE — 0502F PR SUBSEQUENT PRENATAL CARE: ICD-10-PCS | Mod: ,,, | Performed by: OBSTETRICS & GYNECOLOGY

## 2022-01-11 PROCEDURE — 0502F SUBSEQUENT PRENATAL CARE: CPT | Mod: ,,, | Performed by: OBSTETRICS & GYNECOLOGY

## 2022-01-11 PROCEDURE — 99212 OFFICE O/P EST SF 10 MIN: CPT | Mod: PBBFAC | Performed by: OBSTETRICS & GYNECOLOGY

## 2022-01-11 PROCEDURE — 99999 PR PBB SHADOW E&M-EST. PATIENT-LVL II: CPT | Mod: PBBFAC,,, | Performed by: OBSTETRICS & GYNECOLOGY

## 2022-01-11 NOTE — PROGRESS NOTES
Reports good FM.  Denies bleeding and CTX.  Cx: closed.  Continue nightly Prometrium.  Prenatal massage today.  PT has significantly helped her pelvic / low back discomfort.  She requests PP BTL.  We discussed BTL as a permanent, irreversible method of sterilization with 1/200 risk of failure, increased risk of ectopics, and PTS.  We reviewed LBTL as well as Essure.  We also discussed all other methods of contraception: barrier, hormonal, IUD, vas, etc.  She wants to proceed with PP BTL.  [place]son tubal papers signed.  PNV and iron.  Return 2 weeks.

## 2022-01-13 ENCOUNTER — CLINICAL SUPPORT (OUTPATIENT)
Dept: REHABILITATION | Facility: OTHER | Age: 32
End: 2022-01-13
Payer: MEDICAID

## 2022-01-13 ENCOUNTER — PATIENT MESSAGE (OUTPATIENT)
Dept: ADMINISTRATIVE | Facility: OTHER | Age: 32
End: 2022-01-13
Payer: OTHER GOVERNMENT

## 2022-01-13 DIAGNOSIS — M25.559 PREGNANCY RELATED HIP PAIN, ANTEPARTUM: ICD-10-CM

## 2022-01-13 DIAGNOSIS — O26.899 PREGNANCY RELATED HIP PAIN, ANTEPARTUM: ICD-10-CM

## 2022-01-13 DIAGNOSIS — O26.899 PREGNANCY RELATED LOW BACK PAIN, ANTEPARTUM: ICD-10-CM

## 2022-01-13 DIAGNOSIS — M54.50 PREGNANCY RELATED LOW BACK PAIN, ANTEPARTUM: ICD-10-CM

## 2022-01-13 PROCEDURE — 97140 MANUAL THERAPY 1/> REGIONS: CPT | Mod: PN

## 2022-01-13 PROCEDURE — 97110 THERAPEUTIC EXERCISES: CPT | Mod: PN

## 2022-01-13 NOTE — PROGRESS NOTES
"[facility]   Physical Therapy Treatment Note     Name: Barbie Kumar  Clinic Number: [id number]    Therapy Diagnosis:   Encounter Diagnoses   Name Primary?    Pregnancy related low back pain, antepartum     Pregnancy related hip pain, antepartum      Physician: Milton Sanchez MD    Visit Date: 1/13/2022    Physician Orders: PT Eval and Treat   Medical Diagnosis from Referral: O26.892,M25.559 (ICD-10-CM) - Pregnancy related hip pain in second trimester, antepartum  Evaluation Date: 11/24/2021  Authorization Period Expiration: 9/28/2022  Plan of Care Expiration: 2/14/2022  Progress Note Due: 1/30/2022  Visit # / Visits authorized: 4/ 20   FOTO: #1 on 10/14/2021, 11/24/2021, 12/30/2021    PTA Visit #: 0/5     Precautions: Standard, gestation 20 weeks as of eval (ANTIONETTE is April 2022)     Time In: 1:00 pm  Time out: 2:05 pm  Total Billable Time: 65 minutes    SUBJECTIVE     Pt reports: She is feeling pretty good overall today. She ordered an ice pack and has been using it across her low back. She has been feeling a lot of pressure in her pelvic floor. She slept through the night last night after taking a warm bath.    She was compliant with home exercise program.  Response to previous treatment: no adverse response reported  Functional change: none    Pain: 5/10  Location: pelvic floor and lumbar spine    OBJECTIVE     Objective Measures updated at progress report unless specified.     Treatment     Barbie received the treatments listed below:      therapeutic exercises to develop strength, ROM, flexibility and core stabilization for 20 minutes including:  Hip adduction 2x10 5"  Hip abduction GTB 2x10  TRA contraction 2x10  diaphragmatic breathing 2 min  butterfly stretch 2 min  seated HS stretch 2x30"  seated piriformis stretch 2x30"  seated sciatic nerve glides 10xB    Not today:  Seated pelvic tilts 1 x 10  standing trunk flexion 1 x 10  Qped laxmi pose stretch (knee position to " "tolerance) 3 x 20"  Qped tail wags 1 x 10  Qped cat/cow 1 x 10 - sequence with exhalation    manual therapy techniques: Soft tissue Mobilization were applied to the: lumbar spine and glutes for 45 minutes, including:  STM to lumbar paraspinals with pt positioned in prone in pregnancy pillow  Foam rolling glutes to alleviate tension surrounding sciatic nerve    Modalities: 15 min x ice to low back and glutes, performed simultaneous with supine therex      Patient Education and Home Exercises     Home Exercises Provided and Patient Education Provided     Education provided:   - ariel Adkins MET at home for pubic symphysis pain    Written Home Exercises Provided: yes. Exercises were reviewed and Barbie was able to demonstrate them prior to the end of the session.  Barbie demonstrated good  understanding of the education provided. See EMR under Patient Instructions for exercises provided during therapy sessions    ASSESSMENT     Pt continues to have increased tone in lumbar paraspinals secondary to poor core activation with ADLs. Pt experienced some pain in her L glute during hip abduction that alleviated when she stopped the exercise. Pt verbalized a decrease in lumbar and glute pain at the end of the session. Pt will continue to benefit from skilled PT focused on improving core activation and proximal hip strength and decreasing myofascial restriction surrounding lumbar spine and sciatic nerves so that she can stand for more than 30 minutes without an increase in pain    Barbie is progressing well towards her goals.   Pt prognosis is Good.     Pt will continue to benefit from skilled outpatient physical therapy to address the deficits listed in the problem list box on initial evaluation, provide pt/family education and to maximize pt's level of independence in the home and community environment.     Pt's spiritual, cultural and educational needs considered and pt agreeable to plan of care and goals.   "   Anticipated barriers to physical therapy: standard, pregnancy, works full time, also in school for bachelor's degree    Goals:   Short Term Goals (6 Weeks):  1. Pt able to stand >=30 minutes with ADLs with <4/10 pain. (progressing, not met)  2. Pt able to walk >=30 minutes with household chores with <4/10 pain. (progressing, not met)  3.  Pt able to transfer in/out of chairs of various heights with <4/10 pain. (goal met 12/30/2021)  4. Pt to demonstrate improved functional ability with FOTO limitation <=68% disability. (progressing, not met)     Long Term Goals (12 Weeks):  1. Pt able to stand >=1 hour with with ADLs with <3/10 pain. (progressing, not met)  2. Pt able to walk >=1 hour with household chores with <3/10 pain. (progressing, not met)  3.  Pt able to squat/lift 5# from floor to waist with <3/10 pain. (progressing, not met)  4. Pt able to ascend/descend 1 flight of stairs, independently, 1 handrail, with <3/10 pain. (progressing, not met)  5. Pt able to sit >= 1 hour with CPU work with <3/10 pain. (progressing, not met)  6. Pt is independent with all bed mobility. (progressing, not met)  7. Pt will be independent with HEP and self management of symptoms. (progressing, not met)  8. Pt to demonstrate improved functional ability with FOTO limitation <=58% disability. (progressing, not met)    PLAN     Next visit: continue with manual therapy PRN, check that pt is myron TRA correctly, progress core and hip strengthening as tolerated    Kristin Vidales, PT

## 2022-01-19 ENCOUNTER — CLINICAL SUPPORT (OUTPATIENT)
Dept: REHABILITATION | Facility: OTHER | Age: 32
End: 2022-01-19
Payer: MEDICAID

## 2022-01-19 DIAGNOSIS — O26.899 PREGNANCY RELATED HIP PAIN, ANTEPARTUM: ICD-10-CM

## 2022-01-19 DIAGNOSIS — M25.559 PREGNANCY RELATED HIP PAIN, ANTEPARTUM: ICD-10-CM

## 2022-01-19 DIAGNOSIS — O26.899 PREGNANCY RELATED LOW BACK PAIN, ANTEPARTUM: ICD-10-CM

## 2022-01-19 DIAGNOSIS — M54.50 PREGNANCY RELATED LOW BACK PAIN, ANTEPARTUM: ICD-10-CM

## 2022-01-19 PROCEDURE — 97110 THERAPEUTIC EXERCISES: CPT | Mod: PN

## 2022-01-19 PROCEDURE — 97140 MANUAL THERAPY 1/> REGIONS: CPT | Mod: PN

## 2022-01-19 NOTE — PROGRESS NOTES
"[facility]   Physical Therapy Treatment Note     Name: Barbie Kumar  Clinic Number: [id number]    Therapy Diagnosis:   Encounter Diagnoses   Name Primary?    Pregnancy related low back pain, antepartum     Pregnancy related hip pain, antepartum      Physician: Milton Sanchez MD    Visit Date: 1/19/2022    Physician Orders: PT Eval and Treat   Medical Diagnosis from Referral: O26.892,M25.559 (ICD-10-CM) - Pregnancy related hip pain in second trimester, antepartum  Evaluation Date: 11/24/2021  Authorization Period Expiration: 9/28/2022  Plan of Care Expiration: 2/14/2022  Progress Note Due: 1/30/2022  Visit # / Visits authorized: 3/ 20 (6 total visits)   FOTO: #1 on 10/14/2021, 11/24/2021, 12/30/2021, 1/19/2022    PTA Visit #: 0/5     Precautions: Standard, gestation 20 weeks as of eval (ANTIONETTE is April 2022)     Time In: 11:00 am  Time out: 12:00 pm  Total Billable Time: 60 minutes    SUBJECTIVE     Pt reports: her hips are okay. Her back is getting better. She slept pretty good last night and did not use her pregnancy pillow. She has decreased energy. She is having more good days than bad days. She felt so good after last session she was able to clean 3 rooms in her house. She soaked in the tub after.    She was compliant with home exercise program.  Response to previous treatment: no adverse response reported  Functional change: none    Pain: 2/10  Location: pelvic floor and lumbar spine    OBJECTIVE         CMS Impairment/Limitation/Restriction for FOTO Hip Survey    Therapist reviewed FOTO scores for Barbie Kumar on 1/19/2022.   FOTO documents entered into [employer] - see Media section.    Evaluation: 78%    Current : 46%    Goal: 58%       Treatment     Barbie received the treatments listed below:      therapeutic exercises to develop strength, ROM, flexibility and core stabilization for 30 minutes including:  Hip adduction 2x10 5"  Hip abduction GTB 2x10  TRA contraction " "2x10  diaphragmatic breathing 2 min  butterfly stretch 2 min  seated HS stretch 2x30"  seated piriformis stretch 2x30"  seated sciatic nerve glides 10xB    Pt given handout on perineal massage and educated how to perform 4-6 weeks prior to birth to decrease risk of tearing.    Not today:  Seated pelvic tilts 1 x 10  standing trunk flexion 1 x 10  Qped laxmi pose stretch (knee position to tolerance) 3 x 20"  Qped tail wags 1 x 10  Qped cat/cow 1 x 10 - sequence with exhalation    manual therapy techniques: Soft tissue Mobilization were applied to the: lumbar spine and glutes for 30 minutes, including:  STM to lumbar paraspinals with pt positioned in prone in pregnancy pillow  Foam rolling glutes to alleviate tension surrounding sciatic nerve    Modalities: 15 min x ice to low back and glutes, performed simultaneous with supine therex      Patient Education and Home Exercises     Home Exercises Provided and Patient Education Provided     Education provided:   - perineal massage    Written Home Exercises Provided: yes. Exercises were reviewed and Barbie was able to demonstrate them prior to the end of the session.  Barbie demonstrated good  understanding of the education provided. See EMR under Patient Instructions for exercises provided during therapy sessions    ASSESSMENT     Pt demonstrated a decrease in her hip FOTO survey limitation score demonstrating an improvement in functional ability with less pain. Pt continues to have increased tone in L lumbar paraspinals that is alleviated with manual therapy. Pt able to perform exercises without an increase in pain and would benefit from a progression in resistance next session. Pt verbalized a decrease in pain at the end of the session. The effects of physical therapy sessions are lasting longer demonstrating an increase in pt's strength and posture. Pt will continue to benefit from skilled PT focused on improving core activation and proximal hip strength and " decreasing myofascial restriction surrounding lumbar spine and sciatic nerves so that she can stand for more than 30 minutes without an increase in pain    Barbie is progressing well towards her goals.   Pt prognosis is Good.     Pt will continue to benefit from skilled outpatient physical therapy to address the deficits listed in the problem list box on initial evaluation, provide pt/family education and to maximize pt's level of independence in the home and community environment.     Pt's spiritual, cultural and educational needs considered and pt agreeable to plan of care and goals.     Anticipated barriers to physical therapy: standard, pregnancy, works full time, also in school for bachelor's degree    Goals:   Short Term Goals (6 Weeks):  1. Pt able to stand >=30 minutes with ADLs with <4/10 pain. (progressing, not met)  2. Pt able to walk >=30 minutes with household chores with <4/10 pain. (progressing, not met)  3.  Pt able to transfer in/out of chairs of various heights with <4/10 pain. (goal met 12/30/2021)  4. Pt to demonstrate improved functional ability with FOTO limitation <=68% disability. (goal met 1/19/2022)     Long Term Goals (12 Weeks):  1. Pt able to stand >=1 hour with with ADLs with <3/10 pain. (progressing, not met)  2. Pt able to walk >=1 hour with household chores with <3/10 pain. (progressing, not met)  3.  Pt able to squat/lift 5# from floor to waist with <3/10 pain. (progressing, not met)  4. Pt able to ascend/descend 1 flight of stairs, independently, 1 handrail, with <3/10 pain. (progressing, not met)  5. Pt able to sit >= 1 hour with CPU work with <3/10 pain. (progressing, not met)  6. Pt is independent with all bed mobility. (progressing, not met)  7. Pt will be independent with HEP and self management of symptoms. (progressing, not met)  8. Pt to demonstrate improved functional ability with FOTO limitation <=58% disability. (goal met 1/19/2022)    PLAN     Next visit: continue with  manual therapy PRN, progress resistance for hip abduction    Kristin Vidales, PT

## 2022-01-19 NOTE — PATIENT INSTRUCTIONS
"Home Exercise Program: 01/19/2022    PERINEAL MASSAGE    1. Wash hands thoroughly with antibacterial soap.  2. Lay down comfortably with your back and head well supported by several pillows.  3. Apply water-based lubricant (ie. Slippery stuff, coconut oil, olive oil) on your thumbs and perineum.  4. Place one or both thumbs just inside the vagina.  5. Gently press downward, then slowly continue the stretch up both sides of the vaginal opening.    -perform "U" shaped swiping with a small stretch   -use thumb and index finger to pinch and roll perineal skin  6. The amount of pressure for the stretch may cause discomfort, but not pain (ie. You can replicate the stretching sensation by bending your finger backward). Don't go above 4-5/10 pain during or after the exericse.  7. Focus on relaxed breathing and keeping the pelvic floor muscles dropped.   8. Continue for 5-10 minutes, 3-4 times per week.    STOP if there is increased bleeding, an infection, or extreme pain.         Some may prefer their partner to assist them or to perform the massage for them. Your partner needs to use clean hands and gently insert one or two index fingers inside the lower part of the vagina and follow the steps listed above. It is important to tell your partner how much pressure to apply without causing pain.   "

## 2022-01-24 ENCOUNTER — PATIENT MESSAGE (OUTPATIENT)
Dept: OBSTETRICS AND GYNECOLOGY | Facility: CLINIC | Age: 32
End: 2022-01-24
Payer: OTHER GOVERNMENT

## 2022-01-24 ENCOUNTER — LAB VISIT (OUTPATIENT)
Dept: LAB | Facility: HOSPITAL | Age: 32
End: 2022-01-24
Attending: OBSTETRICS & GYNECOLOGY
Payer: OTHER GOVERNMENT

## 2022-01-24 VITALS — SYSTOLIC BLOOD PRESSURE: 110 MMHG | WEIGHT: 131.63 LBS | DIASTOLIC BLOOD PRESSURE: 74 MMHG | BODY MASS INDEX: 25.7 KG/M2

## 2022-01-24 DIAGNOSIS — Z34.80 SUPERVISION OF OTHER NORMAL PREGNANCY, ANTEPARTUM: Primary | ICD-10-CM

## 2022-01-24 DIAGNOSIS — L29.9 ITCHING: ICD-10-CM

## 2022-01-24 DIAGNOSIS — Z3A.29 PREGNANCY WITH 29 COMPLETED WEEKS GESTATION: ICD-10-CM

## 2022-01-24 DIAGNOSIS — O26.879 SHORT CERVIX AFFECTING PREGNANCY: ICD-10-CM

## 2022-01-24 LAB
ALBUMIN SERPL BCP-MCNC: 3.1 G/DL (ref 3.5–5.2)
ALP SERPL-CCNC: 67 U/L (ref 55–135)
ALT SERPL W/O P-5'-P-CCNC: 11 U/L (ref 10–44)
ANION GAP SERPL CALC-SCNC: 8 MMOL/L (ref 8–16)
AST SERPL-CCNC: 11 U/L (ref 10–40)
BILIRUB SERPL-MCNC: 0.3 MG/DL (ref 0.1–1)
BUN SERPL-MCNC: 6 MG/DL (ref 6–20)
CALCIUM SERPL-MCNC: 8.9 MG/DL (ref 8.7–10.5)
CHLORIDE SERPL-SCNC: 103 MMOL/L (ref 95–110)
CO2 SERPL-SCNC: 23 MMOL/L (ref 23–29)
CREAT SERPL-MCNC: 0.6 MG/DL (ref 0.5–1.4)
EST. GFR  (AFRICAN AMERICAN): >60 ML/MIN/1.73 M^2
EST. GFR  (NON AFRICAN AMERICAN): >60 ML/MIN/1.73 M^2
GLUCOSE SERPL-MCNC: 102 MG/DL (ref 70–110)
POTASSIUM SERPL-SCNC: 3.7 MMOL/L (ref 3.5–5.1)
PROT SERPL-MCNC: 6.8 G/DL (ref 6–8.4)
SODIUM SERPL-SCNC: 134 MMOL/L (ref 136–145)

## 2022-01-24 PROCEDURE — 0502F SUBSEQUENT PRENATAL CARE: CPT | Mod: ,,, | Performed by: OBSTETRICS & GYNECOLOGY

## 2022-01-24 PROCEDURE — 0502F PR SUBSEQUENT PRENATAL CARE: ICD-10-PCS | Mod: ,,, | Performed by: OBSTETRICS & GYNECOLOGY

## 2022-01-24 PROCEDURE — 99999 PR PBB SHADOW E&M-EST. PATIENT-LVL II: ICD-10-PCS | Mod: PBBFAC,,, | Performed by: OBSTETRICS & GYNECOLOGY

## 2022-01-24 PROCEDURE — 82239 BILE ACIDS TOTAL: CPT | Performed by: OBSTETRICS & GYNECOLOGY

## 2022-01-24 PROCEDURE — 80053 COMPREHEN METABOLIC PANEL: CPT | Performed by: OBSTETRICS & GYNECOLOGY

## 2022-01-24 PROCEDURE — 99212 OFFICE O/P EST SF 10 MIN: CPT | Mod: PBBFAC,PN | Performed by: OBSTETRICS & GYNECOLOGY

## 2022-01-24 PROCEDURE — 99999 PR PBB SHADOW E&M-EST. PATIENT-LVL II: CPT | Mod: PBBFAC,,, | Performed by: OBSTETRICS & GYNECOLOGY

## 2022-01-24 NOTE — PROGRESS NOTES
Reports good FM.  Denies bleeding and cramping but feels occasional pelvic pressure.  Cx: closed / posterior.  To continue nightly progesterone.  Urine dip with 2+ glucose: reports coke prior to visit.  Describes itching on abdomen for the past 3 days- no rash.  Check bile acids and LFTs.  She feels that PT is continuing to help with low back / pelvic discomfort.  Return 2 weeks.

## 2022-01-25 ENCOUNTER — TELEPHONE (OUTPATIENT)
Dept: OBSTETRICS AND GYNECOLOGY | Facility: CLINIC | Age: 32
End: 2022-01-25
Payer: OTHER GOVERNMENT

## 2022-01-25 ENCOUNTER — CLINICAL SUPPORT (OUTPATIENT)
Dept: REHABILITATION | Facility: OTHER | Age: 32
End: 2022-01-25
Attending: OBSTETRICS & GYNECOLOGY
Payer: MEDICAID

## 2022-01-25 DIAGNOSIS — M54.50 PREGNANCY RELATED LOW BACK PAIN, ANTEPARTUM: ICD-10-CM

## 2022-01-25 DIAGNOSIS — O26.899 PREGNANCY RELATED LOW BACK PAIN, ANTEPARTUM: ICD-10-CM

## 2022-01-25 DIAGNOSIS — M25.559 PREGNANCY RELATED HIP PAIN, ANTEPARTUM: ICD-10-CM

## 2022-01-25 DIAGNOSIS — O26.899 PREGNANCY RELATED HIP PAIN, ANTEPARTUM: ICD-10-CM

## 2022-01-25 LAB — BILE AC SERPL-SCNC: 4 MCMOL/L

## 2022-01-25 PROCEDURE — 97110 THERAPEUTIC EXERCISES: CPT | Mod: PN

## 2022-01-25 PROCEDURE — 97140 MANUAL THERAPY 1/> REGIONS: CPT | Mod: PN

## 2022-01-25 NOTE — PLAN OF CARE
[facility]   Physical Therapy Reassessment and Treatment Note     Name: Barbie Kumar  Clinic Number: [id number]    Therapy Diagnosis:   Encounter Diagnoses   Name Primary?    Pregnancy related low back pain, antepartum     Pregnancy related hip pain, antepartum      Physician: Milton Sanchez MD    Visit Date: 1/25/2022    Physician Orders: PT Eval and Treat   Medical Diagnosis from Referral: O26.892,M25.559 (ICD-10-CM) - Pregnancy related hip pain in second trimester, antepartum  Evaluation Date: 11/24/2021  Authorization Period Expiration: 9/28/2022  Plan of Care Expiration: 2/14/2022  Progress Note Due: 1/30/2022  Visit # / Visits authorized: 4/ 20 (7 total visits)   FOTO: #1 on 10/14/2021, 11/24/2021, 12/30/2021, 1/19/2022    PTA Visit #: 0/5     Precautions: Standard, gestation 20 weeks as of eval (ANTIONETTE is April 2022)     Time In: 9:03 am  Time out: 12:00 pm  Total Billable Time: 60 minutes    SUBJECTIVE     Pt reports: she went to OB appointment yesterday. She states that they may have to take the baby early. She will hopefully get the test results back soon. She was diagnosed with Cholestasis. She hasn't been sleeping well due to the itching. Her back started hurting. She felt good after last session.    She was compliant with home exercise program.  Response to previous treatment: no adverse response reported  Functional change: none    Pain: 7/10  Location: low back and bilateral glutes    OBJECTIVE   1/25/2022  Lower Extremity Strength  Right LE   Left LE     Hip flexion: 4+/5 Hip flexion: 4+/5   Hip External rotation:  3+/5 Hip External rotation: 3+/5   Hip Internal rotation    3+/5 Hip Internal rotation 3+/5   Knee Flexion 5/5 Knee Flexion 5/5   Knee Extension 5/5 Knee Extension 5/5     Treatment     Barbie received the treatments listed below:      therapeutic exercises to develop strength, ROM, flexibility and core stabilization for 30 minutes including:  Hip adduction  "2x10 5"  Hip abduction GTB 2x10  TRA contraction x10  diaphragmatic breathing 2 min  butterfly stretch 2 min  seated HS stretch 2x30"  seated piriformis stretch 2x30"  seated sciatic nerve glides 10xB    Not today:  Seated pelvic tilts 1 x 10  standing trunk flexion 1 x 10  Qped laxmi pose stretch (knee position to tolerance) 3 x 20"  Qped tail wags 1 x 10  Qped cat/cow 1 x 10 - sequence with exhalation    manual therapy techniques: Soft tissue Mobilization were applied to the: lumbar spine and glutes for 30 minutes, including:  STM to lumbar paraspinals with pt positioned in prone in pregnancy pillow  Foam rolling glutes to alleviate tension surrounding sciatic nerve    Modalities: 15 min x heat to low back and glutes, performed simultaneous with supine therex      Patient Education and Home Exercises     Home Exercises Provided and Patient Education Provided     Education provided:   - no new education provided today    Written Home Exercises Provided: Patient instructed to cont prior HEP. Exercises were reviewed and Barbie was able to demonstrate them prior to the end of the session.  Barbie demonstrated good  understanding of the education provided. See EMR under Patient Instructions for exercises provided during therapy sessions    ASSESSMENT     Pt verbalized a decrease in pain to 5/10 in low back at end of session. Pt had less tension present in L paraspinals compared to last session. Pt demonstrated an increase in proximal hip strength compared to objective measures taken at last reassessment. Pt continues to have increased lordosis with standing posture which is likely contributing to her lumbar pain. Pt will continue to benefit from skilled PT focused on improving core activation and proximal hip strength and decreasing myofascial restriction surrounding lumbar spine and sciatic nerves so that she can stand for more than 30 minutes without an increase in pain.    Barbie is progressing well towards her " goals.   Pt prognosis is Good.     Pt will continue to benefit from skilled outpatient physical therapy to address the deficits listed in the problem list box on initial evaluation, provide pt/family education and to maximize pt's level of independence in the home and community environment.     Pt's spiritual, cultural and educational needs considered and pt agreeable to plan of care and goals.     Anticipated barriers to physical therapy: standard, pregnancy, works full time, also in school for bachelor's degree    Goals:   Short Term Goals (6 Weeks):  1. Pt able to stand >=30 minutes with ADLs with <4/10 pain. (progressing, not met)  2. Pt able to walk >=30 minutes with household chores with <4/10 pain. (progressing, not met)  3.  Pt able to transfer in/out of chairs of various heights with <4/10 pain. (goal met 12/30/2021)  4. Pt to demonstrate improved functional ability with FOTO limitation <=68% disability. (goal met 1/19/2022)     Long Term Goals (12 Weeks):  1. Pt able to stand >=1 hour with with ADLs with <3/10 pain. (progressing, not met)  2. Pt able to walk >=1 hour with household chores with <3/10 pain. (progressing, not met)  3.  Pt able to squat/lift 5# from floor to waist with <3/10 pain. (progressing, not met)  4. Pt able to ascend/descend 1 flight of stairs, independently, 1 handrail, with <3/10 pain. (progressing, not met)  5. Pt able to sit >= 1 hour with CPU work with <3/10 pain. (progressing, not met)  6. Pt is independent with all bed mobility. (goal met 1/25/2022)  7. Pt will be independent with HEP and self management of symptoms. (progressing, not met)  8. Pt to demonstrate improved functional ability with FOTO limitation <=58% disability. (goal met 1/19/2022)    PLAN     Next visit: continue with manual therapy PRN, progress resistance for hip abduction    Kristin Vidales, PT

## 2022-01-25 NOTE — TELEPHONE ENCOUNTER
Called patient:    Discussed results of labs:    Bile acids: 4,  LFTs normal    No evidence of cholestasis of pregnancy.    Still reports having some itching - to try Benadryl cream.

## 2022-01-26 ENCOUNTER — CLINICAL SUPPORT (OUTPATIENT)
Dept: REHABILITATION | Facility: OTHER | Age: 32
End: 2022-01-26
Payer: MEDICAID

## 2022-01-26 DIAGNOSIS — M54.50 PREGNANCY RELATED LOW BACK PAIN, ANTEPARTUM: ICD-10-CM

## 2022-01-26 DIAGNOSIS — O26.899 PREGNANCY RELATED LOW BACK PAIN, ANTEPARTUM: ICD-10-CM

## 2022-01-26 DIAGNOSIS — O26.899 PREGNANCY RELATED HIP PAIN, ANTEPARTUM: Primary | ICD-10-CM

## 2022-01-26 DIAGNOSIS — M25.559 PREGNANCY RELATED HIP PAIN, ANTEPARTUM: Primary | ICD-10-CM

## 2022-01-26 PROCEDURE — 97140 MANUAL THERAPY 1/> REGIONS: CPT | Mod: PN

## 2022-01-26 PROCEDURE — 97110 THERAPEUTIC EXERCISES: CPT | Mod: PN

## 2022-01-26 NOTE — PROGRESS NOTES
"[facility]   Physical Therapy Treatment Note     Name: Barbie Kumar  Clinic Number: [id number]    Therapy Diagnosis:   Encounter Diagnoses   Name Primary?    Pregnancy related hip pain, antepartum Yes    Pregnancy related low back pain, antepartum      Physician: Milton Sanchez MD    Visit Date: 1/26/2022    Physician Orders: PT Eval and Treat   Medical Diagnosis from Referral: O26.892,M25.559 (ICD-10-CM) - Pregnancy related hip pain in second trimester, antepartum  Evaluation Date: 11/24/2021  Authorization Period Expiration: 9/28/2022  Plan of Care Expiration: 2/14/2022  Progress Note Due: 2/25/2022  Visit # / Visits authorized: 4/ 20 (7 total visits)   FOTO: #1 on 10/14/2021, 11/24/2021, 12/30/2021, 1/19/2022    PTA Visit #: 0/5     Precautions: Standard, gestation 20 weeks as of eval (ANTIONETTE is April 2022)     Time In: 12:15 pm  Time out: 1:15 pm  Total Billable Time: 60 minutes    SUBJECTIVE     Pt reports: she says that her blood test came back negative for gallbladder dysfunction and her baby is safe. She expresses being very happy with the results and was able to have a better night's sleep last night. She says that her back continues to ache but overall she is feeling good and finds PT very helpful.   She was compliant with home exercise program.  Response to previous treatment: no adverse response reported  Functional change: none    Pain: 2/10  Location: pelvic floor and lumbar spine    OBJECTIVE     Updated 1/25/2022    Treatment     Barbie received the treatments listed below:      therapeutic exercises to develop strength, ROM, flexibility and core stabilization for 30 minutes including:  Hip adduction 2x10 5"  Hip abduction GTB 2x10  TRA contraction 2x10  diaphragmatic breathing 2 min  butterfly stretch 2 min  seated HS stretch 2x30"  seated piriformis stretch 2x30"  seated sciatic nerve glides 10xB    Pt given handout on perineal massage and educated how to perform " "4-6 weeks prior to birth to decrease risk of tearing.    Not today:  Seated pelvic tilts 1 x 10  standing trunk flexion 1 x 10  Qped laxmi pose stretch (knee position to tolerance) 3 x 20"  Qped tail wags 1 x 10  Qped cat/cow 1 x 10 - sequence with exhalation    manual therapy techniques: Soft tissue Mobilization were applied to the: lumbar spine and glutes for 30 minutes, including:  STM to lumbar paraspinals with pt positioned in prone in pregnancy pillow  Foam rolling glutes to alleviate tension surrounding sciatic nerve    Modalities: 15 min x MHP to low back and glutes, performed simultaneous with supine therex      Patient Education and Home Exercises     Home Exercises Provided and Patient Education Provided     Education provided:   - perineal massage    Written Home Exercises Provided: yes. Exercises were reviewed and Barbie was able to demonstrate them prior to the end of the session.  Barbie demonstrated good  understanding of the education provided. See EMR under Patient Instructions for exercises provided during therapy sessions    ASSESSMENT     Attempted cupping with manual therapy today. Pt presents with marked improvement in MFM after manual therapy today. Pt notes good pain reduction by end of session. Plan to progress strengthening next visit per pt tolerance.    Barbie is progressing well towards her goals.   Pt prognosis is Good.     Pt will continue to benefit from skilled outpatient physical therapy to address the deficits listed in the problem list box on initial evaluation, provide pt/family education and to maximize pt's level of independence in the home and community environment.     Pt's spiritual, cultural and educational needs considered and pt agreeable to plan of care and goals.     Anticipated barriers to physical therapy: standard, pregnancy, works full time, also in school for bachelor's degree    Goals:   Short Term Goals (6 Weeks):  1. Pt able to stand >=30 minutes with ADLs " with <4/10 pain. (progressing, not met)  2. Pt able to walk >=30 minutes with household chores with <4/10 pain. (progressing, not met)  3.  Pt able to transfer in/out of chairs of various heights with <4/10 pain. (goal met 12/30/2021)  4. Pt to demonstrate improved functional ability with FOTO limitation <=68% disability. (goal met 1/19/2022)     Long Term Goals (12 Weeks):  1. Pt able to stand >=1 hour with with ADLs with <3/10 pain. (progressing, not met)  2. Pt able to walk >=1 hour with household chores with <3/10 pain. (progressing, not met)  3.  Pt able to squat/lift 5# from floor to waist with <3/10 pain. (progressing, not met)  4. Pt able to ascend/descend 1 flight of stairs, independently, 1 handrail, with <3/10 pain. (progressing, not met)  5. Pt able to sit >= 1 hour with CPU work with <3/10 pain. (progressing, not met)  6. Pt is independent with all bed mobility. (progressing, not met)  7. Pt will be independent with HEP and self management of symptoms. (progressing, not met)  8. Pt to demonstrate improved functional ability with FOTO limitation <=58% disability. (goal met 1/19/2022)    PLAN     Next visit: continue with manual therapy PRN, progress resistance for hip abduction    Isabella Cochran, PT

## 2022-01-27 ENCOUNTER — ROUTINE PRENATAL (OUTPATIENT)
Dept: OBSTETRICS AND GYNECOLOGY | Facility: CLINIC | Age: 32
End: 2022-01-27
Attending: OBSTETRICS & GYNECOLOGY
Payer: OTHER GOVERNMENT

## 2022-01-27 VITALS
WEIGHT: 132.06 LBS | SYSTOLIC BLOOD PRESSURE: 100 MMHG | BODY MASS INDEX: 25.79 KG/M2 | DIASTOLIC BLOOD PRESSURE: 70 MMHG

## 2022-01-27 DIAGNOSIS — Z3A.30 PREGNANCY WITH 30 COMPLETED WEEKS GESTATION: ICD-10-CM

## 2022-01-27 DIAGNOSIS — Z34.80 SUPERVISION OF OTHER NORMAL PREGNANCY, ANTEPARTUM: Primary | ICD-10-CM

## 2022-01-27 DIAGNOSIS — O26.879 SHORT CERVIX AFFECTING PREGNANCY: ICD-10-CM

## 2022-01-27 PROCEDURE — 0502F SUBSEQUENT PRENATAL CARE: CPT | Mod: ,,, | Performed by: OBSTETRICS & GYNECOLOGY

## 2022-01-27 PROCEDURE — 99999 PR PBB SHADOW E&M-EST. PATIENT-LVL II: CPT | Mod: PBBFAC,,, | Performed by: OBSTETRICS & GYNECOLOGY

## 2022-01-27 PROCEDURE — 99212 OFFICE O/P EST SF 10 MIN: CPT | Mod: PBBFAC | Performed by: OBSTETRICS & GYNECOLOGY

## 2022-01-27 PROCEDURE — 0502F PR SUBSEQUENT PRENATAL CARE: ICD-10-PCS | Mod: ,,, | Performed by: OBSTETRICS & GYNECOLOGY

## 2022-01-27 PROCEDURE — 99999 PR PBB SHADOW E&M-EST. PATIENT-LVL II: ICD-10-PCS | Mod: PBBFAC,,, | Performed by: OBSTETRICS & GYNECOLOGY

## 2022-01-27 NOTE — PROGRESS NOTES
Reports feeling better today.  Good FM.  Denies bleeding and CTX. With change in detergent, her itching is tremendously less.  Unable to provide urine sample today.  She finds that PT is helping.  Return 2 weeks.

## 2022-02-02 ENCOUNTER — CLINICAL SUPPORT (OUTPATIENT)
Dept: REHABILITATION | Facility: OTHER | Age: 32
End: 2022-02-02
Payer: MEDICAID

## 2022-02-02 DIAGNOSIS — O26.899 PREGNANCY RELATED LOW BACK PAIN, ANTEPARTUM: ICD-10-CM

## 2022-02-02 DIAGNOSIS — M54.50 PREGNANCY RELATED LOW BACK PAIN, ANTEPARTUM: ICD-10-CM

## 2022-02-02 DIAGNOSIS — O26.899 PREGNANCY RELATED HIP PAIN, ANTEPARTUM: ICD-10-CM

## 2022-02-02 DIAGNOSIS — M25.559 PREGNANCY RELATED HIP PAIN, ANTEPARTUM: ICD-10-CM

## 2022-02-02 PROCEDURE — 97110 THERAPEUTIC EXERCISES: CPT | Mod: PN

## 2022-02-02 PROCEDURE — 97140 MANUAL THERAPY 1/> REGIONS: CPT | Mod: PN

## 2022-02-02 NOTE — PROGRESS NOTES
"[facility]   Physical Therapy Treatment Note     Name: Barbie Kumar  Clinic Number: [id number]    Therapy Diagnosis:   Encounter Diagnoses   Name Primary?    Pregnancy related low back pain, antepartum     Pregnancy related hip pain, antepartum      Physician: Milton Sanchez MD    Visit Date: 2/2/2022    Physician Orders: PT Eval and Treat   Medical Diagnosis from Referral: O26.892,M25.559 (ICD-10-CM) - Pregnancy related hip pain in second trimester, antepartum  Evaluation Date: 11/24/2021  Authorization Period Expiration: 9/28/2022  Plan of Care Expiration: 2/14/2022  Progress Note Due: 2/25/2022  Visit # / Visits authorized: 5/ 20 (8 total visits)   FOTO: #1 on 10/14/2021, 11/24/2021, 12/30/2021, 1/19/2022    PTA Visit #: 0/5     Precautions: Standard, gestation 20 weeks as of eval (ANTIONETTE is April 2022)     Time In: 10:45 am  Time out: 11:45 am  Total Billable Time: 60 minutes    SUBJECTIVE     Pt reports: she says that her blood test came back negative for gallbladder dysfunction and her baby is safe. She expresses being very happy with the results and was able to have a better night's sleep last night. She says that her back continues to ache but overall she is feeling good and finds PT very helpful.   She was compliant with home exercise program.  Response to previous treatment: no adverse response reported  Functional change: none    Pain: 2/10  Location: pelvic floor and lumbar spine    OBJECTIVE     Updated 1/25/2022    Treatment     Barbie received the treatments listed below:      therapeutic exercises to develop strength, ROM, flexibility and core stabilization for 30 minutes including:  Hip adduction 2x10 5"  Hip abduction GTB 2x10  TRA contraction 2x10  diaphragmatic breathing 2 min  butterfly stretch 2 min  seated HS stretch 2x30"  seated piriformis stretch 2x30"  seated sciatic nerve glides 10xB    Pt given handout on perineal massage and educated how to perform 4-6 " "weeks prior to birth to decrease risk of tearing.    Not today:  Seated pelvic tilts 1 x 10  standing trunk flexion 1 x 10  Qped laxmi pose stretch (knee position to tolerance) 3 x 20"  Qped tail wags 1 x 10  Qped cat/cow 1 x 10 - sequence with exhalation    manual therapy techniques: Soft tissue Mobilization were applied to the: lumbar spine and glutes for 30 minutes, including:  STM to lumbar paraspinals with pt positioned in prone in pregnancy pillow  Foam rolling glutes to alleviate tension surrounding sciatic nerve  10 min x vacuum/cupping STM with manual therapy techniques was performed to Cedric lumbar spine to decrease muscle tightness, increase circulation and promote healing process. The pt's skin was monitored for redness adjusting pressure as needed. The pt was instructed in possible side effects of bruising and/or soreness. Pt verbalized understanding.    Modalities: 15 min x MHP to low back and glutes, performed simultaneous with supine therex      Patient Education and Home Exercises     Home Exercises Provided and Patient Education Provided     Education provided:   - perineal massage    Written Home Exercises Provided: yes. Exercises were reviewed and Barbie was able to demonstrate them prior to the end of the session.  Barbie demonstrated good  understanding of the education provided. See EMR under Patient Instructions for exercises provided during therapy sessions    ASSESSMENT     Pt presents with flare up of pain due to recent stress at home and pregnancy scare. Good tolerance with manual therapy today with reduced pain and improved mobility. Pt verbalized increased fetal movement throughout session, increasing c/o LBP. Edu on importance of breathing for stress relief as stress increases tension throughout the body and has the potential for exacerbating pain. Pt verbalized understanding and whit's good return technique.    Barbie is progressing well towards her goals.   Pt prognosis is Good. "     Pt will continue to benefit from skilled outpatient physical therapy to address the deficits listed in the problem list box on initial evaluation, provide pt/family education and to maximize pt's level of independence in the home and community environment.     Pt's spiritual, cultural and educational needs considered and pt agreeable to plan of care and goals.     Anticipated barriers to physical therapy: standard, pregnancy, works full time, also in school for bachelor's degree    Goals:   Short Term Goals (6 Weeks):  1. Pt able to stand >=30 minutes with ADLs with <4/10 pain. (progressing, not met)  2. Pt able to walk >=30 minutes with household chores with <4/10 pain. (progressing, not met)  3.  Pt able to transfer in/out of chairs of various heights with <4/10 pain. (goal met 12/30/2021)  4. Pt to demonstrate improved functional ability with FOTO limitation <=68% disability. (goal met 1/19/2022)     Long Term Goals (12 Weeks):  1. Pt able to stand >=1 hour with with ADLs with <3/10 pain. (progressing, not met)  2. Pt able to walk >=1 hour with household chores with <3/10 pain. (progressing, not met)  3.  Pt able to squat/lift 5# from floor to waist with <3/10 pain. (progressing, not met)  4. Pt able to ascend/descend 1 flight of stairs, independently, 1 handrail, with <3/10 pain. (progressing, not met)  5. Pt able to sit >= 1 hour with CPU work with <3/10 pain. (progressing, not met)  6. Pt is independent with all bed mobility. (progressing, not met)  7. Pt will be independent with HEP and self management of symptoms. (progressing, not met)  8. Pt to demonstrate improved functional ability with FOTO limitation <=58% disability. (goal met 1/19/2022)    PLAN     Next visit: continue with manual therapy PRN, progress resistance for hip abduction    Isabella Cochran, PT

## 2022-02-04 ENCOUNTER — CLINICAL SUPPORT (OUTPATIENT)
Dept: REHABILITATION | Facility: OTHER | Age: 32
End: 2022-02-04
Payer: MEDICAID

## 2022-02-04 ENCOUNTER — ROUTINE PRENATAL (OUTPATIENT)
Dept: OBSTETRICS AND GYNECOLOGY | Facility: CLINIC | Age: 32
End: 2022-02-04
Attending: OBSTETRICS & GYNECOLOGY
Payer: OTHER GOVERNMENT

## 2022-02-04 VITALS
WEIGHT: 132.25 LBS | DIASTOLIC BLOOD PRESSURE: 60 MMHG | BODY MASS INDEX: 25.83 KG/M2 | SYSTOLIC BLOOD PRESSURE: 118 MMHG

## 2022-02-04 DIAGNOSIS — O26.879 SHORT CERVIX AFFECTING PREGNANCY: ICD-10-CM

## 2022-02-04 DIAGNOSIS — O26.899 PREGNANCY RELATED LOW BACK PAIN, ANTEPARTUM: ICD-10-CM

## 2022-02-04 DIAGNOSIS — O26.899 PREGNANCY RELATED HIP PAIN, ANTEPARTUM: ICD-10-CM

## 2022-02-04 DIAGNOSIS — Z23 NEED FOR TDAP VACCINATION: ICD-10-CM

## 2022-02-04 DIAGNOSIS — M25.559 PREGNANCY RELATED HIP PAIN, ANTEPARTUM: ICD-10-CM

## 2022-02-04 DIAGNOSIS — Z34.80 SUPERVISION OF OTHER NORMAL PREGNANCY, ANTEPARTUM: Primary | ICD-10-CM

## 2022-02-04 DIAGNOSIS — Z3A.31 PREGNANCY WITH 31 COMPLETED WEEKS GESTATION: ICD-10-CM

## 2022-02-04 DIAGNOSIS — M54.50 PREGNANCY RELATED LOW BACK PAIN, ANTEPARTUM: ICD-10-CM

## 2022-02-04 PROCEDURE — 99999 PR PBB SHADOW E&M-EST. PATIENT-LVL III: ICD-10-PCS | Mod: PBBFAC,,, | Performed by: OBSTETRICS & GYNECOLOGY

## 2022-02-04 PROCEDURE — 97140 MANUAL THERAPY 1/> REGIONS: CPT | Mod: PN

## 2022-02-04 PROCEDURE — 99999 PR PBB SHADOW E&M-EST. PATIENT-LVL III: CPT | Mod: PBBFAC,,, | Performed by: OBSTETRICS & GYNECOLOGY

## 2022-02-04 PROCEDURE — 0502F SUBSEQUENT PRENATAL CARE: CPT | Mod: ,,, | Performed by: OBSTETRICS & GYNECOLOGY

## 2022-02-04 PROCEDURE — 0502F PR SUBSEQUENT PRENATAL CARE: ICD-10-PCS | Mod: ,,, | Performed by: OBSTETRICS & GYNECOLOGY

## 2022-02-04 PROCEDURE — 99213 OFFICE O/P EST LOW 20 MIN: CPT | Mod: PBBFAC | Performed by: OBSTETRICS & GYNECOLOGY

## 2022-02-04 PROCEDURE — 97110 THERAPEUTIC EXERCISES: CPT | Mod: PN

## 2022-02-04 NOTE — PROGRESS NOTES
Reports good FM.  Denies bleeding.  Feels occasional cramping.  Cx: Closed / posterior.  Continue nightly prometrium.  She continues to find PT helpful for low back / pelvic discomfort.  Tdap ordered.  Return 2 weeks.

## 2022-02-04 NOTE — PROGRESS NOTES
"[facility]   Physical Therapy Treatment Note     Name: Barbie Kumar  Clinic Number: [id number]    Therapy Diagnosis:   Encounter Diagnoses   Name Primary?    Pregnancy related low back pain, antepartum     Pregnancy related hip pain, antepartum      Physician: Milton Sanchez MD    Visit Date: 2/4/2022    Physician Orders: PT Eval and Treat   Medical Diagnosis from Referral: O26.892,M25.559 (ICD-10-CM) - Pregnancy related hip pain in second trimester, antepartum  Evaluation Date: 11/24/2021  Authorization Period Expiration: 9/28/2022  Plan of Care Expiration: 2/14/2022  Progress Note Due: 2/25/2022  Visit # / Visits authorized: 7/ 20 (10 total visits)   FOTO: #1 on 10/14/2021, 11/24/2021, 12/30/2021, 1/19/2022    PTA Visit #: 0/5     Precautions: Standard, gestation 20 weeks as of eval (ANTIONETTE is April 2022)     Time In: 11:05 am  Time out: 12:00 pm  Total Billable Time: 55 minutes    SUBJECTIVE     Pt reports: she was able to get a "nap" in. She is urinating more frequently. She his having low back and glute pain on both sides. She had her first set of contractions yesterday. She is feeling really tired. She felt good after cupping last session and she thinks the benefits lasted for a few hours after.   She was compliant with home exercise program.  Response to previous treatment: no adverse response reported  Functional change: none    Pain: 4/10  Location: pelvic floor and lumbar spine    OBJECTIVE     Updated 1/25/2022    Treatment     Barbie received the treatments listed below:      therapeutic exercises to develop strength, ROM, flexibility and core stabilization for 15 minutes including:  Hip adduction 2x10 5"  Hip abduction BTB 2x10  TRA contraction 2x10  diaphragmatic breathing 2 min  butterfly stretch 2 min    Not today:  seated HS stretch 2x30"  seated piriformis stretch 2x30"  seated sciatic nerve glides 10xB  Seated pelvic tilts 1 x 10  standing trunk flexion 1 x " "10  Qped laxmi pose stretch (knee position to tolerance) 3 x 20"  Qped tail wags 1 x 10  Qped cat/cow 1 x 10 - sequence with exhalation    manual therapy techniques: Soft tissue Mobilization were applied to the: lumbar spine and glutes for 45 minutes, including:  STM to lumbar paraspinals with pt positioned in prone in pregnancy pillow  Foam rolling glutes to alleviate tension surrounding sciatic nerve    Not today:  10 min x vacuum/cupping STM with manual therapy techniques was performed to Cedric lumbar spine to decrease muscle tightness, increase circulation and promote healing process. The pt's skin was monitored for redness adjusting pressure as needed. The pt was instructed in possible side effects of bruising and/or soreness. Pt verbalized understanding.    Modalities: 15 min x MHP to low back and glutes, performed simultaneous with supine therex      Patient Education and Home Exercises     Home Exercises Provided and Patient Education Provided     Education provided:   - no new education provided today    Written Home Exercises Provided: yes. Exercises were reviewed and Barbie was able to demonstrate them prior to the end of the session.  Barbie demonstrated good  understanding of the education provided. See EMR under Patient Instructions for exercises provided during therapy sessions    ASSESSMENT     Pt presents to PT with an increase in lumbar and glute pain. Pt had decreased tension in lumbar paraspinals and glutes following manual therapy. Pt able to perform hip abduction with an increase in resistance demonstrating an increase in glute strength. Pt verbalized a decrease in pain at the end of the session. Pt will continue to benefit from skilled PT focused on improving glute and TRA strength and decreasing tension in lumbar paraspinals so that she can complete ADLs without an increase in lumbar pain.    Barbie is progressing well towards her goals.   Pt prognosis is Good.     Pt will continue to " benefit from skilled outpatient physical therapy to address the deficits listed in the problem list box on initial evaluation, provide pt/family education and to maximize pt's level of independence in the home and community environment.     Pt's spiritual, cultural and educational needs considered and pt agreeable to plan of care and goals.     Anticipated barriers to physical therapy: standard, pregnancy, works full time, also in school for bachelor's degree    Goals:   Short Term Goals (6 Weeks):  1. Pt able to stand >=30 minutes with ADLs with <4/10 pain. (progressing, not met)  2. Pt able to walk >=30 minutes with household chores with <4/10 pain. (progressing, not met)  3.  Pt able to transfer in/out of chairs of various heights with <4/10 pain. (goal met 12/30/2021)  4. Pt to demonstrate improved functional ability with FOTO limitation <=68% disability. (goal met 1/19/2022)     Long Term Goals (12 Weeks):  1. Pt able to stand >=1 hour with with ADLs with <3/10 pain. (progressing, not met)  2. Pt able to walk >=1 hour with household chores with <3/10 pain. (progressing, not met)  3.  Pt able to squat/lift 5# from floor to waist with <3/10 pain. (progressing, not met)  4. Pt able to ascend/descend 1 flight of stairs, independently, 1 handrail, with <3/10 pain. (progressing, not met)  5. Pt able to sit >= 1 hour with CPU work with <3/10 pain. (progressing, not met)  6. Pt is independent with all bed mobility. (progressing, not met)  7. Pt will be independent with HEP and self management of symptoms. (progressing, not met)  8. Pt to demonstrate improved functional ability with FOTO limitation <=58% disability. (goal met 1/19/2022)    PLAN     Next visit: continue with manual therapy PRN, progress resistance for hip abduction    Kristin Vidales, PT

## 2022-02-08 ENCOUNTER — CLINICAL SUPPORT (OUTPATIENT)
Dept: REHABILITATION | Facility: OTHER | Age: 32
End: 2022-02-08
Payer: MEDICAID

## 2022-02-08 DIAGNOSIS — M25.559 PREGNANCY RELATED HIP PAIN, ANTEPARTUM: ICD-10-CM

## 2022-02-08 DIAGNOSIS — O26.899 PREGNANCY RELATED HIP PAIN, ANTEPARTUM: ICD-10-CM

## 2022-02-08 DIAGNOSIS — O26.899 PREGNANCY RELATED LOW BACK PAIN, ANTEPARTUM: ICD-10-CM

## 2022-02-08 DIAGNOSIS — M54.50 PREGNANCY RELATED LOW BACK PAIN, ANTEPARTUM: ICD-10-CM

## 2022-02-08 PROCEDURE — 97110 THERAPEUTIC EXERCISES: CPT | Mod: PN

## 2022-02-08 PROCEDURE — 97140 MANUAL THERAPY 1/> REGIONS: CPT | Mod: PN

## 2022-02-08 NOTE — PROGRESS NOTES
"[facility]   Physical Therapy Treatment Note     Name: Barbie Kumar  Clinic Number: [id number]    Therapy Diagnosis:   Encounter Diagnoses   Name Primary?    Pregnancy related low back pain, antepartum     Pregnancy related hip pain, antepartum      Physician: Milton Sanchez MD    Visit Date: 2/8/2022    Physician Orders: PT Eval and Treat   Medical Diagnosis from Referral: O26.892,M25.559 (ICD-10-CM) - Pregnancy related hip pain in second trimester, antepartum  Evaluation Date: 11/24/2021  Authorization Period Expiration: 9/28/2022  Plan of Care Expiration: 2/14/2022  Progress Note Due: 2/25/2022  Visit # / Visits authorized: 8/ 20 (11 total visits)   FOTO: #1 on 10/14/2021, 11/24/2021, 12/30/2021, 1/19/2022    PTA Visit #: 0/5     Precautions: Standard, gestation 20 weeks as of eval (ANTIONETTE is April 2022)     Time In: 1:00 pm  Time out: 1:55 pm  Total Billable Time: 55 minutes    SUBJECTIVE     Pt reports: Her glutes have been bothering her. Her pain was better after last session until the evening. She hasn't been able to sleep well due to being uncomfortable and pain.   She was compliant with home exercise program.  Response to previous treatment: no adverse response reported  Functional change: none    Pain: 5/10  Location: glutes    OBJECTIVE     Updated 1/25/2022    Treatment   2/8/2022   Vitals taken at 1:10pm: Blood pressure 102/75 mmHg, Pulse 103 bpm    Barbie received the treatments listed below:      therapeutic exercises to develop strength, ROM, flexibility and core stabilization for 20 minutes including:  Hip adduction 2x10 5"  Hip abduction BTB 2x10  TRA contraction 2x10  butterfly stretch 2 min  seated HS stretch 2x30"  seated piriformis stretch 2x30"  seated sciatic nerve glides 10xB    Kinesiotape applied to lumbar paraspinals: 2 I strips along paraspinals with 1 decompression strip across SIJ. Pt educated on how to care for tape, to remove after 3 days or if any " "skin irritation occurs.    Not today:  Seated pelvic tilts 1 x 10  standing trunk flexion 1 x 10  Qped laxmi pose stretch (knee position to tolerance) 3 x 20"  Qped tail wags 1 x 10  Qped cat/cow 1 x 10 - sequence with exhalation    manual therapy techniques: Soft tissue Mobilization were applied to the: lumbar spine and glutes for 35 minutes, including:  Foam rolling glutes to alleviate tension surrounding sciatic nerve  15 min x silicone cupping STM with manual therapy techniques was performed to Cedric lumbar spine to decrease muscle tightness, increase circulation and promote healing process. The pt's skin was monitored for redness adjusting pressure as needed. The pt was instructed in possible side effects of bruising and/or soreness. Pt verbalized understanding.    Modalities: 00min x MHP to low back and glutes, performed simultaneous with supine therex      Patient Education and Home Exercises     Home Exercises Provided and Patient Education Provided     Education provided:   - no new education provided today    Written Home Exercises Provided: yes. Exercises were reviewed and Barbie was able to demonstrate them prior to the end of the session.  Barbie demonstrated good  understanding of the education provided. See EMR under Patient Instructions for exercises provided during therapy sessions    ASSESSMENT     Pt presents to PT with bilateral glute pain today. Pt demonstrated a good response to cupping. Her skin showed an increase in redness demonstrating an increase in blood flow to lumbar paraspinals. Pt unable to perform TA contractions secondary to increased fetal movement. Pt able to perform all other exercises without an increase in pain. Pt continues to have increased tension in thoracic and lumbar paraspinals and decreased glute and core strength. Pt verbalized a decrease in pain to 2/10 at the end of the session. Pt will continue to benefit from skilled PT focused on improving glute and TRA strength " and decreasing tension in lumbar paraspinals so that she can complete ADLs without an increase in lumbar pain.    Barbie is progressing well towards her goals.   Pt prognosis is Good.     Pt will continue to benefit from skilled outpatient physical therapy to address the deficits listed in the problem list box on initial evaluation, provide pt/family education and to maximize pt's level of independence in the home and community environment.     Pt's spiritual, cultural and educational needs considered and pt agreeable to plan of care and goals.     Anticipated barriers to physical therapy: standard, pregnancy, works full time, also in school for bachelor's degree    Goals:   Short Term Goals (6 Weeks):  1. Pt able to stand >=30 minutes with ADLs with <4/10 pain. (progressing, not met)  2. Pt able to walk >=30 minutes with household chores with <4/10 pain. (progressing, not met)  3.  Pt able to transfer in/out of chairs of various heights with <4/10 pain. (goal met 12/30/2021)  4. Pt to demonstrate improved functional ability with FOTO limitation <=68% disability. (goal met 1/19/2022)     Long Term Goals (12 Weeks):  1. Pt able to stand >=1 hour with with ADLs with <3/10 pain. (progressing, not met)  2. Pt able to walk >=1 hour with household chores with <3/10 pain. (progressing, not met)  3.  Pt able to squat/lift 5# from floor to waist with <3/10 pain. (progressing, not met)  4. Pt able to ascend/descend 1 flight of stairs, independently, 1 handrail, with <3/10 pain. (progressing, not met)  5. Pt able to sit >= 1 hour with CPU work with <3/10 pain. (progressing, not met)  6. Pt is independent with all bed mobility. (progressing, not met)  7. Pt will be independent with HEP and self management of symptoms. (progressing, not met)  8. Pt to demonstrate improved functional ability with FOTO limitation <=58% disability. (goal met 1/19/2022)    PLAN     Next visit: continue with manual therapy PRN, ask about success of  drea Vidales, PT

## 2022-02-09 ENCOUNTER — ROUTINE PRENATAL (OUTPATIENT)
Dept: OBSTETRICS AND GYNECOLOGY | Facility: CLINIC | Age: 32
End: 2022-02-09
Payer: OTHER GOVERNMENT

## 2022-02-09 VITALS — DIASTOLIC BLOOD PRESSURE: 72 MMHG | SYSTOLIC BLOOD PRESSURE: 120 MMHG | BODY MASS INDEX: 25.97 KG/M2 | WEIGHT: 133 LBS

## 2022-02-09 DIAGNOSIS — O26.879 SHORT CERVIX AFFECTING PREGNANCY: ICD-10-CM

## 2022-02-09 DIAGNOSIS — Z34.80 SUPERVISION OF OTHER NORMAL PREGNANCY, ANTEPARTUM: Primary | ICD-10-CM

## 2022-02-09 PROCEDURE — 0502F SUBSEQUENT PRENATAL CARE: CPT | Mod: ,,, | Performed by: OBSTETRICS & GYNECOLOGY

## 2022-02-09 PROCEDURE — 99213 OFFICE O/P EST LOW 20 MIN: CPT | Mod: PBBFAC,PN | Performed by: OBSTETRICS & GYNECOLOGY

## 2022-02-09 PROCEDURE — 0502F PR SUBSEQUENT PRENATAL CARE: ICD-10-PCS | Mod: ,,, | Performed by: OBSTETRICS & GYNECOLOGY

## 2022-02-09 PROCEDURE — 99999 PR PBB SHADOW E&M-EST. PATIENT-LVL III: CPT | Mod: PBBFAC,,, | Performed by: OBSTETRICS & GYNECOLOGY

## 2022-02-09 PROCEDURE — 99999 PR PBB SHADOW E&M-EST. PATIENT-LVL III: ICD-10-PCS | Mod: PBBFAC,,, | Performed by: OBSTETRICS & GYNECOLOGY

## 2022-02-09 NOTE — PROGRESS NOTES
Reports good FM.  Denies bleeding and CTX.  Reports some lower back / pelvic discomfort - PT helping.  Cx: closed / posterior.  Continue with nightly prometrium.  Precautions reviewed.  Return 2 weeks.  [facility] bid

## 2022-02-10 ENCOUNTER — PATIENT MESSAGE (OUTPATIENT)
Dept: ADMINISTRATIVE | Facility: OTHER | Age: 32
End: 2022-02-10
Payer: OTHER GOVERNMENT

## 2022-02-12 ENCOUNTER — PATIENT MESSAGE (OUTPATIENT)
Dept: OBSTETRICS AND GYNECOLOGY | Facility: CLINIC | Age: 32
End: 2022-02-12
Payer: OTHER GOVERNMENT

## 2022-02-15 ENCOUNTER — CLINICAL SUPPORT (OUTPATIENT)
Dept: REHABILITATION | Facility: OTHER | Age: 32
End: 2022-02-15
Payer: MEDICAID

## 2022-02-15 ENCOUNTER — HOSPITAL ENCOUNTER (EMERGENCY)
Facility: OTHER | Age: 32
Discharge: HOME | End: 2022-02-15
Attending: OBSTETRICS & GYNECOLOGY
Payer: OTHER GOVERNMENT

## 2022-02-15 ENCOUNTER — TELEPHONE (OUTPATIENT)
Dept: OBSTETRICS AND GYNECOLOGY | Facility: CLINIC | Age: 32
End: 2022-02-15
Payer: OTHER GOVERNMENT

## 2022-02-15 VITALS
TEMPERATURE: 98 F | SYSTOLIC BLOOD PRESSURE: 112 MMHG | HEART RATE: 96 BPM | OXYGEN SATURATION: 97 % | DIASTOLIC BLOOD PRESSURE: 71 MMHG | RESPIRATION RATE: 18 BRPM

## 2022-02-15 DIAGNOSIS — Z3A.32 32 WEEKS GESTATION OF PREGNANCY: Primary | ICD-10-CM

## 2022-02-15 DIAGNOSIS — O26.899 PREGNANCY RELATED LOW BACK PAIN, ANTEPARTUM: ICD-10-CM

## 2022-02-15 DIAGNOSIS — R10.2 PELVIC PRESSURE IN PREGNANCY: ICD-10-CM

## 2022-02-15 DIAGNOSIS — O26.899 PELVIC PRESSURE IN PREGNANCY: ICD-10-CM

## 2022-02-15 DIAGNOSIS — M25.559 PREGNANCY RELATED HIP PAIN, ANTEPARTUM: ICD-10-CM

## 2022-02-15 DIAGNOSIS — M54.50 PREGNANCY RELATED LOW BACK PAIN, ANTEPARTUM: ICD-10-CM

## 2022-02-15 DIAGNOSIS — O26.899 PREGNANCY RELATED HIP PAIN, ANTEPARTUM: ICD-10-CM

## 2022-02-15 DIAGNOSIS — O36.8130 DECREASED FETAL MOVEMENTS IN THIRD TRIMESTER, SINGLE OR UNSPECIFIED FETUS: Primary | ICD-10-CM

## 2022-02-15 LAB
BILIRUB SERPL-MCNC: NORMAL MG/DL
BLOOD URINE, POC: NORMAL
COLOR, POC UA: NORMAL
GLUCOSE UR QL STRIP: NORMAL
KETONES UR QL STRIP: NORMAL
LEUKOCYTE ESTERASE URINE, POC: NORMAL
NITRITE, POC UA: NORMAL
PH, POC UA: NORMAL
PROTEIN, POC: NORMAL
SPECIFIC GRAVITY, POC UA: NORMAL
UROBILINOGEN, POC UA: NORMAL

## 2022-02-15 PROCEDURE — 59025 FETAL NON-STRESS TEST: CPT

## 2022-02-15 PROCEDURE — 99284 EMERGENCY DEPT VISIT MOD MDM: CPT | Mod: 25

## 2022-02-15 PROCEDURE — 59025 FETAL NON-STRESS TEST: CPT | Mod: 26,,, | Performed by: OBSTETRICS & GYNECOLOGY

## 2022-02-15 PROCEDURE — 97140 MANUAL THERAPY 1/> REGIONS: CPT | Mod: PN

## 2022-02-15 PROCEDURE — 59025 PR FETAL 2N-STRESS TEST: ICD-10-PCS | Mod: 26,,, | Performed by: OBSTETRICS & GYNECOLOGY

## 2022-02-15 PROCEDURE — 99284 PR EMERGENCY DEPT VISIT,LEVEL IV: ICD-10-PCS | Mod: 25,,, | Performed by: OBSTETRICS & GYNECOLOGY

## 2022-02-15 PROCEDURE — 97110 THERAPEUTIC EXERCISES: CPT | Mod: PN

## 2022-02-15 PROCEDURE — 99284 EMERGENCY DEPT VISIT MOD MDM: CPT | Mod: 25,,, | Performed by: OBSTETRICS & GYNECOLOGY

## 2022-02-15 NOTE — ED PROVIDER NOTES
Encounter Date: 2/15/2022       History     Chief Complaint   Patient presents with    Pelvic Pain    Decreased Fetal Movement     Barbie Kumar is a 31 y.o. [id number] at 32w5d presents complaining of abdominal cramping and pelvic pressure. Patient reports that she has felt abdominal cramping her entire pregnancy but reports that the pelvic pressure is new. She has a short cervix that was diagnosed on ultrasound at a 20 week scan for which she takes prometrium and gets weekly cervical checks.  Patient denies vaginal bleeding. She reports LOF three days ago that she believes was due to incontinence but has not had any further LOF since the one episode. She reports slightly decreased fetal movement today.     This IUP is complicated by shortened cervix (21 mm at 20 weeks) on prometrium, and hypoplastic nasal bone.         Review of patient's allergies indicates:   Allergen Reactions    Phenergan vc [promethazine-phenylephrine] Anxiety    Percocet [oxycodone-acetaminophen] Anxiety     Past Medical History:   Diagnosis Date    Abnormal Pap smear     Arrhythmia     Condyloma     Tuberculosis      Past Surgical History:   Procedure Laterality Date    NO PAST SURGERIES       Family History   Problem Relation Age of Onset    Hypertension Paternal Grandmother     Diabetes Paternal Grandmother     Hypertension Father     Diabetes Mother     Hypertension Maternal Grandmother     Diabetes Maternal Grandmother     Hypertension Maternal Grandfather     Diabetes Maternal Grandfather     Hypertension Paternal Grandfather     Diabetes Paternal Grandfather     Ovarian cancer Neg Hx     Colon cancer Neg Hx     Breast cancer Neg Hx     Stroke Neg Hx      Social History     Tobacco Use    Smoking status: Never Smoker    Smokeless tobacco: Never Used   Substance Use Topics    Alcohol use: No     Alcohol/week: 0.0 standard drinks    Drug use: No     Review of Systems   Constitutional: Negative for chills and  fever.   HENT: Negative for congestion.    Eyes: Negative for visual disturbance.   Respiratory: Negative for cough and shortness of breath.    Cardiovascular: Negative for chest pain.   Gastrointestinal: Positive for abdominal pain. Negative for constipation, diarrhea, nausea and vomiting.   Genitourinary: Positive for pelvic pain. Negative for dysuria.   Musculoskeletal: Negative for back pain.   Neurological: Negative for headaches.       Physical Exam     Initial Vitals [02/15/22 1435]   BP Pulse Resp Temp SpO2   112/71 96 18 98.2 °F (36.8 °C) 97 %      MAP       --         Physical Exam    Constitutional: She appears well-developed and well-nourished.   HENT:   Head: Normocephalic and atraumatic.   Pulmonary/Chest: No respiratory distress.   Abdominal: Abdomen is soft. She exhibits no distension. There is no abdominal tenderness. There is no rebound and no guarding.   Musculoskeletal:         General: Normal range of motion.     Neurological: She is alert and oriented to person, place, and time.   Skin: Skin is warm and dry.   Psychiatric: She has a normal mood and affect.     OB LABOR EXAM:                       Comments: SSE: normal external genitalia, normal cervical and vaginal mucosa, not grossly ruptured, no pooling, cervix visibly closed  SVE: ft/50/-3       ED Course   Obtain Fetal nonstress test (NST)    Date/Time: 2/15/2022 2:37 PM  Performed by: Julieta Ferraro MD  Authorized by: Julieta Ferraro MD     Nonstress Test:     Variability:  6-25 BPM    Decelerations:  None    Accelerations:  15 bpm    Baseline:  140    Uterine Irritability: Yes      Contractions:  Not present  Biophysical Profile:     Nonstress Test Interpretation: reactive      Overall Impression:  Reassuring  Post-procedure:     Patient tolerance:  Patient tolerated the procedure well with no immediate complications      Labs Reviewed   POCT URINALYSIS, DIPSTICK OR TABLET REAGENT, AUTOMATED, WITH MICROSCOP          Imaging Results     None          Medications - No data to display  Medical Decision Making:   ED Management:  31 y.o. [id number] at 32w5d presents complaining of abdominal cramping and pelvic pressure.  VSS  NST: reactive and reassuring, no contractions on toco  SSE: normal external genitalia, normal cervical and vaginal mucosa, not grossly ruptured, no pooling, cervix visibly closed  SVE: ft/50/-3  UA: WNL  Patient reports that abdominal cramping has subsided but continues to report pelvic pressure and pain with fetal movement  Patient counseled on pains of pregnancy including management options   Patient stable for discharge. Return precautions given.                 Attending Attestation:   Physician Attestation Statement for Resident:  As the supervising MD   Physician Attestation Statement: I have personally seen and examined this patient.   I agree with the above history. -:   As the supervising MD I agree with the above PE.    As the supervising MD I agree with the above treatment, course, plan, and disposition.  I was personally present during the critical portions of the procedure(s) performed by the resident and was immediately available in the ED to provide services and assistance as needed during the entire procedure.                         Clinical Impression:   Final diagnoses:  [Z3A.32] 32 weeks gestation of pregnancy (Primary)  [O26.899, R10.2] Pelvic pressure in pregnancy          ED Disposition Condition    Discharge Stable        ED Prescriptions     None        Follow-up Information    None          Julieta Ferraro MD  Resident  02/15/22 [id number]       Jordyn Espinoza MD  [phone]

## 2022-02-15 NOTE — ED TRIAGE NOTES
Pt presents to [facility] c/o pelvic pain and decreased fetal movement.  Pt denies VB or LOF.  Dr. Ferraro aware of pt's arrival.

## 2022-02-15 NOTE — PROGRESS NOTES
[facility]   Physical Therapy Treatment Note     Name: Barbie Kumar  [facility] Number: [id number]    Therapy Diagnosis:   Encounter Diagnoses   Name Primary?    Pregnancy related low back pain, antepartum     Pregnancy related hip pain, antepartum      Physician: Milton Sanchez MD    Visit Date: 2/15/2022    Physician Orders: PT Eval and Treat   Medical Diagnosis from Referral: O26.892,M25.559 (ICD-10-CM) - Pregnancy related hip pain in second trimester, antepartum  Evaluation Date: 11/24/2021  Authorization Period Expiration: 9/28/2022  Plan of Care Expiration: 2/14/2022  Progress Note Due: 2/25/2022  Visit # / Visits authorized: 8/ 20 (11 total visits)   FOTO: #1 on 10/14/2021, 11/24/2021, 12/30/2021, 1/19/2022    PTA Visit #: 0/5     Precautions: Standard, gestation 20 weeks as of eval (ANTIONETTE is April 2022)     Time In: 10:05 am  Time out: 10:45 am (pt requested to leave early)  Total Billable Time: 40 minutes    SUBJECTIVE     Pt reports:Her low back into her glutes is an 8/10 pain. She woke up in a 10/10 pain and had a hard time getting out of bed. She is still having pain in the front. She experienced some itching from the tape. She no longer experienced itching once the tape was removed.   She was compliant with home exercise program.  Response to previous treatment: no adverse response reported  Functional change: none    Pain: 8/10  Location: glutes and lumbar spine    OBJECTIVE     Updated 1/25/2022    Treatment     Barbie received the treatments listed below:      therapeutic exercises to develop strength, ROM, flexibility and core stabilization for 05 minutes including:  Seated pelvic tilts x10  Standing pelvic tilts with upper body leaning on table x10    manual therapy techniques: Soft tissue Mobilization were applied to the: lumbar spine and glutes for 35 minutes, including:  STM to lumbar paraspinals and upper glutes with pt positioned in prone in pregnancy pillow  SIJ  MET- L resisted posteriorly, R resisted anteriorly    Modalities: 05min x MHP to low back and glutes, performed simultaneous with supine therex      Patient Education and Home Exercises     Home Exercises Provided and Patient Education Provided     Education provided:   - no new education provided today    Written Home Exercises Provided: yes. Exercises were reviewed and Barbie was able to demonstrate them prior to the end of the session.  Barbie demonstrated good  understanding of the education provided. See EMR under Patient Instructions for exercises provided during therapy sessions    ASSESSMENT     Pt presents to PT with lumbar and lower abdominal pain. Pt's pelvic alignment was improved in supine following SIJ MET. Pt able to tolerate manual therapy but experience an increase in pain and fatigue during therex. Pt requested to leave the session early. Pt was escorted by PT to her car due to increase in pain. Pt continues to have poor standing and seated posture that increases lumbar lordosis secondary to poor TA activation. Pt ambulates with wide MEGAN and antalgic gait pattern secondary to pain and muscle weakness. Pt will continue to benefit from skilled PT focused on improving glute and TRA strength and decreasing tension in lumbar paraspinals so that she can complete ADLs without an increase in lumbar pain.    Barbie is progressing well towards her goals.   Pt prognosis is Good.     Pt will continue to benefit from skilled outpatient physical therapy to address the deficits listed in the problem list box on initial evaluation, provide pt/family education and to maximize pt's level of independence in the home and community environment.     Pt's spiritual, cultural and educational needs considered and pt agreeable to plan of care and goals.     Anticipated barriers to physical therapy: standard, pregnancy, works full time, also in school for bachelor's degree    Goals:   Short Term Goals (6 Weeks):  1. Pt  able to stand >=30 minutes with ADLs with <4/10 pain. (progressing, not met)  2. Pt able to walk >=30 minutes with household chores with <4/10 pain. (progressing, not met)  3.  Pt able to transfer in/out of chairs of various heights with <4/10 pain. (goal met 12/30/2021)  4. Pt to demonstrate improved functional ability with FOTO limitation <=68% disability. (goal met 1/19/2022)     Long Term Goals (12 Weeks):  1. Pt able to stand >=1 hour with with ADLs with <3/10 pain. (progressing, not met)  2. Pt able to walk >=1 hour with household chores with <3/10 pain. (progressing, not met)  3.  Pt able to squat/lift 5# from floor to waist with <3/10 pain. (progressing, not met)  4. Pt able to ascend/descend 1 flight of stairs, independently, 1 handrail, with <3/10 pain. (progressing, not met)  5. Pt able to sit >= 1 hour with CPU work with <3/10 pain. (progressing, not met)  6. Pt is independent with all bed mobility. (progressing, not met)  7. Pt will be independent with HEP and self management of symptoms. (progressing, not met)  8. Pt to demonstrate improved functional ability with FOTO limitation <=58% disability. (goal met 1/19/2022)    PLAN     Next visit: continue with manual therapy PRN, view POC section for updated POC    Kristin Vidales, PT

## 2022-02-15 NOTE — DISCHARGE INSTRUCTIONS
"Patient Education       Labor and Delivery (Childbirth)   The Basics   Written by the doctors and editors at [facility]   What happens during labor? -- Labor is the way your body prepares to give birth when you are pregnant. This involves having contractions, which is when the uterus tightens. Contractions can be painful and make your belly feel hard. During labor, your cervix softens, thins out, and opens up or "dilates". As you get closer to giving birth, your baby will move down from the uterus into the vagina. When this happens, it can feel like you are going to have a bowel movement.  Labor usually starts on its own between 37 and 42 weeks of pregnancy. In some cases, doctors will decide to "induce" labor. This sometimes involves first giving you medicine to soften your cervix. Another way to soften the cervix is to put a thin tube into your vagina and through the cervix. Medicine is usually needed to start contractions. This medicine is given into your vein (by "IV"). Sometimes labor is induced in other ways, too.  Doctors only induce labor before 39 weeks if there is a medical reason. Usually, this means a situation where waiting to give birth could be dangerous for you or your baby.  What happens during delivery? -- During delivery, the doctor or midwife will help you give birth to your baby. When a baby comes out of the vagina, it's called a "vaginal delivery." When a doctor does surgery to get a baby out of the uterus, it's called a "" or " delivery."  During a vaginal delivery, once your cervix has opened all the way, you will work hard to push your baby out (figure 1). Your doctor or midwife will tell you when you can start pushing. In most cases, you can be in whatever position feels comfortable to you. For example, you can lie on your side, sit up, kneel, or squat. Pushing a baby out can take anywhere from minutes to hours. It usually takes longer if it is your first baby.  Most mothers " "can push the baby out without any problems. But sometimes, the doctor or midwife will help get the baby out by pulling on a device that can be placed on the baby's head. If the doctor needs to deliver a baby right away, they will do a .  Does childbirth hurt? -- Yes, childbirth usually hurts. Pain can come both from contractions and, later, from your vagina stretching as you push your baby out. But the amount of pain is different for everyone. People choose to manage their pain in different ways. There is no one way that works for everyone. The right decision is the one that is best for you.  Some people choose to have a "natural" childbirth. This means that they do not use any pain medicines during labor or delivery. Instead, they do other things, such as breathing exercises, to lessen their pain.  Other people choose to have medicines to lessen the pain of labor and delivery. If you choose to have pain medicine, your doctor or midwife will probably start giving you the medicine during your labor, before delivery. There are different ways of getting medicines for pain. Medicine might be breathed in, given through a vein (by "IV"), or given through a thin tube that is placed in your back (an "epidural block").  What if my baby is not in the right position? -- Before birth, babies lie in the uterus in different positions (figure 2). At the end of pregnancy, most babies lie with their head closest to the vagina. But some babies lie with their legs, buttocks, or shoulders closest to the vagina. Doctors call it "breech" if a baby's legs or buttocks are closest to the vagina.  If your baby isn't facing head down, your doctor or midwife will talk with you about your options. They might be able to turn your baby before you go into labor and deliver them vaginally. Or they might suggest that you have a .  What happens after I give birth? -- After your baby is born, the doctor or midwife will give the baby " "to you, or to a pediatrician if the baby needs to be checked right away. If you and your baby are both healthy, the doctor or midwife might wait about a minute before they clamp and cut the umbilical cord. This allows the baby to get some of the blood that is in the placenta. (The placenta is the organ inside the uterus that brings a baby nutrients and oxygen and carries away waste.)  Next, the placenta also needs to come out of the uterus. Usually the placenta comes out naturally within 30 minutes of the baby's birth, but sometimes the doctor or midwife has to help remove it from the uterus.  After the placenta is out of your uterus, the doctor or midwife will examine your vagina and vulva. If your skin tore during birth, you might need some stitches.  What happens to my baby after birth? -- After birth, the doctor, nurse, midwife, or pediatrician will do a quick exam to check your baby's body and general health. Part of this exam is called an "Apgar test." It checks your baby's heart rate, breathing, movement, muscles, and skin color. Your baby will get Apgar tests at 1 minute and 5 minutes after birth.  Soon after birth, you will be able to hold your baby. You can even breastfeed them, if you choose to breastfeed.  Your baby will get some treatments soon after birth. These include eye drops or an eye ointment to prevent an eye infection, and a dose of vitamin K to prevent abnormal bleeding.  Before your baby leaves the hospital, they will also have:  · A detailed physical exam  · A blood test (done by a heel prick) to check for different serious diseases that babies can be born with. For more information on this testing, ask your doctor or nurse.  · A hearing test  · A dose of the hepatitis B vaccine - Vaccines can prevent certain serious or deadly infections. Hepatitis B is a serious liver infection.  When should I call the doctor or nurse after a vaginal delivery? -- After you leave the hospital, call your " "doctor or nurse if you:  · Bleed a lot from your vagina - It is normal to have some vaginal bleeding for a few weeks after delivery. But let your doctor or nurse know if you are having large blood clots or your bleeding increases.  · Feel dizzy or faint  · Get a fever  · Vomit  · Have new belly pain  · Have a severe headache or problems with your vision  · Feel sad or helpless  All topics are updated as new evidence becomes available and our peer review process is complete.  This topic retrieved from [employer] on: Sep 21, 2021.  Topic 02163 Version 14.0  Release: 29.4.2 - C29.263  © 2021 UpToDate, Inc. and/or its affiliates. All rights reserved.  figure 1: Vaginal delivery (childbirth)     During a vaginal delivery, a woman pushes her baby out of her vagina.  Graphic 70316 Version 6.0    figure 2: Different baby positions in the uterus     Before birth, babies lie in the uterus in different positions. Most babies lie with their head down and close to the vagina (as shown in A). Doctors also call this "vertex" or "cephalic" presentation. But some babies lie with their legs, buttocks, or shoulders down. Doctors call it "breech" if a baby's legs or buttocks are closest to the vagina (as shown in B).  Graphic 92575 Version 3.0    Consumer Information Use and Disclaimer   This information is not specific medical advice and does not replace information you receive from your health care provider. This is only a brief summary of general information. It does NOT include all information about conditions, illnesses, injuries, tests, procedures, treatments, therapies, discharge instructions or life-style choices that may apply to you. You must talk with your health care provider for complete information about your health and treatment options. This information should not be used to decide whether or not to accept your health care provider's advice, instructions or recommendations. Only your health care provider has the knowledge " and training to provide advice that is right for you. The use of this information is governed by the [employer] End User License Agreement, available at https://www.[employer].Perfectore/en/solutions/[employer]/about/desmond.The use of [employer] content is governed by the [employer] Terms of Use. ©2021 UpToDate, Inc. All rights reserved.  Copyright   © 2021 UpToDate, Inc. and/or its affiliates. All rights reserved.

## 2022-02-16 ENCOUNTER — ROUTINE PRENATAL (OUTPATIENT)
Dept: OBSTETRICS AND GYNECOLOGY | Facility: CLINIC | Age: 32
End: 2022-02-16
Payer: OTHER GOVERNMENT

## 2022-02-16 VITALS
WEIGHT: 133.38 LBS | DIASTOLIC BLOOD PRESSURE: 68 MMHG | SYSTOLIC BLOOD PRESSURE: 114 MMHG | BODY MASS INDEX: 26.05 KG/M2

## 2022-02-16 DIAGNOSIS — R82.90 ABNORMAL URINE FINDINGS: ICD-10-CM

## 2022-02-16 DIAGNOSIS — Z34.80 SUPERVISION OF OTHER NORMAL PREGNANCY, ANTEPARTUM: Primary | ICD-10-CM

## 2022-02-16 DIAGNOSIS — Z3A.32 PREGNANCY WITH 32 COMPLETED WEEKS GESTATION: ICD-10-CM

## 2022-02-16 DIAGNOSIS — O26.879 SHORT CERVIX AFFECTING PREGNANCY: ICD-10-CM

## 2022-02-16 PROCEDURE — 99213 OFFICE O/P EST LOW 20 MIN: CPT | Mod: PBBFAC,PN | Performed by: OBSTETRICS & GYNECOLOGY

## 2022-02-16 PROCEDURE — 99999 PR PBB SHADOW E&M-EST. PATIENT-LVL III: ICD-10-PCS | Mod: PBBFAC,,, | Performed by: OBSTETRICS & GYNECOLOGY

## 2022-02-16 PROCEDURE — 0502F SUBSEQUENT PRENATAL CARE: CPT | Mod: ,,, | Performed by: OBSTETRICS & GYNECOLOGY

## 2022-02-16 PROCEDURE — 0502F PR SUBSEQUENT PRENATAL CARE: ICD-10-PCS | Mod: ,,, | Performed by: OBSTETRICS & GYNECOLOGY

## 2022-02-16 PROCEDURE — 87086 URINE CULTURE/COLONY COUNT: CPT | Performed by: OBSTETRICS & GYNECOLOGY

## 2022-02-16 PROCEDURE — 99999 PR PBB SHADOW E&M-EST. PATIENT-LVL III: CPT | Mod: PBBFAC,,, | Performed by: OBSTETRICS & GYNECOLOGY

## 2022-02-16 NOTE — PROGRESS NOTES
Reports good FM now (Seen yesterday in DOM for decreased fetal movement).  Denies bleeding and CTX, but feels more pressure.  Cx: FT / posterior.  Continue nightly Prometrium.  Urine with ketones and trace blood - will check urine culture.  Precautions / warning signs reviewed.  Return 1 week.  [facility] bid.

## 2022-02-17 LAB — BACTERIA UR CULT: NO GROWTH

## 2022-02-17 NOTE — PLAN OF CARE
Outpatient Therapy Updated Plan of Care     Visit Date: 2/15/2022  Name: Barbie Kumar  Clinic Number: [id number]    Therapy Diagnosis:   Encounter Diagnoses   Name Primary?    Pregnancy related low back pain, antepartum     Pregnancy related hip pain, antepartum      Physician: Milton Sanchez MD    Physician Orders: PT Eval and Treat   Medical Diagnosis from Referral: O26.892,M25.559 (ICD-10-CM) - Pregnancy related hip pain in second trimester, antepartum  Evaluation Date: 11/24/2021    Total Visits Received: 11  Cancelled Visits: 0  No Show Visits: 0    Current Certification Period:  11/24/2022 to 2/14/2022  Precautions:  Standard, gestation 20 weeks as of eval (ANTIONETTE is April 2022)  Visits from Evaluation Date:  10  Functional Level Prior to Evaluation:  Able to complete ADLs without an increase in hip/back pain    Subjective     Update: She reports her low back into her glutes is an 8/10 pain. She woke up in a 10/10 pain and had a hard time getting out of bed. She is still having pain in the front of pelvis. She experienced some itching from the tape. She no longer experienced itching once the tape was removed.    Objective     Update:   Lower Extremity Strength  Right LE   Left LE     Hip flexion: 4+/5 Hip flexion: 4+/5   Hip External rotation:  3+/5 Hip External rotation: 3+/5   Hip Internal rotation    3+/5 Hip Internal rotation 3+/5   Knee Flexion 5/5 Knee Flexion 5/5   Knee Extension 5/5 Knee Extension 5/5     Assessment     Update: Pt presents to PT with lumbar and lower abdominal pain. Pt's pelvic alignment was improved in supine following SIJ MET. Pt able to tolerate manual therapy but experience an increase in pain and fatigue during therex. Pt requested to leave the session early. Pt was escorted by PT to her car due to increase in pain. Pt continues to have poor standing and seated posture that increases lumbar lordosis secondary to poor TA activation. Pt ambulates with wide MEGAN and antalgic gait  pattern secondary to pain and muscle weakness. Pt will continue to benefit from skilled PT focused on improving glute and TRA strength and decreasing tension in lumbar paraspinals so that she can complete ADLs without an increase in lumbar pain.    Previous Short Term Goals Status:    1. Pt able to stand >=30 minutes with ADLs with <4/10 pain. (progressing, not met)  2. Pt able to walk >=30 minutes with household chores with <4/10 pain. (progressing, not met)  3.  Pt able to transfer in/out of chairs of various heights with <4/10 pain. (goal met 12/30/2021)  4. Pt to demonstrate improved functional ability with FOTO limitation <=68% disability. (goal met 1/19/2022)  New Short Term Goals Status:   Progressing, not met  Long Term Goal Status:   continue per initial plan of care.  Reasons for Recertification of Therapy:   Pt experiences short term pain relief with physical therapy visits and would benefit from continued PT throughout the duration of her pregnancy    Plan     Updated Certification Period: 2/15/2022 to 4/12/2022  Recommended Treatment Plan: 2 times per week for 8 weeks: Gait Training, Manual Therapy, Moist Heat/ Ice, Neuromuscular Re-ed, Patient Education, Self Care, Therapeutic Activities, Therapeutic Exercise, Ultrasound and Dry needling  Other Recommendations: Continue manual as needed, continue with strengthening exercises for glutes    Kristin Vidales, PT  2/15/2022      I CERTIFY THE NEED FOR THESE SERVICES FURNISHED UNDER THIS PLAN OF TREATMENT AND WHILE UNDER MY CARE    Physician's comments:        Physician's Signature: ___________________________________________________

## 2022-02-18 ENCOUNTER — PATIENT MESSAGE (OUTPATIENT)
Dept: OBSTETRICS AND GYNECOLOGY | Facility: CLINIC | Age: 32
End: 2022-02-18
Payer: OTHER GOVERNMENT

## 2022-02-23 ENCOUNTER — ROUTINE PRENATAL (OUTPATIENT)
Dept: OBSTETRICS AND GYNECOLOGY | Facility: CLINIC | Age: 32
End: 2022-02-23
Payer: OTHER GOVERNMENT

## 2022-02-23 VITALS
DIASTOLIC BLOOD PRESSURE: 70 MMHG | BODY MASS INDEX: 26.48 KG/M2 | SYSTOLIC BLOOD PRESSURE: 118 MMHG | WEIGHT: 135.56 LBS

## 2022-02-23 DIAGNOSIS — O26.879 SHORT CERVIX AFFECTING PREGNANCY: ICD-10-CM

## 2022-02-23 DIAGNOSIS — Z34.80 SUPERVISION OF OTHER NORMAL PREGNANCY, ANTEPARTUM: Primary | ICD-10-CM

## 2022-02-23 PROCEDURE — 0502F PR SUBSEQUENT PRENATAL CARE: ICD-10-PCS | Mod: ,,, | Performed by: OBSTETRICS & GYNECOLOGY

## 2022-02-23 PROCEDURE — 0502F SUBSEQUENT PRENATAL CARE: CPT | Mod: ,,, | Performed by: OBSTETRICS & GYNECOLOGY

## 2022-02-23 PROCEDURE — 99999 PR PBB SHADOW E&M-EST. PATIENT-LVL III: CPT | Mod: PBBFAC,,, | Performed by: OBSTETRICS & GYNECOLOGY

## 2022-02-23 PROCEDURE — 99213 OFFICE O/P EST LOW 20 MIN: CPT | Mod: PBBFAC,PN | Performed by: OBSTETRICS & GYNECOLOGY

## 2022-02-23 PROCEDURE — 99999 PR PBB SHADOW E&M-EST. PATIENT-LVL III: ICD-10-PCS | Mod: PBBFAC,,, | Performed by: OBSTETRICS & GYNECOLOGY

## 2022-02-23 NOTE — PROGRESS NOTES
Reports appropriate FM.  Denies bleeding and CTX.  Feels some pressure.  Cx: closed / posterior, vertex by hand held office sono.  Continue nightly progesterone.  Continue PT.  She is sure that she wants PP BTL.  Return 1 week.  [facility] bid.

## 2022-02-24 ENCOUNTER — CLINICAL SUPPORT (OUTPATIENT)
Dept: REHABILITATION | Facility: OTHER | Age: 32
End: 2022-02-24
Payer: MEDICAID

## 2022-02-24 DIAGNOSIS — M25.559 PREGNANCY RELATED HIP PAIN, ANTEPARTUM: Primary | ICD-10-CM

## 2022-02-24 DIAGNOSIS — O26.899 PREGNANCY RELATED LOW BACK PAIN, ANTEPARTUM: ICD-10-CM

## 2022-02-24 DIAGNOSIS — M54.50 PREGNANCY RELATED LOW BACK PAIN, ANTEPARTUM: ICD-10-CM

## 2022-02-24 DIAGNOSIS — O26.899 PREGNANCY RELATED HIP PAIN, ANTEPARTUM: Primary | ICD-10-CM

## 2022-02-24 PROCEDURE — 97140 MANUAL THERAPY 1/> REGIONS: CPT | Mod: PN

## 2022-02-24 PROCEDURE — 97110 THERAPEUTIC EXERCISES: CPT | Mod: PN

## 2022-02-24 NOTE — PROGRESS NOTES
"[facility]   Physical Therapy Treatment Note     Name: Barbie Kumar  Clinic Number: [id number]    Therapy Diagnosis:   Encounter Diagnoses   Name Primary?    Pregnancy related hip pain, antepartum Yes    Pregnancy related low back pain, antepartum      Physician: Mitlon Sanchez MD    Visit Date: 2/24/2022    Physician Orders: PT Eval and Treat   Medical Diagnosis from Referral: O26.892,M25.559 (ICD-10-CM) - Pregnancy related hip pain in second trimester, antepartum  Evaluation Date: 11/24/2021  Authorization Period Expiration: 9/28/2022  Plan of Care Expiration: 4/12/2022  Progress Note Due: 3/15/2022  Visit # / Visits authorized: 10/ 20 (13 total visits)   FOTO: #1 on 10/14/2021, 11/24/2021, 12/30/2021, 1/19/2022     PTA Visit #: 0/5     Precautions: Standard, gestation 20 weeks as of eval (ANTIONETTE is April 2022)     Time In: 9:05 am  Time out: 10:00 am  Total Billable Time: 55 minutes    SUBJECTIVE     Pt reports: She is feeling better but she is having more pain in her glutes. She saw Dr. Sanchez yesterday and he stated the baby is head down. She reports she is likely going to deliver in March. She is not having as much discomfort in the front. She was laying on her R side this morning and both of her legs were numb and tingling.    She was compliant with home exercise program.  Response to previous treatment: no adverse response reported  Functional change: none    Pain: 5/10  Location: glutes    OBJECTIVE     Updated 2/15/2022    Treatment     Barbie received the treatments listed below:      therapeutic exercises to develop strength, ROM, flexibility and core stabilization for 20 minutes including:  Seated glute sets 2x10 5"  Hip adduction 2x10 5"  Hip abduction BTB 2x10  butterfly stretch 2 min  seated HS stretch 2x30"  seated piriformis stretch 2x30"  seated sciatic nerve glides 10xB    Not today:  Seated pelvic tilts 1 x 10  standing trunk flexion 1 x 10  Qped laxmi pose " "stretch (knee position to tolerance) 3 x 20"  Qped tail wags 1 x 10  Qped cat/cow 1 x 10 - sequence with exhalation    manual therapy techniques: Soft tissue Mobilization were applied to the: lumbar spine and glutes for 35 minutes, including:  Foam rolling glutes to alleviate tension surrounding sciatic nerve  STM to lumbar paraspinals and upper glutes with pt positioned in prone in pregnancy pillow    Modalities: 15min x cold pack to low back and glutes, performed simultaneous with supine therex      Patient Education and Home Exercises     Home Exercises Provided and Patient Education Provided     Education provided:   - no new education provided today    Written Home Exercises Provided: yes. Exercises were reviewed and Barbie was able to demonstrate them prior to the end of the session.  Barbie demonstrated good  understanding of the education provided. See EMR under Patient Instructions for exercises provided during therapy sessions    ASSESSMENT     Pt presents to PT with bilateral glute and lumbar pain. Pt had increased tone in lumbar, thoracic paraspinals, and upper glutes that was alleviated with STM. Pt able to perform exercises without an increase in pain. Pt verbalized a reduction to 0/10 pain at the end of the session. Pt continues to have glute and core weakness. Pt will continue to benefit from skilled PT focused on improving glute and TRA strength and decreasing tension in lumbar paraspinals so that she can complete ADLs without an increase in lumbar pain.    Barbie is progressing well towards her goals.   Pt prognosis is Good.     Pt will continue to benefit from skilled outpatient physical therapy to address the deficits listed in the problem list box on initial evaluation, provide pt/family education and to maximize pt's level of independence in the home and community environment.     Pt's spiritual, cultural and educational needs considered and pt agreeable to plan of care and goals.   "   Anticipated barriers to physical therapy: standard, pregnancy, works full time, also in school for bachelor's degree    Goals:   Short Term Goals (6 Weeks):  1. Pt able to stand >=30 minutes with ADLs with <4/10 pain. (progressing, not met)  2. Pt able to walk >=30 minutes with household chores with <4/10 pain. (progressing, not met)  3.  Pt able to transfer in/out of chairs of various heights with <4/10 pain. (goal met 12/30/2021)  4. Pt to demonstrate improved functional ability with FOTO limitation <=68% disability. (goal met 1/19/2022)     Long Term Goals (12 Weeks):  1. Pt able to stand >=1 hour with with ADLs with <3/10 pain. (progressing, not met)  2. Pt able to walk >=1 hour with household chores with <3/10 pain. (progressing, not met)  3.  Pt able to squat/lift 5# from floor to waist with <3/10 pain. (progressing, not met)  4. Pt able to ascend/descend 1 flight of stairs, independently, 1 handrail, with <3/10 pain. (progressing, not met)  5. Pt able to sit >= 1 hour with CPU work with <3/10 pain. (progressing, not met)  6. Pt is independent with all bed mobility. (progressing, not met)  7. Pt will be independent with HEP and self management of symptoms. (progressing, not met)  8. Pt to demonstrate improved functional ability with FOTO limitation <=58% disability. (goal met 1/19/2022)    PLAN     Next visit: continue with manual therapy ZORAN Vidales, PT

## 2022-03-02 ENCOUNTER — TELEPHONE (OUTPATIENT)
Dept: OBSTETRICS AND GYNECOLOGY | Facility: CLINIC | Age: 32
End: 2022-03-02
Payer: OTHER GOVERNMENT

## 2022-03-02 ENCOUNTER — HOSPITAL ENCOUNTER (EMERGENCY)
Facility: OTHER | Age: 32
Discharge: HOME | End: 2022-03-02
Attending: OBSTETRICS & GYNECOLOGY
Payer: OTHER GOVERNMENT

## 2022-03-02 VITALS
WEIGHT: 135.81 LBS | SYSTOLIC BLOOD PRESSURE: 120 MMHG | DIASTOLIC BLOOD PRESSURE: 74 MMHG | BODY MASS INDEX: 26.52 KG/M2

## 2022-03-02 VITALS
TEMPERATURE: 98 F | SYSTOLIC BLOOD PRESSURE: 117 MMHG | RESPIRATION RATE: 20 BRPM | DIASTOLIC BLOOD PRESSURE: 73 MMHG | OXYGEN SATURATION: 99 % | HEART RATE: 124 BPM

## 2022-03-02 DIAGNOSIS — O26.879 SHORT CERVIX AFFECTING PREGNANCY: ICD-10-CM

## 2022-03-02 DIAGNOSIS — Z3A.34 PREGNANCY WITH 34 COMPLETED WEEKS GESTATION: ICD-10-CM

## 2022-03-02 DIAGNOSIS — Z34.80 SUPERVISION OF OTHER NORMAL PREGNANCY, ANTEPARTUM: Primary | ICD-10-CM

## 2022-03-02 DIAGNOSIS — Z3A.34 34 WEEKS GESTATION OF PREGNANCY: ICD-10-CM

## 2022-03-02 DIAGNOSIS — R10.9 ABDOMINAL PAIN, UNSPECIFIED ABDOMINAL LOCATION: Primary | ICD-10-CM

## 2022-03-02 LAB
BILIRUB SERPL-MCNC: NEGATIVE MG/DL
BILIRUB UR QL STRIP: NEGATIVE
BLOOD URINE, POC: NEGATIVE
CLARITY UR: ABNORMAL
COLOR UR: YELLOW
COLOR, POC UA: YELLOW
GLUCOSE UR QL STRIP: NEGATIVE
GLUCOSE UR QL STRIP: NORMAL
HGB UR QL STRIP: NEGATIVE
KETONES UR QL STRIP: NEGATIVE
KETONES UR QL STRIP: NEGATIVE
LEUKOCYTE ESTERASE UR QL STRIP: ABNORMAL
LEUKOCYTE ESTERASE URINE, POC: NORMAL
MICROSCOPIC COMMENT: ABNORMAL
NITRITE UR QL STRIP: NEGATIVE
NITRITE, POC UA: NEGATIVE
PH UR STRIP: 8.5 [PH] (ref 5–8)
PH, POC UA: 8
PROT UR QL STRIP: NEGATIVE
RBC #/AREA URNS HPF: 2 /HPF (ref 0–4)
SP GR UR STRIP: 1.01 (ref 1–1.03)
SPECIFIC GRAVITY, POC UA: 1.01
SQUAMOUS #/AREA URNS HPF: 25 /HPF
URN SPEC COLLECT METH UR: ABNORMAL
UROBILINOGEN UR STRIP-ACNC: NEGATIVE EU/DL
UROBILINOGEN, POC UA: NEGATIVE
WBC #/AREA URNS HPF: 8 /HPF (ref 0–5)
YEAST URNS QL MICRO: ABNORMAL

## 2022-03-02 PROCEDURE — 59025 FETAL NON-STRESS TEST: CPT

## 2022-03-02 PROCEDURE — 99999 PR PBB SHADOW E&M-EST. PATIENT-LVL II: ICD-10-PCS | Mod: PBBFAC,,, | Performed by: OBSTETRICS & GYNECOLOGY

## 2022-03-02 PROCEDURE — 99284 EMERGENCY DEPT VISIT MOD MDM: CPT | Mod: 25,27

## 2022-03-02 PROCEDURE — 0502F SUBSEQUENT PRENATAL CARE: CPT | Mod: ,,, | Performed by: OBSTETRICS & GYNECOLOGY

## 2022-03-02 PROCEDURE — 99999 PR PBB SHADOW E&M-EST. PATIENT-LVL II: CPT | Mod: PBBFAC,,, | Performed by: OBSTETRICS & GYNECOLOGY

## 2022-03-02 PROCEDURE — 99284 PR EMERGENCY DEPT VISIT,LEVEL IV: ICD-10-PCS | Mod: 25,,, | Performed by: OBSTETRICS & GYNECOLOGY

## 2022-03-02 PROCEDURE — 59025 FETAL NON-STRESS TEST: CPT | Mod: 26,,, | Performed by: OBSTETRICS & GYNECOLOGY

## 2022-03-02 PROCEDURE — 99212 OFFICE O/P EST SF 10 MIN: CPT | Mod: PBBFAC,PN | Performed by: OBSTETRICS & GYNECOLOGY

## 2022-03-02 PROCEDURE — 81000 URINALYSIS NONAUTO W/SCOPE: CPT

## 2022-03-02 PROCEDURE — 0502F PR SUBSEQUENT PRENATAL CARE: ICD-10-PCS | Mod: ,,, | Performed by: OBSTETRICS & GYNECOLOGY

## 2022-03-02 PROCEDURE — 99284 EMERGENCY DEPT VISIT MOD MDM: CPT | Mod: 25,,, | Performed by: OBSTETRICS & GYNECOLOGY

## 2022-03-02 PROCEDURE — 59025 PR FETAL 2N-STRESS TEST: ICD-10-PCS | Mod: 26,,, | Performed by: OBSTETRICS & GYNECOLOGY

## 2022-03-02 NOTE — TELEPHONE ENCOUNTER
----- Message from Anselmo Earl sent at 3/2/2022 10:15 AM CST -----  PLEASE CALL 35 WKS OB PT HAVING ABN PAINS [phone]

## 2022-03-02 NOTE — TELEPHONE ENCOUNTER
----- Message from Ashlee Gauthier sent at 3/2/2022 10:08 AM CST -----  Contact: [phone]  Pt is asking for Barb to please give her a return call she states she is having lots of pressure and she states she feels like she needs to push

## 2022-03-02 NOTE — ED PROVIDER NOTES
Encounter Date: 3/2/2022       History     Chief Complaint   Patient presents with    Abdominal Pain     Blood pressure check       HPI   Barbie Kumar is a 31 y.o. [id number] at 34w6d presents complaining of abdominal pain and lower abdominal pressure. She has had abdominal pressure for many weeks, but reports symptoms worsened overnight. Reports very irregular contractions, has not had any contractions since 9am.      This IUP is complicated by shortened cervix (on prometrium), fetal hypoplastic nasal bone, undesired fertility (consents signed 22), pelvic pain (sees PT).     Patient reports contractions, denies vaginal bleeding, denies LOF.   Fetal Movement: normal.       Review of patient's allergies indicates:   Allergen Reactions    Phenergan vc [promethazine-phenylephrine] Anxiety    Percocet [oxycodone-acetaminophen] Anxiety     Past Medical History:   Diagnosis Date    Abnormal Pap smear     Arrhythmia     Condyloma     Tuberculosis      Past Surgical History:   Procedure Laterality Date    NO PAST SURGERIES       Family History   Problem Relation Age of Onset    Hypertension Paternal Grandmother     Diabetes Paternal Grandmother     Hypertension Father     Diabetes Mother     Hypertension Maternal Grandmother     Diabetes Maternal Grandmother     Hypertension Maternal Grandfather     Diabetes Maternal Grandfather     Hypertension Paternal Grandfather     Diabetes Paternal Grandfather     Ovarian cancer Neg Hx     Colon cancer Neg Hx     Breast cancer Neg Hx     Stroke Neg Hx      Social History     Tobacco Use    Smoking status: Never Smoker    Smokeless tobacco: Never Used   Substance Use Topics    Alcohol use: No     Alcohol/week: 0.0 standard drinks    Drug use: No     Review of Systems   Constitutional: Negative for chills and fever.   HENT: Negative for sore throat.    Eyes: Negative for visual disturbance.   Respiratory: Negative for cough and shortness of breath.     Cardiovascular: Negative for chest pain.   Gastrointestinal: Positive for abdominal pain. Negative for nausea.   Genitourinary: Negative for dysuria, frequency, hematuria, vaginal bleeding and vaginal discharge.   Musculoskeletal: Negative for back pain.   Skin: Negative for rash.   Neurological: Negative for weakness and headaches.   Hematological: Does not bruise/bleed easily.       Physical Exam     Initial Vitals   BP Pulse Resp Temp SpO2   03/02/22 1242 03/02/22 1242 03/02/22 1242 03/02/22 1242 03/02/22 1245   112/68 100 20 97.9 °F (36.6 °C) 99 %      MAP       --                Physical Exam    Constitutional: She appears well-developed and well-nourished. No distress.   HENT:   Head: Normocephalic and atraumatic.   Eyes: EOM are normal.   Cardiovascular: Normal rate.   Pulmonary/Chest: No respiratory distress.   Abdominal: Abdomen is soft. There is no abdominal tenderness. There is no rebound and no guarding.   Musculoskeletal:         General: No edema.     Neurological: She is alert and oriented to person, place, and time.   Skin: Skin is warm and dry.   Psychiatric: She has a normal mood and affect. Thought content normal.     OB LABOR EXAM:   Pre-Term Labor: No.   Membranes ruptured: No.   Method: Sterile vaginal exam per MD.   Vaginal Bleeding: none present.     Dilatation: 0.   Station: -3.   Effacement: 50%.   Amniotic Fluid Color: no fluid.   Amniotic Fluid Amount: none noted.   Comments: SVE: ft/50/-3       ED Course   Obtain Fetal nonstress test (NST)    Date/Time: 3/2/2022 1:43 PM  Performed by: June Decker MD  Authorized by: Princess Walter MD     Nonstress Test:     Variability:  6-25 BPM    Decelerations:  None    Accelerations:  15 bpm    Acoustic Stimulator: No      Baseline:  135    Uterine Irritability: No      Contractions:  Not present  Biophysical Profile:     Nonstress Test Interpretation: reactive      Overall Impression:  Reassuring  Post-procedure:     Patient tolerance:  Patient  tolerated the procedure well with no immediate complications      Labs Reviewed   URINALYSIS, REFLEX TO URINE CULTURE - Abnormal; Notable for the following components:       Result Value    Appearance, UA Hazy (*)     Leukocytes, UA 3+ (*)     All other components within normal limits    Narrative:     Specimen Source->Urine   URINALYSIS MICROSCOPIC - Abnormal; Notable for the following components:    WBC, UA 8 (*)     Yeast, UA Rare (*)     All other components within normal limits    Narrative:     Specimen Source->Urine   POCT URINALYSIS, DIPSTICK OR TABLET REAGENT, AUTOMATED, WITH MICROSCOP          Imaging Results    None          Medications - No data to display  Medical Decision Making:   ED Management:  - Afebrile, VSS  - NST RR  - TOCO: not myron   - SVE: ft/50/-3  - Udip: 2+ leuks, trace protein   - Patient requesting induction at 35 weeks. Discussed that we do not have a medical indication for early induction at this time. Patient stated she is uncomfortable and would like to be done with pregnancy. Again informed patient we will not induce early at this time.   - Pains/changes of pregnancy discussed with patient. Encouraged increased fluid intake, tylenol, belly support bands, etc  - Patient stable for discharge at this time  - ED return precautions given  - She voiced understanding and is in agreement with the plan            Attending Attestation:   Physician Attestation Statement for Resident:  As the supervising MD   Physician Attestation Statement: I have personally seen and examined this patient.   I agree with the above history. -:   As the supervising MD I agree with the above PE.    As the supervising MD I agree with the above treatment, course, plan, and disposition.  I was personally present during the critical portions of the procedure(s) performed by the resident and was immediately available in the ED to provide services and assistance as needed during the entire procedure.                          Clinical Impression:   Final diagnoses:  [Z3A.34] 34 weeks gestation of pregnancy  [R10.9] Abdominal pain, unspecified abdominal location (Primary)          ED Disposition Condition    Discharge Stable        ED Prescriptions     None        Follow-up Information    None          June Decker MD  OBGYN, PGY-1       June Decker MD  Resident  03/02/22 1410       Jordyn Espionza MD  03/03/22 3734

## 2022-03-02 NOTE — DISCHARGE INSTRUCTIONS
Keep previously scheduled Clinic appointment.     Return or call if you have any of the following symptoms: blurred vision, headache, vaginal bleeding, nausea/ vomiting, leaking of fluid, contractions that are 4-5 in one hour (before 36 weeks), decreased fetal movements ( 10 kicks in 2 hours), headache not relieved by Tylenol, or temp of 100.4 and greater.  Begin doing fetal kick counts, (at least 10 movements in 2 hours) starting at 28 weeks gestation. Term Labor: We want your contractions 5 min apart for 2 hours.    Any questions or concerns call Clinic or  desk for assistance.     For any questions or concerns call your Clinic [phone]  or [facility], [place] [facility] [phone].

## 2022-03-02 NOTE — PROGRESS NOTES
Good FM.  Reports significant increase in CTX last night, now much less.  Denies bleeding.  Feels increased pelvic pressure.  Seen on L&D this morning.  Cx: closed / -4.  Continue nightly progesterone through 36th week.  We reviewed in general indications for inductions and that elective inductions are not performed prior to 39 weeks.  Continue with PT and massage.  Precautions reviewed.  Return 1 week.  [facility] bid.

## 2022-03-02 NOTE — TELEPHONE ENCOUNTER
Patient complains of lower back pain 10/10 and abdominal pain 8/10 pain level for two days. Patient states she has tried

## 2022-03-03 ENCOUNTER — CLINICAL SUPPORT (OUTPATIENT)
Dept: REHABILITATION | Facility: OTHER | Age: 32
End: 2022-03-03
Payer: MEDICAID

## 2022-03-03 DIAGNOSIS — M54.50 PREGNANCY RELATED LOW BACK PAIN, ANTEPARTUM: ICD-10-CM

## 2022-03-03 DIAGNOSIS — M25.559 PREGNANCY RELATED HIP PAIN, ANTEPARTUM: Primary | ICD-10-CM

## 2022-03-03 DIAGNOSIS — O26.899 PREGNANCY RELATED HIP PAIN, ANTEPARTUM: Primary | ICD-10-CM

## 2022-03-03 DIAGNOSIS — O26.899 PREGNANCY RELATED LOW BACK PAIN, ANTEPARTUM: ICD-10-CM

## 2022-03-03 PROCEDURE — 97110 THERAPEUTIC EXERCISES: CPT | Mod: PN

## 2022-03-03 NOTE — PROGRESS NOTES
"  [facility]   Physical Therapy Treatment Note     Name: Barbie Kumar  Clinic Number: [id number]    Therapy Diagnosis:   Encounter Diagnoses   Name Primary?    Pregnancy related hip pain, antepartum Yes    Pregnancy related low back pain, antepartum      Physician: Milton Sanchez MD    Visit Date: 3/3/2022    Physician Orders: PT Eval and Treat   Medical Diagnosis from Referral: O26.892,M25.559 (ICD-10-CM) - Pregnancy related hip pain in second trimester, antepartum  Evaluation Date: 11/24/2021  Authorization Period Expiration: 9/28/2022  Plan of Care Expiration: 4/12/2022  Progress Note Due: 3/15/2022  Visit # / Visits authorized: 10/ 20 (13 total visits)   FOTO: #1 on 10/14/2021, 11/24/2021, 12/30/2021, 1/19/2022     PTA Visit #: 0/5     Precautions: Standard, gestation 35 weeks as of 3/3/2022 (ANTIONETTE is April 2022)     Time In: 10:30 am  Time out: 11:15 am  Total Billable Time: 45 minutes    SUBJECTIVE     Pt reports: recent acute flare up of back pain > abdominal pain. Per pt, baby is now head down but not ready for delivery. Pt reports difficulty sleeping, getting in/out of bed, chair, the car.     She was compliant with home exercise program.  Response to previous treatment: no adverse response reported  Functional change: none    Pain: 10/10  Location: glutes    OBJECTIVE     Updated 2/15/2022    Treatment     Barbie received the treatments listed below:      therapeutic exercises to develop strength, ROM, flexibility and core stabilization for 10 minutes including:  Seated glute sets 2x10 5"  Hip adduction 2x10 5"  Hip abduction BTB 2x10  butterfly stretch 2 min  seated HS stretch 2x30"  seated piriformis stretch 2x30"  seated sciatic nerve glides 10xB  Standing self STM with LAX ball to glutes, lumbar paraspinals x 3' with pt Edu    Not today:  Seated pelvic tilts 1 x 10  standing trunk flexion 1 x 10  Qped laxmi pose stretch (knee position to tolerance) 3 x 20"  Qped tail " "wags 1 x 10  Qped cat/cow 1 x 10 - sequence with exhalation    manual therapy techniques: Soft tissue Mobilization were applied to the: lumbar spine and glutes for 35 minutes, including:  Foam rolling glutes to alleviate tension surrounding sciatic nerve  STM to lumbar paraspinals and upper glutes with pt positioned in prone in pregnancy pillow    Modalities: 10min x cold pack to low back and glutes, performed simultaneous with supine therex      Patient Education and Home Exercises     Home Exercises Provided and Patient Education Provided     Education provided:   - no new education provided today    Written Home Exercises Provided: yes. Exercises were reviewed and Barbie was able to demonstrate them prior to the end of the session.  Barbie demonstrated good  understanding of the education provided. See EMR under Patient Instructions for exercises provided during therapy sessions    ASSESSMENT     Pt presents to clinic with increased back > abdominal pain today. Pt reports reduction of pain from 10/10 at beginning of session to 4/10 pain ('manageable") by the end of the session. Limited progress of TE due to c/o severe pain today.    Pt will continue to benefit from skilled PT focused on improving glute and TRA strength and decreasing tension in lumbar paraspinals so that she can complete ADLs without an increase in lumbar pain.    Barbie is progressing well towards her goals.   Pt prognosis is Good.     Pt will continue to benefit from skilled outpatient physical therapy to address the deficits listed in the problem list box on initial evaluation, provide pt/family education and to maximize pt's level of independence in the home and community environment.     Pt's spiritual, cultural and educational needs considered and pt agreeable to plan of care and goals.     Anticipated barriers to physical therapy: standard, pregnancy, works full time, also in school for bachelor's degree    Goals:   Short Term Goals (6 " Weeks):  1. Pt able to stand >=30 minutes with ADLs with <4/10 pain. (progressing, not met)  2. Pt able to walk >=30 minutes with household chores with <4/10 pain. (progressing, not met)  3.  Pt able to transfer in/out of chairs of various heights with <4/10 pain. (goal met 12/30/2021)  4. Pt to demonstrate improved functional ability with FOTO limitation <=68% disability. (goal met 1/19/2022)     Long Term Goals (12 Weeks):  1. Pt able to stand >=1 hour with with ADLs with <3/10 pain. (progressing, not met)  2. Pt able to walk >=1 hour with household chores with <3/10 pain. (progressing, not met)  3.  Pt able to squat/lift 5# from floor to waist with <3/10 pain. (progressing, not met)  4. Pt able to ascend/descend 1 flight of stairs, independently, 1 handrail, with <3/10 pain. (progressing, not met)  5. Pt able to sit >= 1 hour with CPU work with <3/10 pain. (progressing, not met)  6. Pt is independent with all bed mobility. (progressing, not met)  7. Pt will be independent with HEP and self management of symptoms. (progressing, not met)  8. Pt to demonstrate improved functional ability with FOTO limitation <=58% disability. (goal met 1/19/2022)    PLAN     Next visit: continue with manual therapy ZORAN Cochran, PT

## 2022-03-08 ENCOUNTER — CLINICAL SUPPORT (OUTPATIENT)
Dept: REHABILITATION | Facility: OTHER | Age: 32
End: 2022-03-08
Payer: MEDICAID

## 2022-03-08 DIAGNOSIS — M54.50 PREGNANCY RELATED LOW BACK PAIN, ANTEPARTUM: ICD-10-CM

## 2022-03-08 DIAGNOSIS — O26.899 PREGNANCY RELATED HIP PAIN, ANTEPARTUM: Primary | ICD-10-CM

## 2022-03-08 DIAGNOSIS — M25.559 PREGNANCY RELATED HIP PAIN, ANTEPARTUM: Primary | ICD-10-CM

## 2022-03-08 DIAGNOSIS — O26.899 PREGNANCY RELATED LOW BACK PAIN, ANTEPARTUM: ICD-10-CM

## 2022-03-08 PROCEDURE — 97110 THERAPEUTIC EXERCISES: CPT | Mod: PN

## 2022-03-08 PROCEDURE — 97140 MANUAL THERAPY 1/> REGIONS: CPT | Mod: PN

## 2022-03-08 NOTE — PROGRESS NOTES
"  [facility]   Physical Therapy Treatment Note     Name: Barbie Kumar  Clinic Number: [id number]    Therapy Diagnosis:   Encounter Diagnoses   Name Primary?    Pregnancy related hip pain, antepartum Yes    Pregnancy related low back pain, antepartum      Physician: Milton Sanchez MD    Visit Date: 3/8/2022    Physician Orders: PT Eval and Treat   Medical Diagnosis from Referral: O26.892,M25.559 (ICD-10-CM) - Pregnancy related hip pain in second trimester, antepartum  Evaluation Date: 11/24/2021  Authorization Period Expiration: 9/28/2022  Plan of Care Expiration: 4/12/2022  Progress Note Due: 3/15/2022  Visit # / Visits authorized: 10/ 20 (13 total visits)   FOTO: #1 on 10/14/2021, 11/24/2021, 12/30/2021, 1/19/2022     PTA Visit #: 0/5     Precautions: Standard, gestation 35 weeks as of 3/10/2022 (ANTIONETTE is April 2022)     Time In: 1:30 am  Time out: 2:15 am  Total Billable Time: 45 minutes    SUBJECTIVE     Pt reports: she has been having a hard time recently. 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  Response to previous treatment: no adverse response reported  Functional change: none    Pain: 10/10  Location: glutes    OBJECTIVE     Updated 2/15/2022    Treatment     Barbie received the treatments listed below:      therapeutic exercises to develop strength, ROM, flexibility and core stabilization for 15 minutes including:  Seated glute sets 2x10 5"  Hip adduction 2x10 5"  Hip abduction BTB 2x10  butterfly stretch 2 min  seated HS stretch 2x30"  seated piriformis stretch 2x30"  seated sciatic nerve glides 10xB  Standing self STM with LAX ball to glutes, lumbar paraspinals x 3' with pt Edu    Not today:  Seated pelvic tilts 1 x 10  standing trunk flexion 1 x 10  Qped laxmi pose stretch (knee position to tolerance) 3 x 20"  Qped tail wags 1 x 10  Qped cat/cow 1 x 10 - " sequence with exhalation    manual therapy techniques: Soft tissue Mobilization were applied to the: lumbar spine and glutes for 30 minutes, including:  Foam rolling glutes to alleviate tension surrounding sciatic nerve  STM to lumbar paraspinals and upper glutes with pt positioned in prone in pregnancy pillow    Modalities: 00min x cold pack to low back and glutes, performed simultaneous with supine therex -- pt declined today      Patient Education and Home Exercises     Home Exercises Provided and Patient Education Provided     Education provided:   - no new education provided today    Written Home Exercises Provided: yes. Exercises were reviewed and Barbie was able to demonstrate them prior to the end of the session.  Barbie demonstrated good  understanding of the education provided. See EMR under Patient Instructions for exercises provided during therapy sessions    ASSESSMENT     Pt presents with depressive attitude towards pregnancy due to continuous pain that limits her ability for ADL independence, as well as the lack of familial support at home. Discussed seeing  or therapist for prenatal depression and anxiety. Pt admits to seeing someone in the past for this and states that she will reach out to her provider for a therapy session.  Good tolerance with overall therex program today with pain reducing from 10/10 to 4/10. Encouraged intermittent activity and stretching to reduce sx occurrence.  Limited progress of TE due to c/o severe pain today.    Pt will continue to benefit from skilled PT focused on improving glute and TRA strength and decreasing tension in lumbar paraspinals so that she can complete ADLs without an increase in lumbar pain.    Barbie is progressing well towards her goals.   Pt prognosis is Good.     Pt will continue to benefit from skilled outpatient physical therapy to address the deficits listed in the problem list box on initial evaluation, provide pt/family education  and to maximize pt's level of independence in the home and community environment.     Pt's spiritual, cultural and educational needs considered and pt agreeable to plan of care and goals.     Anticipated barriers to physical therapy: standard, pregnancy, works full time, also in school for bachelor's degree    Goals:   Short Term Goals (6 Weeks):  1. Pt able to stand >=30 minutes with ADLs with <4/10 pain. (progressing, not met)  2. Pt able to walk >=30 minutes with household chores with <4/10 pain. (progressing, not met)  3.  Pt able to transfer in/out of chairs of various heights with <4/10 pain. (goal met 12/30/2021)  4. Pt to demonstrate improved functional ability with FOTO limitation <=68% disability. (goal met 1/19/2022)     Long Term Goals (12 Weeks):  1. Pt able to stand >=1 hour with with ADLs with <3/10 pain. (progressing, not met)  2. Pt able to walk >=1 hour with household chores with <3/10 pain. (progressing, not met)  3.  Pt able to squat/lift 5# from floor to waist with <3/10 pain. (progressing, not met)  4. Pt able to ascend/descend 1 flight of stairs, independently, 1 handrail, with <3/10 pain. (progressing, not met)  5. Pt able to sit >= 1 hour with CPU work with <3/10 pain. (progressing, not met)  6. Pt is independent with all bed mobility. (progressing, not met)  7. Pt will be independent with HEP and self management of symptoms. (progressing, not met)  8. Pt to demonstrate improved functional ability with FOTO limitation <=58% disability. (goal met 1/19/2022)    PLAN     Next visit: continue with manual therapy ZORAN Cochran, PT

## 2022-03-10 ENCOUNTER — TELEPHONE (OUTPATIENT)
Dept: OBSTETRICS AND GYNECOLOGY | Facility: CLINIC | Age: 32
End: 2022-03-10
Payer: OTHER GOVERNMENT

## 2022-03-10 ENCOUNTER — HOSPITAL ENCOUNTER (INPATIENT)
Facility: HOSPITAL | Age: 32
LOS: 1 days | Discharge: HOME | DRG: 832 | End: 2022-03-12
Attending: OBSTETRICS & GYNECOLOGY | Admitting: OBSTETRICS & GYNECOLOGY
Payer: OTHER GOVERNMENT

## 2022-03-10 DIAGNOSIS — Z3A.36 36 WEEKS GESTATION OF PREGNANCY: Primary | ICD-10-CM

## 2022-03-10 DIAGNOSIS — O09.293 H/O SHOULDER DYSTOCIA IN PRIOR PREGNANCY, CURRENTLY PREGNANT, THIRD TRIMESTER: ICD-10-CM

## 2022-03-10 DIAGNOSIS — O99.013 ANEMIA AFFECTING PREGNANCY IN THIRD TRIMESTER: ICD-10-CM

## 2022-03-10 DIAGNOSIS — O26.879 SHORT CERVIX AFFECTING PREGNANCY: ICD-10-CM

## 2022-03-10 DIAGNOSIS — O47.03 THREATENED PRETERM LABOR, THIRD TRIMESTER: ICD-10-CM

## 2022-03-10 LAB
ABO + RH BLD: NORMAL
ALBUMIN SERPL BCP-MCNC: 3 G/DL (ref 3.5–5.2)
ALP SERPL-CCNC: 128 U/L (ref 55–135)
ALT SERPL W/O P-5'-P-CCNC: 10 U/L (ref 10–44)
ANION GAP SERPL CALC-SCNC: 10 MMOL/L (ref 8–16)
AST SERPL-CCNC: 13 U/L (ref 10–40)
BASOPHILS # BLD AUTO: 0.04 K/UL (ref 0–0.2)
BASOPHILS NFR BLD: 0.3 % (ref 0–1.9)
BILIRUB SERPL-MCNC: 0.4 MG/DL (ref 0.1–1)
BLD GP AB SCN CELLS X3 SERPL QL: NORMAL
BUN SERPL-MCNC: 4 MG/DL (ref 6–20)
CALCIUM SERPL-MCNC: 8.8 MG/DL (ref 8.7–10.5)
CHLORIDE SERPL-SCNC: 106 MMOL/L (ref 95–110)
CO2 SERPL-SCNC: 21 MMOL/L (ref 23–29)
CREAT SERPL-MCNC: 0.6 MG/DL (ref 0.5–1.4)
DIFFERENTIAL METHOD: ABNORMAL
EOSINOPHIL # BLD AUTO: 0.2 K/UL (ref 0–0.5)
EOSINOPHIL NFR BLD: 1.4 % (ref 0–8)
ERYTHROCYTE [DISTWIDTH] IN BLOOD BY AUTOMATED COUNT: 14.6 % (ref 11.5–14.5)
EST. GFR  (AFRICAN AMERICAN): >60 ML/MIN/1.73 M^2
EST. GFR  (NON AFRICAN AMERICAN): >60 ML/MIN/1.73 M^2
GLUCOSE SERPL-MCNC: 109 MG/DL (ref 70–110)
HCT VFR BLD AUTO: 29.9 % (ref 37–48.5)
HGB BLD-MCNC: 9.6 G/DL (ref 12–16)
HIV1+2 IGG SERPL QL IA.RAPID: NORMAL
IMM GRANULOCYTES # BLD AUTO: 0.11 K/UL (ref 0–0.04)
IMM GRANULOCYTES NFR BLD AUTO: 0.8 % (ref 0–0.5)
LYMPHOCYTES # BLD AUTO: 3.5 K/UL (ref 1–4.8)
LYMPHOCYTES NFR BLD: 26.3 % (ref 18–48)
MCH RBC QN AUTO: 26.7 PG (ref 27–31)
MCHC RBC AUTO-ENTMCNC: 32.1 G/DL (ref 32–36)
MCV RBC AUTO: 83 FL (ref 82–98)
MONOCYTES # BLD AUTO: 1.2 K/UL (ref 0.3–1)
MONOCYTES NFR BLD: 9.2 % (ref 4–15)
NEUTROPHILS # BLD AUTO: 8.4 K/UL (ref 1.8–7.7)
NEUTROPHILS NFR BLD: 62 % (ref 38–73)
NRBC BLD-RTO: 0 /100 WBC
PLATELET # BLD AUTO: 265 K/UL (ref 150–450)
PMV BLD AUTO: 10 FL (ref 9.2–12.9)
POTASSIUM SERPL-SCNC: 3.1 MMOL/L (ref 3.5–5.1)
PROT SERPL-MCNC: 6.5 G/DL (ref 6–8.4)
RBC # BLD AUTO: 3.59 M/UL (ref 4–5.4)
SODIUM SERPL-SCNC: 137 MMOL/L (ref 136–145)
WBC # BLD AUTO: 13.48 K/UL (ref 3.9–12.7)

## 2022-03-10 PROCEDURE — 99211 OFF/OP EST MAY X REQ PHY/QHP: CPT | Mod: 25

## 2022-03-10 PROCEDURE — 85025 COMPLETE CBC W/AUTO DIFF WBC: CPT | Performed by: OBSTETRICS & GYNECOLOGY

## 2022-03-10 PROCEDURE — 25000003 PHARM REV CODE 250: Performed by: OBSTETRICS & GYNECOLOGY

## 2022-03-10 PROCEDURE — 96372 THER/PROPH/DIAG INJ SC/IM: CPT

## 2022-03-10 PROCEDURE — 99223 1ST HOSP IP/OBS HIGH 75: CPT | Mod: 25,,, | Performed by: OBSTETRICS & GYNECOLOGY

## 2022-03-10 PROCEDURE — 80053 COMPREHEN METABOLIC PANEL: CPT | Performed by: OBSTETRICS & GYNECOLOGY

## 2022-03-10 PROCEDURE — 86592 SYPHILIS TEST NON-TREP QUAL: CPT | Performed by: OBSTETRICS & GYNECOLOGY

## 2022-03-10 PROCEDURE — 86703 HIV-1/HIV-2 1 RESULT ANTBDY: CPT | Performed by: OBSTETRICS & GYNECOLOGY

## 2022-03-10 PROCEDURE — 59025 FETAL NON-STRESS TEST: CPT | Mod: 26,,, | Performed by: OBSTETRICS & GYNECOLOGY

## 2022-03-10 PROCEDURE — 63600175 PHARM REV CODE 636 W HCPCS: Performed by: OBSTETRICS & GYNECOLOGY

## 2022-03-10 PROCEDURE — 99223 PR INITIAL HOSPITAL CARE,LEVL III: ICD-10-PCS | Mod: 25,,, | Performed by: OBSTETRICS & GYNECOLOGY

## 2022-03-10 PROCEDURE — 59025 PR FETAL 2N-STRESS TEST: ICD-10-PCS | Mod: 26,,, | Performed by: OBSTETRICS & GYNECOLOGY

## 2022-03-10 PROCEDURE — 86901 BLOOD TYPING SEROLOGIC RH(D): CPT | Performed by: OBSTETRICS & GYNECOLOGY

## 2022-03-10 PROCEDURE — 59025 FETAL NON-STRESS TEST: CPT

## 2022-03-10 RX ORDER — SODIUM CHLORIDE, SODIUM LACTATE, POTASSIUM CHLORIDE, CALCIUM CHLORIDE 600; 310; 30; 20 MG/100ML; MG/100ML; MG/100ML; MG/100ML
INJECTION, SOLUTION INTRAVENOUS CONTINUOUS
Status: DISCONTINUED | OUTPATIENT
Start: 2022-03-10 | End: 2022-03-12 | Stop reason: HOSPADM

## 2022-03-10 RX ORDER — BUTORPHANOL TARTRATE 1 MG/ML
1 INJECTION INTRAMUSCULAR; INTRAVENOUS ONCE
Status: COMPLETED | OUTPATIENT
Start: 2022-03-10 | End: 2022-03-10

## 2022-03-10 RX ORDER — HYDROCORTISONE 25 MG/G
CREAM TOPICAL 2 TIMES DAILY
Qty: 20 G | Refills: 1 | Status: SHIPPED | OUTPATIENT
Start: 2022-03-10 | End: 2022-03-12

## 2022-03-10 RX ORDER — BETAMETHASONE SODIUM PHOSPHATE AND BETAMETHASONE ACETATE 3; 3 MG/ML; MG/ML
12 INJECTION, SUSPENSION INTRA-ARTICULAR; INTRALESIONAL; INTRAMUSCULAR; SOFT TISSUE EVERY 24 HOURS
Status: COMPLETED | OUTPATIENT
Start: 2022-03-10 | End: 2022-03-11

## 2022-03-10 RX ORDER — TERBUTALINE SULFATE 1 MG/ML
0.25 INJECTION SUBCUTANEOUS ONCE
Status: COMPLETED | OUTPATIENT
Start: 2022-03-10 | End: 2022-03-10

## 2022-03-10 RX ORDER — NITROFURANTOIN 25; 75 MG/1; MG/1
100 CAPSULE ORAL EVERY 12 HOURS
Status: DISCONTINUED | OUTPATIENT
Start: 2022-03-10 | End: 2022-03-12 | Stop reason: HOSPADM

## 2022-03-10 RX ADMIN — BETAMETHASONE SODIUM PHOSPHATE AND BETAMETHASONE ACETATE 12 MG: 3; 3 INJECTION, SUSPENSION INTRA-ARTICULAR; INTRALESIONAL; INTRAMUSCULAR at 02:03

## 2022-03-10 RX ADMIN — SODIUM CHLORIDE, SODIUM LACTATE, POTASSIUM CHLORIDE, AND CALCIUM CHLORIDE: .6; .31; .03; .02 INJECTION, SOLUTION INTRAVENOUS at 05:03

## 2022-03-10 RX ADMIN — SODIUM CHLORIDE, SODIUM LACTATE, POTASSIUM CHLORIDE, AND CALCIUM CHLORIDE 500 ML: .6; .31; .03; .02 INJECTION, SOLUTION INTRAVENOUS at 01:03

## 2022-03-10 RX ADMIN — TERBUTALINE SULFATE 0.25 MG: 1 INJECTION SUBCUTANEOUS at 06:03

## 2022-03-10 RX ADMIN — SODIUM CHLORIDE, SODIUM LACTATE, POTASSIUM CHLORIDE, AND CALCIUM CHLORIDE: .6; .31; .03; .02 INJECTION, SOLUTION INTRAVENOUS at 11:03

## 2022-03-10 RX ADMIN — BUTORPHANOL TARTRATE 1 MG: 1 INJECTION, SOLUTION INTRAMUSCULAR; INTRAVENOUS at 02:03

## 2022-03-10 RX ADMIN — SODIUM CHLORIDE, SODIUM LACTATE, POTASSIUM CHLORIDE, AND CALCIUM CHLORIDE: .6; .31; .03; .02 INJECTION, SOLUTION INTRAVENOUS at 08:03

## 2022-03-10 RX ADMIN — AMPICILLIN SODIUM 2 G: 2 INJECTION, POWDER, FOR SOLUTION INTRAMUSCULAR; INTRAVENOUS at 11:03

## 2022-03-10 RX ADMIN — NITROFURANTOIN (MONOHYDRATE/MACROCRYSTALS) 100 MG: 75; 25 CAPSULE ORAL at 07:03

## 2022-03-10 NOTE — PROVIDER PROGRESS NOTES - EMERGENCY DEPT.
Encounter Date: 3/2/2022    ED Physician Progress Notes        Physician Note:   Patient was briefly evaluated by me in the emergency department.  She states she was having contractions that were varying in length as well as flank apart.  She states they were very firm.  She states she is 36 weeks.  She states that she had been having a wet feeling in her underwear.  I did not do and internal vaginal examination digitally given this.  I did not feel that a sterile speculum examination was warranted as external examination did not reveal any evidence of obvious .  The patient's condition was deemed stable to go up to labor and delivery imp emergently for further evaluation of active labor.

## 2022-03-10 NOTE — TELEPHONE ENCOUNTER
----- Message from Bri Mane sent at 3/10/2022 12:32 PM CST -----  Type: Patient Call Back    Who called: self    What is the request in detail: PT IS IN LABOR AND IS HEADED TO THE HOSPITAL NOW ON [facility] THIS IS THE CLOSEST TO HER!     Can the clinic reply by [facility]?    Would the patient rather a call back or a response via [facility]?     Best call back number: [phone] (home)

## 2022-03-10 NOTE — NURSING
Dr. Wilson notified of patient arrival. SVE FT/50/posterior. Head is low, pt ctx q 2-5 min mod to palpation. FHR 140min variability. PT has hx of PTL and short cervix. Orders to obs and start IVF bolus

## 2022-03-10 NOTE — TELEPHONE ENCOUNTER
Barbie Kumar 14 hours ago (6:39 PM)           Did he put my prescription in the cream          Barbie Alexander 15 hours ago (5:02 PM)     DI      Dr Sanchez said if you are having significant pain you should go to the OB/ER today and not wait until tomorrow. Also, Dr Sanchez said unfortunately an induction can not be scheduled before 39 weeks without a medical indication protocol being met. I do not have anything at this time but I will check the schedule tomorrow morning when I come in for cancellations. Barb         Barbie Kumar 17 hours ago (3:06 PM)           Is there an open tomorrow for me to come in?          Barbie Kumar 17 hours ago (3:06 PM)            This is day two of my pain, I can't take it anymore, I can't move around cause it hurts, I feel like all labor and deliver is going to do is put the monitor on me, check my cervix and send me home, I'm tired of it, I need my son out of me, this is too much pain, I'm drinking water, staying hydrated, I'm eating, the things I was doing that would help is not helping anymore, I'm not getting better. Yes I know how far I've come it doesn't help with the pain I'm dealing with now its just me and barbara I can barely get her to school and back. I need to schedule an induction please I'll be 36 weeks tomorrow I had barbara at 37 my baby will survive. It's depression         Barbie Kumar 19 hours ago (1:58 PM)           I'm having strong Round Ligament Pain, it's been over an hour, I've been in bed all day, the slightest movement makes the pain worst, it hurts to walk, and move side to side when laying down, it hurts to lay on my back. I need to know what does it mean?          Barbie Kumar 21 hours ago (11:35 AM)           I'll go with the cream         You  Barbie Kumar 21 hours ago (11:06 AM)     DI      You can only use one because it is the same medication. So if you are having more external you can try but if symptoms may be  both you can try the suppositories. Let me know. Barb

## 2022-03-10 NOTE — NURSING
Dr. Wilson notified of SVE 1/50/-3, pt requesting pain medications. MD to place orders for pain and steroids.

## 2022-03-10 NOTE — NURSING
Assessed patient for pain. Offered to call for pain medication, patient denied at this time would like to wait until next cervical check. Call bell in reach, instructed patient to call in she changes her mind and would like pain medication.

## 2022-03-11 ENCOUNTER — ANESTHESIA (OUTPATIENT)
Dept: OBSTETRICS AND GYNECOLOGY | Facility: HOSPITAL | Age: 32
End: 2022-03-11

## 2022-03-11 ENCOUNTER — ANESTHESIA EVENT (OUTPATIENT)
Dept: OBSTETRICS AND GYNECOLOGY | Facility: HOSPITAL | Age: 32
End: 2022-03-11

## 2022-03-11 LAB — RPR SER QL: NORMAL

## 2022-03-11 PROCEDURE — 99232 SBSQ HOSP IP/OBS MODERATE 35: CPT | Mod: 25,,, | Performed by: OBSTETRICS & GYNECOLOGY

## 2022-03-11 PROCEDURE — 87081 CULTURE SCREEN ONLY: CPT | Performed by: OBSTETRICS & GYNECOLOGY

## 2022-03-11 PROCEDURE — 59025 FETAL NON-STRESS TEST: CPT | Mod: 26,,, | Performed by: OBSTETRICS & GYNECOLOGY

## 2022-03-11 PROCEDURE — 63600175 PHARM REV CODE 636 W HCPCS: Performed by: OBSTETRICS & GYNECOLOGY

## 2022-03-11 PROCEDURE — 96372 THER/PROPH/DIAG INJ SC/IM: CPT

## 2022-03-11 PROCEDURE — 25000003 PHARM REV CODE 250: Performed by: OBSTETRICS & GYNECOLOGY

## 2022-03-11 PROCEDURE — 99232 PR SUBSEQUENT HOSPITAL CARE,LEVL II: ICD-10-PCS | Mod: 25,,, | Performed by: OBSTETRICS & GYNECOLOGY

## 2022-03-11 PROCEDURE — 59025 PR FETAL 2N-STRESS TEST: ICD-10-PCS | Mod: 26,,, | Performed by: OBSTETRICS & GYNECOLOGY

## 2022-03-11 PROCEDURE — 11000001 HC ACUTE MED/SURG PRIVATE ROOM

## 2022-03-11 RX ORDER — CALCIUM CARBONATE 200(500)MG
500 TABLET,CHEWABLE ORAL 3 TIMES DAILY PRN
Status: DISCONTINUED | OUTPATIENT
Start: 2022-03-11 | End: 2022-03-12 | Stop reason: HOSPADM

## 2022-03-11 RX ADMIN — BETAMETHASONE SODIUM PHOSPHATE AND BETAMETHASONE ACETATE 12 MG: 3; 3 INJECTION, SUSPENSION INTRA-ARTICULAR; INTRALESIONAL; INTRAMUSCULAR at 09:03

## 2022-03-11 RX ADMIN — AMPICILLIN 1 G: 1 INJECTION, POWDER, FOR SOLUTION INTRAMUSCULAR; INTRAVENOUS at 03:03

## 2022-03-11 RX ADMIN — AMPICILLIN 1 G: 1 INJECTION, POWDER, FOR SOLUTION INTRAMUSCULAR; INTRAVENOUS at 11:03

## 2022-03-11 RX ADMIN — CALCIUM CARBONATE (ANTACID) CHEW TAB 500 MG 500 MG: 500 CHEW TAB at 09:03

## 2022-03-11 RX ADMIN — AMPICILLIN 1 G: 1 INJECTION, POWDER, FOR SOLUTION INTRAMUSCULAR; INTRAVENOUS at 09:03

## 2022-03-11 RX ADMIN — NITROFURANTOIN (MONOHYDRATE/MACROCRYSTALS) 100 MG: 75; 25 CAPSULE ORAL at 09:03

## 2022-03-11 RX ADMIN — CALCIUM CARBONATE (ANTACID) CHEW TAB 500 MG 500 MG: 500 CHEW TAB at 11:03

## 2022-03-11 RX ADMIN — AMPICILLIN 1 G: 1 INJECTION, POWDER, FOR SOLUTION INTRAMUSCULAR; INTRAVENOUS at 01:03

## 2022-03-11 RX ADMIN — SODIUM CHLORIDE, SODIUM LACTATE, POTASSIUM CHLORIDE, AND CALCIUM CHLORIDE: .6; .31; .03; .02 INJECTION, SOLUTION INTRAVENOUS at 04:03

## 2022-03-11 RX ADMIN — CALCIUM CARBONATE (ANTACID) CHEW TAB 500 MG 500 MG: 500 CHEW TAB at 05:03

## 2022-03-11 RX ADMIN — SODIUM CHLORIDE, SODIUM LACTATE, POTASSIUM CHLORIDE, AND CALCIUM CHLORIDE: .6; .31; .03; .02 INJECTION, SOLUTION INTRAVENOUS at 05:03

## 2022-03-11 RX ADMIN — AMPICILLIN 1 G: 1 INJECTION, POWDER, FOR SOLUTION INTRAMUSCULAR; INTRAVENOUS at 07:03

## 2022-03-11 NOTE — NURSING
Dr. Wilson at pt bedside explaining the POC. Will keep pt overnight to observe ctx pattern. No acute distress noted, will continue to monitor.

## 2022-03-11 NOTE — NURSING
Dr. Wilson called. Update given on pt status. MD notified of pt ctx pattern. MD orders to start treatment for unknown GBS status w/ ampicillin. Orders received.

## 2022-03-11 NOTE — NURSING
Dr. Wilson called. Update given on pt status. MD notified of 2 min decel, baby recovering well. RN instructed to monitor strip closely. Will continue to monitor pt.

## 2022-03-11 NOTE — NURSING
"Pt refused progesterone. Patient stated "I do not want to take this."  PEYTON Hernandez RN explained medication indications. Pt still refused. Med placed in "return to pharmacy bin".  Refusal documented in MAR  "

## 2022-03-11 NOTE — NURSING
Dr. Wilson @ bs for evaluation, reviewed EFM strip, instructing pt on plan of care and reason for it (baby having 2 decreases or drops in the heart rate through the night and that he is not comfortable sending her home until further monitoring of the baby, he notices that her cervix is not dilated, and she is not feeling her contractions, and those things are good and will help in determining whether or not she can go home. Pt asked how often will she have to have vaginal exams, Dr. Wilson told her that she will be have SVE's at 1200 & 1600, also asked about if she can eat he informed her that she can have clear liquid diet for breakfast and maybe regular diet for lunch if no change and no deces, verbalized understanding of all above plan of care and agrees to it.

## 2022-03-11 NOTE — NURSING
Dr. Wilson called unit for pt update.   Orders given to obtain gbs swab; order for vaginal progesterone nightly.   Orders received.

## 2022-03-11 NOTE — NURSING
Dr. Wilson informed that pt. Is requesting to go home. She rates her pain 0/10. States that he will review the strip and assess the pt. And discuss plan of care based on those things.

## 2022-03-11 NOTE — H&P
[facility]    Obstetrics & Gynecology  History & Physical      Patient Name:  Barbie Kumar  MRN:  [id number]  Admission Date:  3/10/2022  Hospital Length of Stay:  0  Attending Physician:  Dougie Wilson MD    Date:  03/10/2022     Chief Complaint:  Contractions     History of Present Illness:      Barbie Kumar is a 31 y.o.  at 36w0d who presents to L&D complaining of contractions that started this earlier today.  Pt new to me while on call, prenatal care with Dr. Sanchez at [facility]  Pt reports active fetus and denies leakage of fluid.     Pregnancy complicated by:  Short cervix on nightly vaginal progesterone  H/o right shoulder dystocia with first pregnancy  Anemia, iron deficiency      Past Medical History:      H/o genital warts    Past Surgical History:     None     Medications:     Prenatal vitamins  Progesterone 200 mg vaginal qhs    Allergies:      Review of patient's allergies indicates:   Allergen Reactions    Phenergan vc [promethazine-phenylephrine] Anxiety    Percocet [oxycodone-acetaminophen] Anxiety     Obstetric History:       Para Term  AB Living   2 1 1     1   SAB IAB Ectopic Multiple Live Births         0 1      # Outcome Date GA Lbr Morgan/2nd Weight Sex Delivery Anes PTL Lv   2 Current            1 Term 10/18/15 38w3d / 00:56 3.118 kg (6 lb 14 oz) F Vag-Spont EPI N BIRGIT      Birth Comments: The pregnancy was complicated by condyloma, shortened cervix, and prolonged rupture of membranes. Delivery was complicated by PROM and decreased fetal heart rate necessitating vacuum assist. R Shoulder dystocia. Also with terminal meconium.     Gynecologic History:      H/o genital warts  Recent Pap normal     Social History:      Denies tobacco, alcohol or illicit drug use  Current partner is father of baby  Denies domestic abuse     Family History:       Noncontributory without genetic disease or birth defect    Review of Systems   Constitutional: Negative.     HENT: Negative.    Eyes: Negative.    Respiratory: Negative.    Cardiovascular: Negative.    Gastrointestinal: Positive for abdominal pain.   Genitourinary: Negative.    Musculoskeletal: Positive for back pain.   Skin: Negative.    Neurological: Negative.    Endo/Heme/Allergies: Negative.    Psychiatric/Behavioral: Negative.      Vitals:    Temp:  [97.7 °F (36.5 °C)-98.1 °F (36.7 °C)] 98.1 °F (36.7 °C)  Pulse:  [0-137] 118  Resp:  [18-19] 19  SpO2:  [83 %-99 %] 99 %  BP: ()/(54-71) 94/54    BP (!) 94/54   Pulse (!) 118   Temp 98.1 °F (36.7 °C) (Oral)   Resp 19   Ht 5' (1.524 m)   Wt 60.8 kg (134 lb)   LMP 2021   SpO2 99%   Breastfeeding No   BMI 26.17 kg/m²      Physical Exam:      GENERAL:  No acute distress.  Alert and oriented to person, place and time.  HEENT:  Normocephalic, atraumatic, anicteric, EOMI, moist mucus membranes.  Neck supple without masses.  LUNGS:  Clear normal respiratory effort  HEART:  Regular rate & rhythm  ABDOMEN:  Soft, non tender without any guarding, rigidity or rebound. PELVIC:  Normal female external genitalia without gross lesions. No gross vaginal lesions. Adequate pelvis. Cervix: FT/30%/-3, posterior, no bleeding or LOF.  EXTREMITIES:  Symmetric without cramping, claudication or edema LE. +2 distal pulses.  Full range of motion.  SKIN:  No rashes, good turgor & capillary refill.  NEUROLOGIC:  Grossly intact bilaterally.   PSYCH:  Mood & affect appropriate.       Laboratory:     Recent Labs   Lab 03/10/22  1526   WBC 13.48*   RBC 3.59*   HGB 9.6*   HCT 29.9*      MCV 83   MCH 26.7*   MCHC 32.1     ABO:  O POS    NST:     Category: 1  Tocometry: ctx q2-4 on admission, ctx are spacing out with IVF and terb    Assessment/Plan:     31 y.o.  at 36w0d with threaten  labor:    Admit for observation for threaten  labor  Continuous fetal monitoring  [place] 12 mg IM  q24h x 2 doses for reduction of  morbidity and mortality    Risks,  benefits, and alternatives to  steroids discussed.  Risk of  delivery discussed  Consult anesthesia  Continue supportive antepartum care  SCD's    Short cervix antepartum:    Continue nightly vaginal progesterone until 37 wks    H/o right shoulder dystocia with first pregnancy:    Discussed risk of shoulder dystocia with this pregnancy  Pt would like to attempt trial of labor    Anemia, iron deficiency:      Fe supplementation  Risk of PPH and blood transfusion discussed    GBS unknown:    Start antibx for GBS prophylaxis if labor progression  Obtain GBS cx    Abnormal UA:    Macrobid 100 mg bid    H/o genital warts:    Risk of vertical transmission discussed      Dougie Wilson MD

## 2022-03-11 NOTE — NURSING
Pt sitting up in bed. VSS,   3/2 UA results and MD recommendation for treatment w/ nitrofurantoin discussed w/ pt.  Ctx pattern via toco, FHT via US. Pulse ox on, BP cycling q30min.   Pt family member at bedside

## 2022-03-12 VITALS
HEART RATE: 86 BPM | WEIGHT: 134 LBS | DIASTOLIC BLOOD PRESSURE: 54 MMHG | TEMPERATURE: 99 F | OXYGEN SATURATION: 100 % | HEIGHT: 60 IN | BODY MASS INDEX: 26.31 KG/M2 | SYSTOLIC BLOOD PRESSURE: 96 MMHG | RESPIRATION RATE: 17 BRPM

## 2022-03-12 PROCEDURE — 99232 PR SUBSEQUENT HOSPITAL CARE,LEVL II: ICD-10-PCS | Mod: 25,,, | Performed by: OBSTETRICS & GYNECOLOGY

## 2022-03-12 PROCEDURE — 59025 FETAL NON-STRESS TEST: CPT | Mod: 26,,, | Performed by: OBSTETRICS & GYNECOLOGY

## 2022-03-12 PROCEDURE — 99232 SBSQ HOSP IP/OBS MODERATE 35: CPT | Mod: 25,,, | Performed by: OBSTETRICS & GYNECOLOGY

## 2022-03-12 PROCEDURE — 25000003 PHARM REV CODE 250: Performed by: OBSTETRICS & GYNECOLOGY

## 2022-03-12 PROCEDURE — 59025 PR FETAL 2N-STRESS TEST: ICD-10-PCS | Mod: 26,,, | Performed by: OBSTETRICS & GYNECOLOGY

## 2022-03-12 PROCEDURE — 63600175 PHARM REV CODE 636 W HCPCS: Performed by: OBSTETRICS & GYNECOLOGY

## 2022-03-12 RX ORDER — FERROUS GLUCONATE 324(38)MG
324 TABLET ORAL 2 TIMES DAILY
Qty: 90 TABLET | Refills: 1 | Status: SHIPPED | OUTPATIENT
Start: 2022-03-12

## 2022-03-12 RX ORDER — NITROFURANTOIN 25; 75 MG/1; MG/1
100 CAPSULE ORAL 2 TIMES DAILY
Qty: 14 CAPSULE | Refills: 0 | Status: SHIPPED | OUTPATIENT
Start: 2022-03-12 | End: 2022-03-14

## 2022-03-12 RX ADMIN — AMPICILLIN 1 G: 1 INJECTION, POWDER, FOR SOLUTION INTRAMUSCULAR; INTRAVENOUS at 03:03

## 2022-03-12 RX ADMIN — SODIUM CHLORIDE, SODIUM LACTATE, POTASSIUM CHLORIDE, AND CALCIUM CHLORIDE: .6; .31; .03; .02 INJECTION, SOLUTION INTRAVENOUS at 02:03

## 2022-03-12 RX ADMIN — NITROFURANTOIN (MONOHYDRATE/MACROCRYSTALS) 100 MG: 75; 25 CAPSULE ORAL at 09:03

## 2022-03-12 RX ADMIN — AMPICILLIN 1 G: 1 INJECTION, POWDER, FOR SOLUTION INTRAMUSCULAR; INTRAVENOUS at 12:03

## 2022-03-12 RX ADMIN — NITROFURANTOIN (MONOHYDRATE/MACROCRYSTALS) 100 MG: 75; 25 CAPSULE ORAL at 07:03

## 2022-03-12 RX ADMIN — AMPICILLIN 1 G: 1 INJECTION, POWDER, FOR SOLUTION INTRAMUSCULAR; INTRAVENOUS at 07:03

## 2022-03-12 RX ADMIN — CALCIUM CARBONATE (ANTACID) CHEW TAB 500 MG 500 MG: 500 CHEW TAB at 10:03

## 2022-03-12 RX ADMIN — SODIUM CHLORIDE, SODIUM LACTATE, POTASSIUM CHLORIDE, AND CALCIUM CHLORIDE: .6; .31; .03; .02 INJECTION, SOLUTION INTRAVENOUS at 12:03

## 2022-03-12 NOTE — NURSING
Resting comf.No c/o pain or discomfort.Abd soft and non tender to palpate.NST reactive..Report to .order noted.To ultrasound via w/c.

## 2022-03-12 NOTE — NURSING
1935 Dr. Wilson at pt bedside to explain POC to pt.     1939 MD orders to watch strip closely. Monitor for decels. If decels present, call MD. Orders received.

## 2022-03-12 NOTE — DISCHARGE INSTRUCTIONS
Home Undelivered Discharge Instructions    After Discharge Orders:    Future Appointments   Date Time Provider Department Center   3/14/2022  8:45 AM Milton Sanchez MD [facility] [place]   3/16/2022 10:00 AM Isabella Mcmillan, PT [facility] [place]   3/18/2022 11:00 AM Kristin Vidales, PT [facility] [place]   3/22/2022 11:15 AM Isabella Mcmillan, PT [facility] [place]   3/25/2022 10:00 AM Kristin Vidales, PT [facility] [place]       Call physician or midwife's office  for instructions.    Current Discharge Medication List        START taking these medications    Details   ferrous gluconate (FERGON) 324 MG tablet Take 1 tablet (324 mg total) by mouth 2 (two) times a day.  Qty: 90 tablet, Refills: 1           CONTINUE these medications which have NOT CHANGED    Details   prenatal vit/iron fum/folic ac (PRENATAL 1+1 ORAL) Take by mouth.      progesterone (PROMETRIUM) 200 MG capsule Place 1 capsule (200 mg total) vaginally nightly.  Qty: 30 capsule, Refills: 4           STOP taking these medications       hydrocortisone 2.5 % cream Comments:   Reason for Stopping:                       Diet:  normal diet as tolerated    Rest: normal activity as tolerated    Other instructions: Do kick counts once a day on your baby. Choose the time of day your baby is most active. Get in a comfortable lying or sitting position and time how long it takes to feel 10 kicks, twists, turns, swishes, or rolls. Call your physician or midwife if there have not been 10 kicks in 2 hours    Call physician or midwife, return to Labor and Delivery, call 911, or go to the nearest Emergency Room if: increased leakage or fluid, decreased fetal movement, persistent low back pain or cramping, bleeding from vaginal area, difficulty urinating, pain with urination, difficulty breathing, new calf pain, persistent headache, or vision change

## 2022-03-12 NOTE — PROGRESS NOTES
[facility]    Progress Note  Obstetrics & Gynecology    Patient Name:  Barbie Kumar  MRN:  [id number]  Admission Date:  3/10/2022  Hospital Length of Stay:  1  Attending Physician:  Dougie Wilson MD  Primary Care Provider:  Milton Sanchez MD    Date:  3/12/2022    Hospital Course:  IUP at 36w2d admitted for threaten  labor.  S/p BMZ x 2 doses on 3/10, 3/11.      Subjective:    Pt has no major complaints today  Contractions have spaced out  Pt does not feel ctx  No acute events overnight  Pt appears comfortable  Reports active fetus  Denies vaginal bleeding, leakage of fluid    Objective:    Temp:  [98.3 °F (36.8 °C)-98.6 °F (37 °C)] 98.6 °F (37 °C)  Pulse:  [0-153] 86  Resp:  [17] 17  SpO2:  [94 %-100 %] 100 %  BP: ()/(53-93) 96/54    GENERAL:  No acute distress. Alert and oriented x 3.   LUNGS:  Clear to auscultation bilaterally.   HEART:  Regular rate and rhythm with physiologic heart sounds.   ABDOMEN:  Soft, non-tender, no guarding, rigidity, or rebound.   EXTREMITIES:  No cramping, claudication or edema. +2 distal pulses, symmetric. SCD's in place.  +2 DTR's  PSYCH:  Mood and affect appropriate.  PELVIC:  Normal female external genitalia with no obvious lesions. Cervix 0.5cm/50%/-3, posterior, no bleeding or LOF, no change since admission.    NST:    Currently Cat 1, baseline 140's, moderate variability, positive accelerations, no decelerations  Ester:  Occasional contractions, pt does not feel them    BPP:  3/12/2022    FINDINGS:  The fetal biophysical profile score is 6/8 with 2/2 points for fetal breathing movement, fetal tone, and qualitative amniotic fluid volume, and 0/2 points for gross body movement.  Per the sonographer, one fetal kick is seen at the end of scan, not included in the cine views.  Fetal reactivity will be assessed in the obstetrics department.  The amniotic fluid index is 9.3 cm.  Low level echogenic debris is noted within the amniotic fluid.  The  heart rate is 129 beats per minute.  The placenta is anterior.  The fetus is cephalic in presentation.  Average gestational age: 35 week, 2 day.  ANTIONETTE: 2022.  Estimated Fetal weight: 2730 g.    Pt reports very active fetal mov't before and after BPP, fetal mov't visible on exam and audible on NST.      Assessment/Plan:     31 y.o.  at 36w2d with threaten  labor:    No active  labor  Plan for discharge today  Labor/preE precautions  Kick counts  Return to L&D if sxs return    Short cervix antepartum:    Continue nightly vaginal progesterone until 37 wks    H/o right shoulder dystocia with first pregnancy:    Discussed risk of shoulder dystocia with this pregnancy  Pt would like to attempt trial of labor    Anemia, iron deficiency:      Fe supplementation  Risk of PPH and blood transfusion discussed    GBS unknown:    Start antibx for GBS prophylaxis if labor progression  Obtain GBS cx    Abnormal UA:    Macrobid 100 mg bid    H/o genital warts:    Risk of vertical transmission discussed    Disposition:  Discharge home today, f/u with primary OBGYN next week or sooner prn      Dougie Wilson MD

## 2022-03-12 NOTE — NURSING
POC reviewed w/ pt. Pt expressed dissatisfaction w/ POC and MD ordered cont efm. Pt requests to speak w/ MD in AM.

## 2022-03-12 NOTE — PROGRESS NOTES
03/12/22 0246   [facility] Note - Strip   Strip Reviewed by [facility] Nurse? Yes   [facility] Note - Communication   [place] Nurse Communicated with Bedside Nurse Regarding: Fetal Status  (Pt sitting up, picking up maternal HR.)   [facility] Note - Notification   Nurse Notified? Yes  (Nurse at BS.)   Name of Nurse Spoke with Hilaria

## 2022-03-12 NOTE — PROGRESS NOTES
[facility]    Progress Note  Obstetrics & Gynecology    Patient Name:  Barbie Kumar  MRN:  [id number]  Admission Date:  3/10/2022  Hospital Length of Stay:  1  Attending Physician:  Dougie Wilson MD  Primary Care Provider:  Milton Sanchez MD    Date:  3/11/22    Principal Problem:  36 weeks gestation of pregnancy    Hospital Course:  IUP at 36w1d admitted for threaten  labor.  S/p BMZ x 2 doses.      Subjective:    Pt has no major complaints today  Still having ctx  No acute events overnight  Pt appears comfortable  Reports active fetus  Denies vaginal bleeding, leakage of fluid    Objective:    Temp:  [98.3 °F (36.8 °C)-98.6 °F (37 °C)] 98.6 °F (37 °C)  Pulse:  [0-153] 86  Resp:  [17] 17  SpO2:  [94 %-100 %] 100 %  BP: ()/(53-93) 96/54    GENERAL:  No acute distress. Alert and oriented x 3.   LUNGS:  Clear to auscultation bilaterally.   HEART:  Regular rate and rhythm with physiologic heart sounds.   ABDOMEN:  Soft, non-tender, no guarding, rigidity, or rebound.   EXTREMITIES:  No cramping, claudication or edema. +2 distal pulses, symmetric. SCD's in place.  +2 DTR's  PSYCH:  Mood and affect appropriate.  PELVIC:  Normal female external genitalia with no obvious lesion. Cervix 0.5cm/50%/-3, posterior, no bleeding or LOF    NST:    Currently Cat 1, baseline 130's, moderate variability, positive accelerations, no decelerations  [place]:  Irregular contractions    Assessment/Plan:     31 y.o.  at 36w1d with threaten  labor:    Continue observation for  labor  Continuous fetal monitoring  [place] x 2 doses for reduction of  morbidity and mortality    Risks, benefits, and alternatives to  steroids discussed  Risk of  delivery discussed  Continue supportive antepartum care  SCD's    Short cervix antepartum:    Continue nightly vaginal progesterone until 37 wks    H/o right shoulder dystocia with first pregnancy:    Discussed risk of  shoulder dystocia with this pregnancy  Pt would like to attempt trial of labor    Anemia, iron deficiency:      Fe supplementation  Risk of PPH and blood transfusion discussed    GBS unknown:    Start antibx for GBS prophylaxis if labor progression  Obtain GBS cx    Abnormal UA:    Macrobid 100 mg bid    H/o genital warts:    Risk of vertical transmission discussed      Dougie Wilson MD

## 2022-03-12 NOTE — NURSING
Dr. Wilson called unit, he reviewed EFM strip, , informed him that pt is still not feeling her contractions, SVE done @ 1545 L/TH/CL/hi/posterior. Continue plan of care as earlier discussed. Informed him that pt is on board with staying.

## 2022-03-12 NOTE — DISCHARGE SUMMARY
[facility]    Discharge Summary  Obstetrics & Gynecology    Patient Name:  Barbie Kumar  MRN:  [id number]  Admission Date:  3/10/2022  Discharge Date:  3/12/2022  Hospital Length of Stay:  1  Attending Physician:  Dougie Wilson MD  Discharging Physician:  Dougie Wilson MD  Primary Care Provider:  Milton Sanchez MD    Admitting Diagnosis:    Anna IUP at 36w0d  Threaten  labor   Short cervix antepartum  H/o right shoulder dystocia with first pregnancy  Anemia, iron deficiency  GBS unknown  Abnormal UA  H/o genital warts    Discharge Diagnosis:    Anna IUP at 36w3d  Threaten  labor   S/p BMZ x 2 doses   H/o right shoulder dystocia with first pregnancy  Anemia, iron deficiency  GBS negative  Abnormal UA  H/o genital warts    Brief Hospital Course:      See chart for complete details.  In brief, Barbie Kumar is 31 y.o.  at 36w0d who presented to L&D with threaten  labor.    Cervix in admission was 0.5cm/50%/-3, posterior, no bleeding or LOF.  S/p celestone x 2 doses.  Pt rec'd GBS prophylaxis.  Contractions gradually spaced out with supportive care.  Cervix remained unchanged prior to discharge.  Hospital course was uncomplicated.   Prior to discharge, pt has no major complaints today.  Vitals physiologic and stable.  Physical exam showed no acute finding.  Maternal and fetal status was overall reassuring.    Pertinent Labs or Studies:      Lab Results   Component Value Date    WBC 13.48 (H) 03/10/2022    HGB 9.6 (L) 03/10/2022    HCT 29.9 (L) 03/10/2022    MCV 83 03/10/2022     03/10/2022     Discharge Exam:      BP (!) 96/54   Pulse 86   Temp 98.6 °F (37 °C) (Oral)   Resp 17   Ht 5' (1.524 m)   Wt 60.8 kg (134 lb)   LMP 2021   SpO2 100%   Breastfeeding No   BMI 26.17 kg/m²     GENERAL:  No acute distress. Alert and oriented x 3.   HEENT:  Normocephalic, atraumatic, moist mucus membranes, neck supple.  LUNGS:  Clear to auscultation  bilaterally.   HEART:  Regular rate and rhythm with physiologic heart sounds.   ABDOMEN:  Soft, non-tender, no guarding, rigidity, or rebound.   EXTREMITIES:  No cramping, claudication or edema. Good skin turgor. +2 distal pulses, symmetric. Full range of motion.   NEUROLOGIC:  Grossly intact bilaterally.   PSYCH:  Mood and affect appropriate.   GENITOURINARY:  NFEG, cervix 0.5cm/50%/-3, posterior, no bleeding or LOF    Discharge Condition:  Stable      Discharge Disposition:  Home       Discharge Medications:      Continue prenatal vitamins  Vaginal progesterone  Macrobid  Fergon    Discharge Activites:  Pelvic rest, light activity    Discharge Diet:  Regular diet     Discharge Instructions:  Pregnancy care instructions and precautions where reviewed with pt.  Contact L&D for any concerns including but not limited to vaginal bleeding, leakage of fluid, contractions, decreased fetal movement, headaches, vision changes, abdominal pain, seizure activities, nausea and vomiting, inability to tolerate oral intake, dehydration, excessive weakness, fever >100.4 or abnormal vital signs, shortness of breath, chest pain, dizziness or feeling faint, pain or swelling in her extremities, abnormal bleeding/bruising, urinary problems, or any other health concerns.  Kick count instructions.     Follow-up:  Return to clinic 1 week or sooner as needed.  Return to L&D if symptoms worsen.        Dougie Wilson MD

## 2022-03-13 ENCOUNTER — PATIENT MESSAGE (OUTPATIENT)
Dept: OBSTETRICS AND GYNECOLOGY | Facility: CLINIC | Age: 32
End: 2022-03-13
Payer: OTHER GOVERNMENT

## 2022-03-13 LAB — BACTERIA SPEC AEROBE CULT: NORMAL

## 2022-03-14 ENCOUNTER — ROUTINE PRENATAL (OUTPATIENT)
Dept: OBSTETRICS AND GYNECOLOGY | Facility: CLINIC | Age: 32
End: 2022-03-14
Payer: OTHER GOVERNMENT

## 2022-03-14 ENCOUNTER — HOSPITAL ENCOUNTER (OUTPATIENT)
Dept: PERINATAL CARE | Facility: OTHER | Age: 32
Discharge: HOME | End: 2022-03-14
Attending: OBSTETRICS & GYNECOLOGY
Payer: OTHER GOVERNMENT

## 2022-03-14 VITALS — BODY MASS INDEX: 27.9 KG/M2 | WEIGHT: 142.88 LBS | DIASTOLIC BLOOD PRESSURE: 62 MMHG | SYSTOLIC BLOOD PRESSURE: 124 MMHG

## 2022-03-14 DIAGNOSIS — O36.8131 DECREASED FETAL MOVEMENTS IN THIRD TRIMESTER, FETUS 1 OF MULTIPLE GESTATION: ICD-10-CM

## 2022-03-14 DIAGNOSIS — O36.8131 DECREASED FETAL MOVEMENTS IN THIRD TRIMESTER, FETUS 1 OF MULTIPLE GESTATION: Primary | ICD-10-CM

## 2022-03-14 DIAGNOSIS — O36.8130 DECREASED FETAL MOVEMENTS IN THIRD TRIMESTER, SINGLE OR UNSPECIFIED FETUS: ICD-10-CM

## 2022-03-14 DIAGNOSIS — Z34.80 SUPERVISION OF OTHER NORMAL PREGNANCY, ANTEPARTUM: Primary | ICD-10-CM

## 2022-03-14 DIAGNOSIS — Z3A.36 36 WEEKS GESTATION OF PREGNANCY: ICD-10-CM

## 2022-03-14 PROCEDURE — 59025 FETAL NON-STRESS TEST: CPT

## 2022-03-14 PROCEDURE — 59025 FETAL NON-STRESS TEST: CPT | Mod: 26,,, | Performed by: OBSTETRICS & GYNECOLOGY

## 2022-03-14 PROCEDURE — 76815 PRENATAL TESTING - NST/AFI: ICD-10-PCS | Mod: 26,,, | Performed by: OBSTETRICS & GYNECOLOGY

## 2022-03-14 PROCEDURE — 99213 OFFICE O/P EST LOW 20 MIN: CPT | Mod: PBBFAC,25,PN | Performed by: OBSTETRICS & GYNECOLOGY

## 2022-03-14 PROCEDURE — 59025 PRENATAL TESTING - NST/AFI: ICD-10-PCS | Mod: 26,,, | Performed by: OBSTETRICS & GYNECOLOGY

## 2022-03-14 PROCEDURE — 76815 OB US LIMITED FETUS(S): CPT

## 2022-03-14 PROCEDURE — 0502F PR SUBSEQUENT PRENATAL CARE: ICD-10-PCS | Mod: ,,, | Performed by: OBSTETRICS & GYNECOLOGY

## 2022-03-14 PROCEDURE — 99999 PR PBB SHADOW E&M-EST. PATIENT-LVL III: ICD-10-PCS | Mod: PBBFAC,,, | Performed by: OBSTETRICS & GYNECOLOGY

## 2022-03-14 PROCEDURE — 76815 OB US LIMITED FETUS(S): CPT | Mod: 26,,, | Performed by: OBSTETRICS & GYNECOLOGY

## 2022-03-14 PROCEDURE — 99999 PR PBB SHADOW E&M-EST. PATIENT-LVL III: CPT | Mod: PBBFAC,,, | Performed by: OBSTETRICS & GYNECOLOGY

## 2022-03-14 PROCEDURE — 0502F SUBSEQUENT PRENATAL CARE: CPT | Mod: ,,, | Performed by: OBSTETRICS & GYNECOLOGY

## 2022-03-14 NOTE — PROGRESS NOTES
Reports decreased fetal movement over the past several days.  Admitted at [facility] 3/9 --> 3/13 for threatened PTL.  Received BMZ.  Today, reports feeling several CTX / hour and continued pelvic pressure / pain, especially with ambulation.  Cx: FT / soft / posterior.  To continue with PT.  To PNT now for fetal monitoring.  Labor precautions reviewed.  Return 1 week.  FMC bid emphasized.

## 2022-03-14 NOTE — TELEPHONE ENCOUNTER
----- Message from Astrid Matta sent at 3/14/2022  8:32 AM CDT -----  Contact: ZURI CASTANON [[id number]] @ [phone]  Patient will be running 15 late for her appt.

## 2022-03-15 ENCOUNTER — TELEPHONE (OUTPATIENT)
Dept: OBSTETRICS AND GYNECOLOGY | Facility: CLINIC | Age: 32
End: 2022-03-15
Payer: OTHER GOVERNMENT

## 2022-03-15 RX ORDER — HYDROCORTISONE 25 MG/G
CREAM TOPICAL 2 TIMES DAILY
Qty: 20 G | Refills: 1 | Status: SHIPPED | OUTPATIENT
Start: 2022-03-15 | End: 2022-04-02

## 2022-03-15 NOTE — TELEPHONE ENCOUNTER
Milton Sanchez MD    Obstetrics and Gynecology       Conversation  (Newest Message First)    Me     DI    3/15/22 4:51 PM  Note  See patient portal          Me     DI    3/15/22 4:51 PM  Note  ----- Message from Brooklynn Pena sent at 3/15/2022  4:27 PM CDT -----  Patient says she sent a message to Barb dotson she would like her to look at it.  Patient says she needs Dr. Geller to please send in the hemorrhoid medication ASAP.  Patient can be reached at [phone]

## 2022-03-15 NOTE — TELEPHONE ENCOUNTER
----- Message from Brooklynn Pena sent at 3/15/2022  4:27 PM CDT -----  Patient says she sent a message to Barb dotson she would like her to look at it.  Patient says she needs Dr. Geller to please send in the hemorrhoid medication ASAP.  Patient can be reached at [phone]

## 2022-03-16 ENCOUNTER — CLINICAL SUPPORT (OUTPATIENT)
Dept: REHABILITATION | Facility: OTHER | Age: 32
End: 2022-03-16
Payer: MEDICAID

## 2022-03-16 DIAGNOSIS — M54.50 PREGNANCY RELATED LOW BACK PAIN, ANTEPARTUM: ICD-10-CM

## 2022-03-16 DIAGNOSIS — O26.899 PREGNANCY RELATED HIP PAIN, ANTEPARTUM: Primary | ICD-10-CM

## 2022-03-16 DIAGNOSIS — M25.559 PREGNANCY RELATED HIP PAIN, ANTEPARTUM: Primary | ICD-10-CM

## 2022-03-16 DIAGNOSIS — O26.899 PREGNANCY RELATED LOW BACK PAIN, ANTEPARTUM: ICD-10-CM

## 2022-03-16 PROCEDURE — 97140 MANUAL THERAPY 1/> REGIONS: CPT | Mod: PN

## 2022-03-16 PROCEDURE — 97110 THERAPEUTIC EXERCISES: CPT | Mod: PN

## 2022-03-16 NOTE — PROGRESS NOTES
"  [facility]   Physical Therapy Treatment Note     Name: Barbie Kumar  Clinic Number: [id number]    Therapy Diagnosis:   Encounter Diagnoses   Name Primary?    Pregnancy related hip pain, antepartum Yes    Pregnancy related low back pain, antepartum      Physician: Milton Sanchez MD    Visit Date: 3/16/2022    Physician Orders: PT Eval and Treat   Medical Diagnosis from Referral: O26.892,M25.559 (ICD-10-CM) - Pregnancy related hip pain in second trimester, antepartum  Evaluation Date: 11/24/2021  Authorization Period Expiration: 9/28/2022  Plan of Care Expiration: 4/12/2022  Progress Note Due: 3/15/2022  Visit # / Visits authorized: 11/ 20 (14 total visits)   FOTO: #1 on 10/14/2021, 11/24/2021, 12/30/2021, 1/19/2022     PTA Visit #: 0/5     Precautions: Standard, gestation 35 weeks as of 3/21/2022 (ANTIONETTE is April 2022)     Time In: 10:00 am  Time out: 10:45 am  Total Billable Time: 45 minutes    SUBJECTIVE     Pt reports: continuing to feel depressed and ready for birth. Says that the therapy helps but pain returns a day later. Had a session with a mental health specialist and is feeling better.     She was compliant with home exercise program.  Response to previous treatment: no adverse response reported  Functional change: none    Pain: 10/10  Location: glutes    OBJECTIVE     Updated 2/15/2022    Treatment     Barbie received the treatments listed below:      therapeutic exercises to develop strength, ROM, flexibility and core stabilization for 15 minutes including:  Seated glute sets 2x10 5"  Hip adduction 2x10 5"  Hip abduction BTB 2x10  butterfly stretch 2 min  seated HS stretch 2x30"  seated piriformis stretch 2x30"  seated sciatic nerve glides 10xB  Standing self STM with LAX ball to glutes, lumbar paraspinals x 3' with pt Edu    Not today:  Seated pelvic tilts 1 x 10  standing trunk flexion 1 x 10  Qped laxmi pose stretch (knee position to tolerance) 3 x 20"  Qped tail " wags 1 x 10  Qped cat/cow 1 x 10 - sequence with exhalation    manual therapy techniques: Soft tissue Mobilization were applied to the: lumbar spine and glutes for 30 minutes, including:  Foam rolling glutes to alleviate tension surrounding sciatic nerve  STM to lumbar paraspinals and upper glutes with pt positioned in prone in pregnancy pillow    Modalities: 00min x cold pack to low back and glutes, performed simultaneous with supine therex -- pt declined today      Patient Education and Home Exercises     Home Exercises Provided and Patient Education Provided     Education provided:   - no new education provided today    Written Home Exercises Provided: yes. Exercises were reviewed and Barbie was able to demonstrate them prior to the end of the session.  Barbie demonstrated good  understanding of the education provided. See EMR under Patient Instructions for exercises provided during therapy sessions    ASSESSMENT     Marked reduction in pain following manual therapy today. Encouraged use of modalities prn for pain modulation. Also encouraged pt to continue seeing mental health professional to focus on positive aspects of pregnancy and family.    Barbie is progressing well towards her goals.   Pt prognosis is Good.     Pt will continue to benefit from skilled outpatient physical therapy to address the deficits listed in the problem list box on initial evaluation, provide pt/family education and to maximize pt's level of independence in the home and community environment.     Pt's spiritual, cultural and educational needs considered and pt agreeable to plan of care and goals.     Anticipated barriers to physical therapy: standard, pregnancy, works full time, also in school for bachelor's degree    Goals:   Short Term Goals (6 Weeks):  1. Pt able to stand >=30 minutes with ADLs with <4/10 pain. (progressing, not met)  2. Pt able to walk >=30 minutes with household chores with <4/10 pain. (progressing, not  met)  3.  Pt able to transfer in/out of chairs of various heights with <4/10 pain. (goal met 12/30/2021)  4. Pt to demonstrate improved functional ability with FOTO limitation <=68% disability. (goal met 1/19/2022)     Long Term Goals (12 Weeks):  1. Pt able to stand >=1 hour with with ADLs with <3/10 pain. (progressing, not met)  2. Pt able to walk >=1 hour with household chores with <3/10 pain. (progressing, not met)  3.  Pt able to squat/lift 5# from floor to waist with <3/10 pain. (progressing, not met)  4. Pt able to ascend/descend 1 flight of stairs, independently, 1 handrail, with <3/10 pain. (progressing, not met)  5. Pt able to sit >= 1 hour with CPU work with <3/10 pain. (progressing, not met)  6. Pt is independent with all bed mobility. (progressing, not met)  7. Pt will be independent with HEP and self management of symptoms. (progressing, not met)  8. Pt to demonstrate improved functional ability with FOTO limitation <=58% disability. (goal met 1/19/2022)    PLAN     Next visit: continue with manual therapy ZORAN Cochran, PT

## 2022-03-17 ENCOUNTER — TELEPHONE (OUTPATIENT)
Dept: OBSTETRICS AND GYNECOLOGY | Facility: CLINIC | Age: 32
End: 2022-03-17
Payer: OTHER GOVERNMENT

## 2022-03-17 DIAGNOSIS — Z34.80 SUPERVISION OF OTHER NORMAL PREGNANCY, ANTEPARTUM: Primary | ICD-10-CM

## 2022-03-18 ENCOUNTER — DOCUMENTATION ONLY (OUTPATIENT)
Dept: REHABILITATION | Facility: OTHER | Age: 32
End: 2022-03-18
Payer: OTHER GOVERNMENT

## 2022-03-18 NOTE — PROGRESS NOTES
Physical Therapy No Show Note       Patient was scheduled for physical therapy at [facility] for 3/18/2022. Pt failed to appear for appointment without prior notification for today.     Left voicemail for pt to check on her. Waiting for reply.    Kristin Vidales, PT, DPT  3/18/2022

## 2022-03-21 ENCOUNTER — TELEPHONE (OUTPATIENT)
Dept: OBSTETRICS AND GYNECOLOGY | Facility: CLINIC | Age: 32
End: 2022-03-21
Payer: OTHER GOVERNMENT

## 2022-03-21 NOTE — TELEPHONE ENCOUNTER
----- Message from Julieta Shira sent at 3/21/2022 10:25 AM CDT -----  Contact: ZURI CASTANON [[id number]]  Type: Call Back    Who called: ZURI CASTANON [[id number]]    What is the request in detail: Patient is requesting a call back from Barb. She would like to know if she will be seen this week since her induction is scheduled for next week. Please advise.     Can the clinic reply by [facility]? No    Would the patient rather a call back or a response via [facility]? Call back     Best call back number: [phone] (mobile)    Additional Information:

## 2022-03-22 ENCOUNTER — CLINICAL SUPPORT (OUTPATIENT)
Dept: REHABILITATION | Facility: OTHER | Age: 32
End: 2022-03-22
Payer: OTHER GOVERNMENT

## 2022-03-22 DIAGNOSIS — O26.899 PREGNANCY RELATED HIP PAIN, ANTEPARTUM: Primary | ICD-10-CM

## 2022-03-22 DIAGNOSIS — M25.559 PREGNANCY RELATED HIP PAIN, ANTEPARTUM: Primary | ICD-10-CM

## 2022-03-22 DIAGNOSIS — O26.899 PREGNANCY RELATED LOW BACK PAIN, ANTEPARTUM: ICD-10-CM

## 2022-03-22 DIAGNOSIS — M54.50 PREGNANCY RELATED LOW BACK PAIN, ANTEPARTUM: ICD-10-CM

## 2022-03-22 PROCEDURE — 97140 MANUAL THERAPY 1/> REGIONS: CPT | Mod: PN

## 2022-03-22 PROCEDURE — 97110 THERAPEUTIC EXERCISES: CPT | Mod: PN

## 2022-03-22 NOTE — PROGRESS NOTES
[facility]   Physical Therapy Treatment Note     Name: Barbie Kumar  [employer] Number: [id number]    Therapy Diagnosis:   Encounter Diagnoses   Name Primary?    Pregnancy related hip pain, antepartum Yes    Pregnancy related low back pain, antepartum      Physician: Milton Sanchez MD    Visit Date: 3/22/2022    Physician Orders: PT Eval and Treat   Medical Diagnosis from Referral: O26.892,M25.559 (ICD-10-CM) - Pregnancy related hip pain in second trimester, antepartum  Evaluation Date: 11/24/2021  Authorization Period Expiration: 9/28/2022  Plan of Care Expiration: 4/12/2022  Progress Note Due: 3/15/2022  Visit # / Visits authorized: 12/ 20 (15 total visits)   FOTO: #1 on 10/14/2021, 11/24/2021, 12/30/2021, 1/19/2022     PTA Visit #: 0/5     Precautions: Standard, gestation 38 weeks as of 3/23/2022 (ANTIONETTE is April 2022)     Time In: 11:15 am  Time out: 12:00 am  Total Billable Time: 45 minutes    SUBJECTIVE     Pt reports: mixed emotions today - happy that her induction is scheduled for next week, but sad that her husbands deployment has been prolonged by 3 months, which will cause him to miss the birth. she says that the baby has dropped low in her pelvis and she is not having the back and abdominal pain that she used to.      She was compliant with home exercise program.  Response to previous treatment: no adverse response reported  Functional change: none    Pain: 6/10  Location: glutes    OBJECTIVE     Updated 3/22/2022    Observation: visible lowering of belly bump today, which correlates to pt's report of baby dropping    Lower Extremity Strength  Right LE   Left LE     Hip flexion: 4+/5 Hip flexion: 4+/5   Hip External rotation:  3+/5 Hip External rotation: 3+/5   Hip Internal rotation    3+/5 Hip Internal rotation 3+/5   Knee Flexion 5/5 Knee Flexion 5/5   Knee Extension 5/5 Knee Extension 5/5         Treatment     Barbie received the treatments listed below:      therapeutic  "exercises to develop strength, ROM, flexibility and core stabilization for 15 minutes including:  Seated glute sets 2x10 5"  Hip adduction 2x10 5"  Hip abduction BTB 2x10  butterfly stretch 2 min  seated HS stretch 2x30"  seated piriformis stretch 2x30"  seated sciatic nerve glides 10xB  Standing self STM with LAX ball to glutes, lumbar paraspinals x 3' with pt Edu    Not today:  Seated pelvic tilts 1 x 10  standing trunk flexion 1 x 10  Qped laxmi pose stretch (knee position to tolerance) 3 x 20"  Qped tail wags 1 x 10  Qped cat/cow 1 x 10 - sequence with exhalation    manual therapy techniques: Soft tissue Mobilization were applied to the: lumbar spine and glutes for 30 minutes, including:  Foam rolling glutes to alleviate tension surrounding sciatic nerve  STM to lumbar paraspinals and upper glutes with pt positioned in prone in pregnancy pillow    Modalities: 00min x cold pack to low back and glutes, performed simultaneous with supine therex -- pt declined today      Patient Education and Home Exercises     Home Exercises Provided and Patient Education Provided     Education provided:   - no new education provided today    Written Home Exercises Provided: yes. Exercises were reviewed and Barbie was able to demonstrate them prior to the end of the session.  Barbie demonstrated good  understanding of the education provided. See EMR under Patient Instructions for exercises provided during therapy sessions    ASSESSMENT     Educated on birth positions, prepping for delivery by stocking up on groceries and baby supplies, and relying for assistance on friends and any family close by for help as  will not be home for birth. Pt verbalized understanding. Pt presents with marked reduction in muscle tone and pain today, indicating changes in baby position may have been affecting current complaints. Pt has one more visit before induction; pt has expressed during session that she would like to do PFPT once cleared " by physician.      Barbie is progressing well towards her goals.   Pt prognosis is Good.     Pt will continue to benefit from skilled outpatient physical therapy to address the deficits listed in the problem list box on initial evaluation, provide pt/family education and to maximize pt's level of independence in the home and community environment.     Pt's spiritual, cultural and educational needs considered and pt agreeable to plan of care and goals.     Anticipated barriers to physical therapy: standard, pregnancy, works full time, also in school for bachelor's degree    Goals:   Short Term Goals (6 Weeks):  1. Pt able to stand >=30 minutes with ADLs with <4/10 pain. (progressing, not met)  2. Pt able to walk >=30 minutes with household chores with <4/10 pain. (progressing, not met)  3.  Pt able to transfer in/out of chairs of various heights with <4/10 pain. (goal met 12/30/2021)  4. Pt to demonstrate improved functional ability with FOTO limitation <=68% disability. (goal met 1/19/2022)     Long Term Goals (12 Weeks):  1. Pt able to stand >=1 hour with with ADLs with <3/10 pain. (progressing, not met)  2. Pt able to walk >=1 hour with household chores with <3/10 pain. (progressing, not met)  3.  Pt able to squat/lift 5# from floor to waist with <3/10 pain. (progressing, not met)  4. Pt able to ascend/descend 1 flight of stairs, independently, 1 handrail, with <3/10 pain. (progressing, not met)  5. Pt able to sit >= 1 hour with CPU work with <3/10 pain. (progressing, not met)  6. Pt is independent with all bed mobility. (progressing, not met)  7. Pt will be independent with HEP and self management of symptoms. (progressing, not met)  8. Pt to demonstrate improved functional ability with FOTO limitation <=58% disability. (goal met 1/19/2022)    PLAN     Next visit: continue with manual therapy PRRAMA Cochran, PT

## 2022-03-25 ENCOUNTER — ROUTINE PRENATAL (OUTPATIENT)
Dept: OBSTETRICS AND GYNECOLOGY | Facility: CLINIC | Age: 32
End: 2022-03-25
Attending: OBSTETRICS & GYNECOLOGY
Payer: OTHER GOVERNMENT

## 2022-03-25 ENCOUNTER — CLINICAL SUPPORT (OUTPATIENT)
Dept: REHABILITATION | Facility: OTHER | Age: 32
End: 2022-03-25
Payer: MEDICAID

## 2022-03-25 ENCOUNTER — HOSPITAL ENCOUNTER (OUTPATIENT)
Dept: PERINATAL CARE | Facility: OTHER | Age: 32
Discharge: HOME | End: 2022-03-25
Attending: OBSTETRICS & GYNECOLOGY
Payer: OTHER GOVERNMENT

## 2022-03-25 VITALS — WEIGHT: 141.31 LBS | DIASTOLIC BLOOD PRESSURE: 62 MMHG | BODY MASS INDEX: 27.6 KG/M2 | SYSTOLIC BLOOD PRESSURE: 122 MMHG

## 2022-03-25 DIAGNOSIS — O36.8131 DECREASED FETAL MOVEMENTS IN THIRD TRIMESTER, FETUS 1 OF MULTIPLE GESTATION: ICD-10-CM

## 2022-03-25 DIAGNOSIS — O36.8130 DECREASED FETAL MOVEMENTS IN THIRD TRIMESTER, SINGLE OR UNSPECIFIED FETUS: ICD-10-CM

## 2022-03-25 DIAGNOSIS — Z34.80 SUPERVISION OF OTHER NORMAL PREGNANCY, ANTEPARTUM: Primary | ICD-10-CM

## 2022-03-25 DIAGNOSIS — M25.559 PREGNANCY RELATED HIP PAIN, ANTEPARTUM: Primary | ICD-10-CM

## 2022-03-25 DIAGNOSIS — M54.50 PREGNANCY RELATED LOW BACK PAIN, ANTEPARTUM: ICD-10-CM

## 2022-03-25 DIAGNOSIS — Z3A.38 PREGNANCY WITH 38 COMPLETED WEEKS GESTATION: ICD-10-CM

## 2022-03-25 DIAGNOSIS — O26.899 PREGNANCY RELATED LOW BACK PAIN, ANTEPARTUM: ICD-10-CM

## 2022-03-25 DIAGNOSIS — O26.899 PREGNANCY RELATED HIP PAIN, ANTEPARTUM: Primary | ICD-10-CM

## 2022-03-25 PROCEDURE — 97110 THERAPEUTIC EXERCISES: CPT | Mod: PN

## 2022-03-25 PROCEDURE — 97530 THERAPEUTIC ACTIVITIES: CPT | Mod: PN

## 2022-03-25 PROCEDURE — 0502F PR SUBSEQUENT PRENATAL CARE: ICD-10-PCS | Mod: ,,, | Performed by: OBSTETRICS & GYNECOLOGY

## 2022-03-25 PROCEDURE — 99212 OFFICE O/P EST SF 10 MIN: CPT | Mod: PBBFAC | Performed by: OBSTETRICS & GYNECOLOGY

## 2022-03-25 PROCEDURE — 76818 FETAL BIOPHYS PROFILE W/NST: CPT

## 2022-03-25 PROCEDURE — 0502F SUBSEQUENT PRENATAL CARE: CPT | Mod: ,,, | Performed by: OBSTETRICS & GYNECOLOGY

## 2022-03-25 PROCEDURE — 97140 MANUAL THERAPY 1/> REGIONS: CPT | Mod: PN

## 2022-03-25 PROCEDURE — 76818 PRENATAL TESTING - NST/AFI: ICD-10-PCS | Mod: 26,,, | Performed by: OBSTETRICS & GYNECOLOGY

## 2022-03-25 PROCEDURE — 99999 PR PBB SHADOW E&M-EST. PATIENT-LVL II: ICD-10-PCS | Mod: PBBFAC,,, | Performed by: OBSTETRICS & GYNECOLOGY

## 2022-03-25 PROCEDURE — 99999 PR PBB SHADOW E&M-EST. PATIENT-LVL II: CPT | Mod: PBBFAC,,, | Performed by: OBSTETRICS & GYNECOLOGY

## 2022-03-25 PROCEDURE — 76818 FETAL BIOPHYS PROFILE W/NST: CPT | Mod: 26,,, | Performed by: OBSTETRICS & GYNECOLOGY

## 2022-03-25 NOTE — PROGRESS NOTES
"  [facility]   Physical Therapy Treatment Note     Name: Barbie Kumar  Clinic Number: [id number]    Therapy Diagnosis:   Encounter Diagnoses   Name Primary?    Pregnancy related hip pain, antepartum Yes    Pregnancy related low back pain, antepartum      Physician: Milton Sanchez MD    Visit Date: 3/25/2022    Physician Orders: PT Eval and Treat   Medical Diagnosis from Referral: O26.892,M25.559 (ICD-10-CM) - Pregnancy related hip pain in second trimester, antepartum  Evaluation Date: 11/24/2021  Authorization Period Expiration: 9/28/2022  Plan of Care Expiration: 4/12/2022  Progress Note Due: 3/15/2022  Visit # / Visits authorized: 14/ 20 (18 total visits)   FOTO: #1 on 10/14/2021, 11/24/2021, 12/30/2021, 1/19/2022     PTA Visit #: 0/5     Precautions: Standard, gestation 38 weeks as of 3/25/2022 (ANTIONETTE is April 2022)     Time In: 10:00 am  Time out: 11:00 am  Total Billable Time: 60 minutes    SUBJECTIVE     Pt reports: Last night she was having some cramping and soreness in her inner thighs. She is having low back and glute pain today. On her way here she had a pain that shot up her back.     She was compliant with home exercise program.  Response to previous treatment: no adverse response reported  Functional change: none    Pain: 6/10  Location: glutes and low back    OBJECTIVE     Updated last session.    Treatment     Barbie received the treatments listed below:      therapeutic exercises to develop strength, ROM, flexibility and core stabilization for 15 minutes including:  Seated glute sets 2x10 5"  seated HS stretch 2x30"  seated piriformis stretch 2x30"  seated sciatic nerve glides 10xB    Not today:  Hip adduction 2x10 5"  Hip abduction BTB 2x10  butterfly stretch 2 min  Seated pelvic tilts 1 x 10  standing trunk flexion 1 x 10  Qped laxmi pose stretch (knee position to tolerance) 3 x 20"  Qped tail wags 1 x 10  Qped cat/cow 1 x 10 - sequence with exhalation    manual " therapy techniques: Soft tissue Mobilization were applied to the: lumbar spine and glutes for 30 minutes, including:  Foam rolling glutes to alleviate tension surrounding sciatic nerve  STM to lumbar paraspinals and upper glutes with pt positioned in prone in pregnancy pillow    Modalities: 00min x cold pack to low back and glutes, performed simultaneous with supine therex -- pt declined today    Barbie participated in dynamic functional therapeutic activities to improve functional performance for 15  minutes, including:  Reviewed proper defecation posture and to perform perineal massage 6 weeks post partum      Patient Education and Home Exercises     Home Exercises Provided and Patient Education Provided     Education provided:   - no new education provided today    Written Home Exercises Provided: yes. Exercises were reviewed and Barbie was able to demonstrate them prior to the end of the session.  Barbie demonstrated good  understanding of the education provided. See EMR under Patient Instructions for exercises provided during therapy sessions    ASSESSMENT     Pt presents to PT with glute and lumbar pain. Pt is scheduled to be induced on 4/1/2022 so she will be d/c from PT at this time. Pt did not meet all goals set at evaluation secondary to increasing mass from pregnancy. Pt has decreased pain since baby dropped lower in pelvis, however, she continues to have intermittent pain. Pt was educated on importance of starting stool softeners and using proper defecation posture to decrease pressure on PF post-partum. Pt will be d/c from PT with HEP at this time.      Barbie is progressing well towards her goals.   Pt prognosis is Good.     Pt will continue to benefit from skilled outpatient physical therapy to address the deficits listed in the problem list box on initial evaluation, provide pt/family education and to maximize pt's level of independence in the home and community environment.     Pt's spiritual,  cultural and educational needs considered and pt agreeable to plan of care and goals.     Anticipated barriers to physical therapy: standard, pregnancy, works full time, also in school for bachelor's degree    Goals:   Short Term Goals (6 Weeks):  1. Pt able to stand >=30 minutes with ADLs with <4/10 pain. (not met)  2. Pt able to walk >=30 minutes with household chores with <4/10 pain. (not met)  3.  Pt able to transfer in/out of chairs of various heights with <4/10 pain. (goal met 12/30/2021)  4. Pt to demonstrate improved functional ability with FOTO limitation <=68% disability. (goal met 1/19/2022)     Long Term Goals (12 Weeks):  1. Pt able to stand >=1 hour with with ADLs with <3/10 pain. (not met)  2. Pt able to walk >=1 hour with household chores with <3/10 pain. (not met)  3.  Pt able to squat/lift 5# from floor to waist with <3/10 pain. (not met)  4. Pt able to ascend/descend 1 flight of stairs, independently, 1 handrail, with <3/10 pain. (goal met 3/25/2022)  5. Pt able to sit >= 1 hour with CPU work with <3/10 pain. (not met)  6. Pt is independent with all bed mobility. (goal met 3/25/2022)  7. Pt will be independent with HEP and self management of symptoms. (progressing, not met)  8. Pt to demonstrate improved functional ability with FOTO limitation <=58% disability. (goal met 1/19/2022)    PLAN     Pt to be d/c at this time due to induction date being set on 4/1/2022. See POC for d/c documentation.    Kristin Vidales, PT

## 2022-03-25 NOTE — PROGRESS NOTES
Reports +FM, but decreased over the past 2 days.  Feels pressure, occasional CTX.  No bleeding.  Cx: FT/ 50/ posterior.  Scheduled for induction 4/1/22 at 5am - discussed induction process and fluidity of scheduled time.  To PNT now for NTS / SHAKILA.  Labor precautions reviewed. [facility] bid.

## 2022-03-25 NOTE — PLAN OF CARE
[facility]  Physical Therapy Discharge Note    Name: Barbie Kumar  [facility] Number: [id number]    Therapy Diagnosis:   Encounter Diagnoses   Name Primary?    Pregnancy related hip pain, antepartum Yes    Pregnancy related low back pain, antepartum      Physician: Milton Sanchez MD    Physician Orders: PT Eval and Treat   Medical Diagnosis from Referral: O26.892,M25.559 (ICD-10-CM) - Pregnancy related hip pain in second trimester, antepartum  Evaluation Date: 11/24/2021    Date of Last visit: 3/25/2022  Total Visits Received: 18    ASSESSMENT       Pt presents to PT with glute and lumbar pain. Pt is scheduled to be induced on 4/1/2022 so she will be d/c from PT at this time. Pt did not meet all goals set at evaluation secondary to increasing mass from pregnancy. Pt has decreased pain since baby dropped lower in pelvis, however, she continues to have intermittent pain. Pt was educated on importance of starting stool softeners and using proper defecation posture to decrease pressure on PF post-partum. Pt will be d/c from PT with HEP at this time.    Discharge reason: Patient requested discharge    Discharge FOTO Score: n/a    Goals:   Short Term Goals (6 Weeks):  1. Pt able to stand >=30 minutes with ADLs with <4/10 pain. (not met)  2. Pt able to walk >=30 minutes with household chores with <4/10 pain. (not met)  3.  Pt able to transfer in/out of chairs of various heights with <4/10 pain. (goal met 12/30/2021)  4. Pt to demonstrate improved functional ability with FOTO limitation <=68% disability. (goal met 1/19/2022)     Long Term Goals (12 Weeks):  1. Pt able to stand >=1 hour with with ADLs with <3/10 pain. (not met)  2. Pt able to walk >=1 hour with household chores with <3/10 pain. (not met)  3.  Pt able to squat/lift 5# from floor to waist with <3/10 pain. (not met)  4. Pt able to ascend/descend 1 flight of stairs, independently, 1 handrail, with <3/10 pain. (goal met  3/25/2022)  5. Pt able to sit >= 1 hour with CPU work with <3/10 pain. (not met)  6. Pt is independent with all bed mobility. (goal met 3/25/2022)  7. Pt will be independent with HEP and self management of symptoms. (progressing, not met)  8. Pt to demonstrate improved functional ability with FOTO limitation <=58% disability. (goal met 1/19/2022)    PLAN   This patient is discharged from Physical Therapy      Kristin Vidales, PT

## 2022-03-28 NOTE — PATIENT INSTRUCTIONS
Home Exercise Program: 03/28/2022    Bowel Movement Body Mechanics  1. Sit on the toilet comfortably with legs and buttocks relaxed.  2. Put your feet on a step stool or squatty potty (8 inches tall).  3. Lean forward while keeping your back straight and rest your elbows on your knees.  4. Keep your knees apart.  5. Exhale like you are blowing out birthday candles while you gently bear down.   6. After 4-5 pushes perform 2-3 quick kegels and then resume pushing with exhalation.     Do not strain and Do not hold your breath.

## 2022-03-29 ENCOUNTER — PATIENT MESSAGE (OUTPATIENT)
Dept: OBSTETRICS AND GYNECOLOGY | Facility: CLINIC | Age: 32
End: 2022-03-29
Payer: OTHER GOVERNMENT

## 2022-03-31 ENCOUNTER — DOCUMENTATION ONLY (OUTPATIENT)
Dept: OBSTETRICS AND GYNECOLOGY | Facility: CLINIC | Age: 32
End: 2022-03-31
Payer: OTHER GOVERNMENT

## 2022-03-31 ENCOUNTER — PATIENT MESSAGE (OUTPATIENT)
Dept: OBSTETRICS AND GYNECOLOGY | Facility: OTHER | Age: 32
End: 2022-03-31
Payer: OTHER GOVERNMENT

## 2022-04-01 ENCOUNTER — HOSPITAL ENCOUNTER (INPATIENT)
Facility: OTHER | Age: 32
LOS: 2 days | Discharge: HOME | End: 2022-04-03
Attending: OBSTETRICS & GYNECOLOGY | Admitting: OBSTETRICS & GYNECOLOGY
Payer: OTHER GOVERNMENT

## 2022-04-01 ENCOUNTER — ANESTHESIA EVENT (OUTPATIENT)
Dept: OBSTETRICS AND GYNECOLOGY | Facility: OTHER | Age: 32
End: 2022-04-01
Payer: OTHER GOVERNMENT

## 2022-04-01 DIAGNOSIS — Z34.80 SUPERVISION OF OTHER NORMAL PREGNANCY, ANTEPARTUM: ICD-10-CM

## 2022-04-01 PROBLEM — Z34.90 ENCOUNTER FOR INDUCTION OF LABOR: Status: ACTIVE | Noted: 2022-04-01

## 2022-04-01 PROBLEM — Z34.90 ENCOUNTER FOR INDUCTION OF LABOR: Status: RESOLVED | Noted: 2022-04-01 | Resolved: 2022-04-01

## 2022-04-01 LAB
ABO + RH BLD: NORMAL
BASOPHILS # BLD AUTO: 0.02 K/UL (ref 0–0.2)
BASOPHILS NFR BLD: 0.2 % (ref 0–1.9)
BLD GP AB SCN CELLS X3 SERPL QL: NORMAL
DIFFERENTIAL METHOD: ABNORMAL
EOSINOPHIL # BLD AUTO: 0.1 K/UL (ref 0–0.5)
EOSINOPHIL NFR BLD: 0.8 % (ref 0–8)
ERYTHROCYTE [DISTWIDTH] IN BLOOD BY AUTOMATED COUNT: 15.9 % (ref 11.5–14.5)
HCT VFR BLD AUTO: 31.5 % (ref 37–48.5)
HGB BLD-MCNC: 10.1 G/DL (ref 12–16)
IMM GRANULOCYTES # BLD AUTO: 0.08 K/UL (ref 0–0.04)
IMM GRANULOCYTES NFR BLD AUTO: 0.9 % (ref 0–0.5)
LYMPHOCYTES # BLD AUTO: 1.7 K/UL (ref 1–4.8)
LYMPHOCYTES NFR BLD: 19.4 % (ref 18–48)
MCH RBC QN AUTO: 26.5 PG (ref 27–31)
MCHC RBC AUTO-ENTMCNC: 32.1 G/DL (ref 32–36)
MCV RBC AUTO: 83 FL (ref 82–98)
MONOCYTES # BLD AUTO: 0.6 K/UL (ref 0.3–1)
MONOCYTES NFR BLD: 6.9 % (ref 4–15)
NEUTROPHILS # BLD AUTO: 6.3 K/UL (ref 1.8–7.7)
NEUTROPHILS NFR BLD: 71.8 % (ref 38–73)
NRBC BLD-RTO: 0 /100 WBC
PLATELET # BLD AUTO: 238 K/UL (ref 150–450)
PMV BLD AUTO: 10.3 FL (ref 9.2–12.9)
RBC # BLD AUTO: 3.81 M/UL (ref 4–5.4)
SARS-COV-2 RDRP RESP QL NAA+PROBE: NEGATIVE
WBC # BLD AUTO: 8.71 K/UL (ref 3.9–12.7)

## 2022-04-01 PROCEDURE — 51702 INSERT TEMP BLADDER CATH: CPT

## 2022-04-01 PROCEDURE — 86850 RBC ANTIBODY SCREEN: CPT | Performed by: OBSTETRICS & GYNECOLOGY

## 2022-04-01 PROCEDURE — 63600175 PHARM REV CODE 636 W HCPCS: Performed by: ANESTHESIOLOGY

## 2022-04-01 PROCEDURE — 63600175 PHARM REV CODE 636 W HCPCS: Performed by: STUDENT IN AN ORGANIZED HEALTH CARE EDUCATION/TRAINING PROGRAM

## 2022-04-01 PROCEDURE — 85025 COMPLETE CBC W/AUTO DIFF WBC: CPT | Performed by: OBSTETRICS & GYNECOLOGY

## 2022-04-01 PROCEDURE — 72100002 HC LABOR CARE, 1ST 8 HOURS

## 2022-04-01 PROCEDURE — 59400 OBSTETRICAL CARE: CPT | Mod: QY,,, | Performed by: ANESTHESIOLOGY

## 2022-04-01 PROCEDURE — 25000003 PHARM REV CODE 250: Performed by: STUDENT IN AN ORGANIZED HEALTH CARE EDUCATION/TRAINING PROGRAM

## 2022-04-01 PROCEDURE — 72100003 HC LABOR CARE, EA. ADDL. 8 HRS

## 2022-04-01 PROCEDURE — 59400 PR FULL ROUT OBSTE CARE,VAGINAL DELIV: ICD-10-PCS | Mod: ,,, | Performed by: OBSTETRICS & GYNECOLOGY

## 2022-04-01 PROCEDURE — 62326 NJX INTERLAMINAR LMBR/SAC: CPT | Performed by: ANESTHESIOLOGY

## 2022-04-01 PROCEDURE — 59400 PRA FULL ROUT OBSTE CARE,VAGINAL DELIV: ICD-10-PCS | Mod: QY,,, | Performed by: ANESTHESIOLOGY

## 2022-04-01 PROCEDURE — 25000003 PHARM REV CODE 250: Performed by: ANESTHESIOLOGY

## 2022-04-01 PROCEDURE — 59400 OBSTETRICAL CARE: CPT | Mod: ,,, | Performed by: OBSTETRICS & GYNECOLOGY

## 2022-04-01 PROCEDURE — C1751 CATH, INF, PER/CENT/MIDLINE: HCPCS | Performed by: ANESTHESIOLOGY

## 2022-04-01 PROCEDURE — 72200005 HC VAGINAL DELIVERY LEVEL II

## 2022-04-01 PROCEDURE — U0002 COVID-19 LAB TEST NON-CDC: HCPCS | Performed by: OBSTETRICS & GYNECOLOGY

## 2022-04-01 PROCEDURE — 11000001 HC ACUTE MED/SURG PRIVATE ROOM

## 2022-04-01 PROCEDURE — 27200710 HC EPIDURAL INFUSION PUMP SET: Performed by: ANESTHESIOLOGY

## 2022-04-01 RX ORDER — FENTANYL CITRATE 50 UG/ML
INJECTION, SOLUTION INTRAMUSCULAR; INTRAVENOUS
Status: COMPLETED
Start: 2022-04-01 | End: 2022-04-01

## 2022-04-01 RX ORDER — OXYTOCIN/RINGER'S LACTATE 30/500 ML
95 PLASTIC BAG, INJECTION (ML) INTRAVENOUS CONTINUOUS
Status: ACTIVE | OUTPATIENT
Start: 2022-04-01 | End: 2022-04-01

## 2022-04-01 RX ORDER — PHENYLEPHRINE HYDROCHLORIDE 10 MG/ML
INJECTION INTRAVENOUS
Status: DISCONTINUED | OUTPATIENT
Start: 2022-04-01 | End: 2022-04-01

## 2022-04-01 RX ORDER — SIMETHICONE 80 MG
1 TABLET,CHEWABLE ORAL 4 TIMES DAILY PRN
Status: DISCONTINUED | OUTPATIENT
Start: 2022-04-01 | End: 2022-04-03 | Stop reason: HOSPADM

## 2022-04-01 RX ORDER — SODIUM CHLORIDE, SODIUM LACTATE, POTASSIUM CHLORIDE, CALCIUM CHLORIDE 600; 310; 30; 20 MG/100ML; MG/100ML; MG/100ML; MG/100ML
INJECTION, SOLUTION INTRAVENOUS CONTINUOUS
Status: DISCONTINUED | OUTPATIENT
Start: 2022-04-01 | End: 2022-04-03 | Stop reason: HOSPADM

## 2022-04-01 RX ORDER — PROCHLORPERAZINE EDISYLATE 5 MG/ML
5 INJECTION INTRAMUSCULAR; INTRAVENOUS EVERY 6 HOURS PRN
Status: DISCONTINUED | OUTPATIENT
Start: 2022-04-01 | End: 2022-04-03 | Stop reason: HOSPADM

## 2022-04-01 RX ORDER — ONDANSETRON 8 MG/1
8 TABLET, ORALLY DISINTEGRATING ORAL EVERY 8 HOURS PRN
Status: DISCONTINUED | OUTPATIENT
Start: 2022-04-01 | End: 2022-04-03 | Stop reason: HOSPADM

## 2022-04-01 RX ORDER — HYDROCORTISONE 25 MG/G
CREAM TOPICAL 3 TIMES DAILY PRN
Status: DISCONTINUED | OUTPATIENT
Start: 2022-04-01 | End: 2022-04-03 | Stop reason: HOSPADM

## 2022-04-01 RX ORDER — FENTANYL/BUPIVACAINE/NS/PF 2MCG/ML-.1
PLASTIC BAG, INJECTION (ML) INJECTION CONTINUOUS
Status: DISCONTINUED | OUTPATIENT
Start: 2022-04-01 | End: 2022-04-01

## 2022-04-01 RX ORDER — ACETAMINOPHEN 325 MG/1
650 TABLET ORAL EVERY 6 HOURS PRN
Status: DISCONTINUED | OUTPATIENT
Start: 2022-04-01 | End: 2022-04-03 | Stop reason: HOSPADM

## 2022-04-01 RX ORDER — FENTANYL/BUPIVACAINE/NS/PF 2MCG/ML-.1
PLASTIC BAG, INJECTION (ML) INJECTION
Status: COMPLETED
Start: 2022-04-01 | End: 2022-04-01

## 2022-04-01 RX ORDER — DOCUSATE SODIUM 100 MG/1
200 CAPSULE, LIQUID FILLED ORAL 2 TIMES DAILY PRN
Status: DISCONTINUED | OUTPATIENT
Start: 2022-04-01 | End: 2022-04-03 | Stop reason: HOSPADM

## 2022-04-01 RX ORDER — FENTANYL CITRATE 50 UG/ML
INJECTION, SOLUTION INTRAMUSCULAR; INTRAVENOUS
Status: DISCONTINUED | OUTPATIENT
Start: 2022-04-01 | End: 2022-04-01

## 2022-04-01 RX ORDER — DIPHENHYDRAMINE HCL 25 MG
25 CAPSULE ORAL EVERY 4 HOURS PRN
Status: DISCONTINUED | OUTPATIENT
Start: 2022-04-01 | End: 2022-04-03 | Stop reason: HOSPADM

## 2022-04-01 RX ORDER — OXYTOCIN/RINGER'S LACTATE 30/500 ML
95 PLASTIC BAG, INJECTION (ML) INTRAVENOUS ONCE
Status: DISCONTINUED | OUTPATIENT
Start: 2022-04-01 | End: 2022-04-03 | Stop reason: HOSPADM

## 2022-04-01 RX ORDER — TRANEXAMIC ACID 100 MG/ML
1000 INJECTION, SOLUTION INTRAVENOUS ONCE AS NEEDED
Status: DISCONTINUED | OUTPATIENT
Start: 2022-04-01 | End: 2022-04-03 | Stop reason: HOSPADM

## 2022-04-01 RX ORDER — FAMOTIDINE 10 MG/ML
20 INJECTION INTRAVENOUS ONCE
Status: DISCONTINUED | OUTPATIENT
Start: 2022-04-01 | End: 2022-04-01

## 2022-04-01 RX ORDER — FENTANYL/BUPIVACAINE/NS/PF 2MCG/ML-.1
PLASTIC BAG, INJECTION (ML) INJECTION CONTINUOUS PRN
Status: DISCONTINUED | OUTPATIENT
Start: 2022-04-01 | End: 2022-04-01

## 2022-04-01 RX ORDER — BUPIVACAINE HYDROCHLORIDE 2.5 MG/ML
INJECTION, SOLUTION EPIDURAL; INFILTRATION; INTRACAUDAL
Status: DISPENSED
Start: 2022-04-01 | End: 2022-04-01

## 2022-04-01 RX ORDER — DIPHENOXYLATE HYDROCHLORIDE AND ATROPINE SULFATE 2.5; .025 MG/1; MG/1
1 TABLET ORAL 4 TIMES DAILY PRN
Status: DISCONTINUED | OUTPATIENT
Start: 2022-04-01 | End: 2022-04-03 | Stop reason: HOSPADM

## 2022-04-01 RX ORDER — IBUPROFEN 600 MG/1
600 TABLET ORAL EVERY 6 HOURS
Status: DISCONTINUED | OUTPATIENT
Start: 2022-04-01 | End: 2022-04-03 | Stop reason: HOSPADM

## 2022-04-01 RX ORDER — CALCIUM CARBONATE 200(500)MG
500 TABLET,CHEWABLE ORAL 3 TIMES DAILY PRN
Status: DISCONTINUED | OUTPATIENT
Start: 2022-04-01 | End: 2022-04-03 | Stop reason: HOSPADM

## 2022-04-01 RX ORDER — CEFAZOLIN SODIUM 2 G/50ML
2 SOLUTION INTRAVENOUS ONCE AS NEEDED
Status: DISCONTINUED | OUTPATIENT
Start: 2022-04-01 | End: 2022-04-03 | Stop reason: HOSPADM

## 2022-04-01 RX ORDER — SENNOSIDES 8.6 MG/1
8.6 TABLET ORAL DAILY PRN
Status: DISCONTINUED | OUTPATIENT
Start: 2022-04-01 | End: 2022-04-03 | Stop reason: HOSPADM

## 2022-04-01 RX ORDER — SODIUM CHLORIDE 9 MG/ML
INJECTION, SOLUTION INTRAVENOUS
Status: DISCONTINUED | OUTPATIENT
Start: 2022-04-01 | End: 2022-04-03 | Stop reason: HOSPADM

## 2022-04-01 RX ORDER — SODIUM CITRATE AND CITRIC ACID MONOHYDRATE 334; 500 MG/5ML; MG/5ML
30 SOLUTION ORAL ONCE
Status: DISCONTINUED | OUTPATIENT
Start: 2022-04-01 | End: 2022-04-01

## 2022-04-01 RX ORDER — HYDROCODONE BITARTRATE AND ACETAMINOPHEN 10; 325 MG/1; MG/1
1 TABLET ORAL EVERY 4 HOURS PRN
Status: DISCONTINUED | OUTPATIENT
Start: 2022-04-01 | End: 2022-04-03 | Stop reason: HOSPADM

## 2022-04-01 RX ORDER — SODIUM CHLORIDE 0.9 % (FLUSH) 0.9 %
10 SYRINGE (ML) INJECTION
Status: DISCONTINUED | OUTPATIENT
Start: 2022-04-01 | End: 2022-04-03 | Stop reason: HOSPADM

## 2022-04-01 RX ORDER — LANOLIN ALCOHOL/MO/W.PET/CERES
1 CREAM (GRAM) TOPICAL DAILY
Status: DISCONTINUED | OUTPATIENT
Start: 2022-04-02 | End: 2022-04-03 | Stop reason: HOSPADM

## 2022-04-01 RX ORDER — HYDROCODONE BITARTRATE AND ACETAMINOPHEN 5; 325 MG/1; MG/1
1 TABLET ORAL EVERY 4 HOURS PRN
Status: DISCONTINUED | OUTPATIENT
Start: 2022-04-01 | End: 2022-04-03 | Stop reason: HOSPADM

## 2022-04-01 RX ORDER — DIPHENHYDRAMINE HYDROCHLORIDE 50 MG/ML
25 INJECTION INTRAMUSCULAR; INTRAVENOUS EVERY 4 HOURS PRN
Status: DISCONTINUED | OUTPATIENT
Start: 2022-04-01 | End: 2022-04-03 | Stop reason: HOSPADM

## 2022-04-01 RX ORDER — OXYTOCIN/RINGER'S LACTATE 30/500 ML
0-30 PLASTIC BAG, INJECTION (ML) INTRAVENOUS CONTINUOUS
Status: DISCONTINUED | OUTPATIENT
Start: 2022-04-01 | End: 2022-04-01

## 2022-04-01 RX ORDER — OXYTOCIN/RINGER'S LACTATE 30/500 ML
334 PLASTIC BAG, INJECTION (ML) INTRAVENOUS ONCE
Status: DISCONTINUED | OUTPATIENT
Start: 2022-04-01 | End: 2022-04-03 | Stop reason: HOSPADM

## 2022-04-01 RX ADMIN — SODIUM CHLORIDE, SODIUM LACTATE, POTASSIUM CHLORIDE, AND CALCIUM CHLORIDE: .6; .31; .03; .02 INJECTION, SOLUTION INTRAVENOUS at 10:04

## 2022-04-01 RX ADMIN — SODIUM CHLORIDE, SODIUM LACTATE, POTASSIUM CHLORIDE, AND CALCIUM CHLORIDE: .6; .31; .03; .02 INJECTION, SOLUTION INTRAVENOUS at 07:04

## 2022-04-01 RX ADMIN — PHENYLEPHRINE HYDROCHLORIDE 100 MCG: 10 INJECTION INTRAVENOUS at 09:04

## 2022-04-01 RX ADMIN — SODIUM CHLORIDE, SODIUM LACTATE, POTASSIUM CHLORIDE, AND CALCIUM CHLORIDE: .6; .31; .03; .02 INJECTION, SOLUTION INTRAVENOUS at 04:04

## 2022-04-01 RX ADMIN — Medication 2 MILLI-UNITS/MIN: at 06:04

## 2022-04-01 RX ADMIN — SODIUM CHLORIDE, SODIUM LACTATE, POTASSIUM CHLORIDE, AND CALCIUM CHLORIDE: .6; .31; .03; .02 INJECTION, SOLUTION INTRAVENOUS at 05:04

## 2022-04-01 RX ADMIN — Medication 5 ML: at 07:04

## 2022-04-01 RX ADMIN — IBUPROFEN 600 MG: 600 TABLET ORAL at 06:04

## 2022-04-01 RX ADMIN — FENTANYL CITRATE 100 MCG: 50 INJECTION, SOLUTION INTRAMUSCULAR; INTRAVENOUS at 07:04

## 2022-04-01 RX ADMIN — Medication 10 ML/HR: at 07:04

## 2022-04-01 NOTE — PROGRESS NOTES
.  POSTPARTUM PROGRESS NOTE    Subjective:     PPD/POD#: 1   Procedure:    EGA: 39w1d   N/V: No   F/C: No   Abd Pain: Mild, well-controlled with oral pain medication   Lochia: Mild   Voiding: Yes   Ambulating: Yes   Bowel fnc: No   Breastfeeding: Yes   Contraception: Desires pp BTL    Circumcision: Consented.  Needs to be examined by OB attending.     Objective:      Temp:  [97.8 °F (36.6 °C)-98.1 °F (36.7 °C)] (P) 97.8 °F (36.6 °C)  Pulse:  [] 82  Resp:  [17] 17  SpO2:  [91 %-100 %] 100 %  BP: ()/(53-87) 106/57    Lung: Normal respiratory effort   Abdomen: Soft, appropriately tender   Uterus: Firm, no fundal tenderness   Incision: N/A   : Deferred   Extremities: Bilateral trace edema     Lab Review    No results for input(s): NA, K, CL, CO2, BUN, CREATININE, GLU, PROT, BILITOT, ALKPHOS, ALT, AST, MG, PHOS in the last 168 hours.    Recent Labs   Lab 22  0559   WBC 8.71   HGB 10.1*   HCT 31.5*   MCV 83            I/O    Intake/Output Summary (Last 24 hours) at 2022 1752  Last data filed at 2022 1721  Gross per 24 hour   Intake 3003.24 ml   Output 200 ml   Net 2803.24 ml        Assessment and Plan:   Postpartum care:  - Patient doing well.  - Continue routine management and advances.    Anemia   - H/H as above   - AM CBC pending   - fe/colace    Undesired fertility   - medicaid tubal consents signed 22  - will attempt to get BTL performed prior to discharge     Milli Solitario MD   PGY-2, OB-GYN          Milli Solitario

## 2022-04-01 NOTE — CARE UPDATE
Resident to bedside for cervical exam. SVE 10/100/+1.     Staff notified, will set up to push.    June Decker MD  OBGYN, PGY-1

## 2022-04-01 NOTE — PROGRESS NOTES
04/01/22 0920   TeleStork Nayeli Note - Strip   Strip Reviewed by Nayeli Nurse? Yes   TeleStork Nayeli Note - Communication   Holmes Nurse Communicated with Bedside Nurse Regarding: Fetal Status   TeleStork Nayeli Note - Notification   Nurse Notified? Yes   Name of Nurse Carrie

## 2022-04-01 NOTE — CARE UPDATE
Category 1 w/ accel x2  Moderate variability    Discussed with on call staff and MFM fellow/staff. All in agreement to continue with IOL. Continue to closely monitor.    ALDO Foley MD  OBGYN PGY-3

## 2022-04-01 NOTE — ANESTHESIA PREPROCEDURE EVALUATION
[facility]  Anesthesia Pre-Operative Evaluation         Patient Name: Barbie Kumar  YOB: 1990  MRN: [id number]    SUBJECTIVE:     Pre-operative evaluation for * No surgery found *     2022    Barbie Kumar is a 31 y.o.  female with IUP at 39w1d weeks gestation who presents for IOL.     Patient denies contractions, denies vaginal bleeding, denies LOF.   Fetal Movement: normal.     This IUP is complicated by shortened cervix (21 mm, on prometrium), fetal hypoplastic nasal bone, undesired fertility, anemia, h/o shoulder dystocia.       Patient now presents for the above procedure(s).      LDA: None documented.       Peripheral IV - Single Lumen 22 0545 18 G Anterior;Left Hand (Active)   Number of days: 0        Prev airway: None documented.    Drips: None documented.   lactated ringers 125 mL/hr at 22 0558    oxytocin in lactated ringers      oxytocin in lactated ringers          Patient Active Problem List   Diagnosis    Condyloma    Status post vaginal delivery    Nexplanon removal    History of prior pregnancy with short cervix, currently pregnant    History of cardiac arrhythmia    Pelvic pain affecting pregnancy in second trimester, antepartum    Pregnancy related hip pain, antepartum    Abnormal fetal ultrasound    Short cervix affecting pregnancy    Pregnancy related low back pain, antepartum    36 weeks gestation of pregnancy    Threatened  labor, third trimester    H/O shoulder dystocia in prior pregnancy, currently pregnant, third trimester    Anemia affecting pregnancy in third trimester    Encounter for induction of labor       Review of patient's allergies indicates:   Allergen Reactions    Phenergan vc [promethazine-phenylephrine] Anxiety    Percocet [oxycodone-acetaminophen] Anxiety       Current Outpatient Medications:    Current Facility-Administered Medications:     0.9%  NaCl infusion, , Intra-Catheter, PRN,  Jessica Moore MD    azithromycin 500 mg in dextrose 5 % 250 mL IVPB (ready to mix system), 500 mg, Intravenous, Once PRN, Jessica Moore MD    calcium carbonate 200 mg calcium (500 mg) chewable tablet 500 mg, 500 mg, Oral, TID PRN, Jessica Moore MD    cefazolin (ANCEF) 2 gram in dextrose 5% 50 mL IVPB (premix), 2 g, Intravenous, Once PRN, Jessica Moore MD    lactated ringers bolus 1,000 mL, 1,000 mL, Intravenous, PRN, Jessica Moore MD    lactated ringers bolus 500 mL, 500 mL, Intravenous, PRN, Jessica Moore MD    lactated ringers infusion, , Intravenous, Continuous, Jessica Moore MD, Last Rate: 125 mL/hr at 04/01/22 0558, New Bag at 04/01/22 0558    ondansetron disintegrating tablet 8 mg, 8 mg, Oral, Q8H PRN, Jessica Moore MD    oxytocin 30 units in 500 mL lactated ringers infusion (non-titrating), 334 dandre-units/min, Intravenous, Once, Jessica Moore MD    oxytocin 30 units in 500 mL lactated ringers infusion (non-titrating), 95 dandre-units/min, Intravenous, Continuous, Jessica Moore MD    oxytocin 30 units in 500 mL lactated ringers infusion (titrating), 0-30 dandre-units/min, Intravenous, Continuous, Nitin Foley MD    prochlorperazine injection Soln 5 mg, 5 mg, Intravenous, Q6H PRN, Jessica Moore MD    simethicone chewable tablet 80 mg, 1 tablet, Oral, QID PRN, Jessica Moore MD    tranexamic acid (CYKLOKAPRON) 1,000 mg in sodium chloride 0.9 % 100 mL (ready to mix system), 1,000 mg, Intravenous, Once PRN, Jessica Moore MD    Past Surgical History:   Procedure Laterality Date    NO PAST SURGERIES         Social History     Socioeconomic History    Marital status:    Tobacco Use    Smoking status: Never Smoker    Smokeless tobacco: Never Used   Substance and Sexual Activity    Alcohol use: No     Alcohol/week: 0.0 standard drinks    Drug use: No    Sexual activity: Yes     Partners: Male       OBJECTIVE:      Vital Signs Range (Last 24H):         Significant Labs:  Lab Results   Component Value Date    WBC 8.71 04/01/2022    HGB 10.1 (L) 04/01/2022    HCT 31.5 (L) 04/01/2022     04/01/2022    ALT 10 03/10/2022    AST 13 03/10/2022     03/10/2022    K 3.1 (L) 03/10/2022     03/10/2022    CREATININE 0.6 03/10/2022    BUN 4 (L) 03/10/2022    CO2 21 (L) 03/10/2022    INR 1.0 09/26/2017       Diagnostic Studies: No relevant studies.    EKG:   Results for orders placed or performed during the hospital encounter of 09/29/21   EKG 12-lead    Collection Time: 09/29/21 10:32 AM    Narrative    Test Reason : Z86.79,    Vent. Rate : 091 BPM     Atrial Rate : 091 BPM     P-R Int : 118 ms          QRS Dur : 070 ms      QT Int : 344 ms       P-R-T Axes : 068 080 038 degrees     QTc Int : 423 ms    Normal sinus rhythm  Normal ECG  When compared with ECG of 26-APR-2021 10:41,  No significant change was found  Confirmed by Jaison Conner MD ([id number]) on 9/29/2021 10:46:40 AM    Referred By: JOURDAN TORRES           Confirmed By:Jaison Conner MD       2D ECHO:  TTE:  No results found for this or any previous visit.    JANETH:  No results found for this or any previous visit.    ASSESSMENT/PLAN:           Pre-op Assessment    I have reviewed the Patient Summary Reports.    I have reviewed the NPO Status.   I have reviewed the Medications.     Review of Systems  Anesthesia Hx:  History of prior surgery of interest to airway management or planning:  Denies Personal Hx of Anesthesia complications.   Cardiovascular:  Cardiovascular Normal     Pulmonary:  Pulmonary Normal    Renal/:  Renal/ Normal     Neurological:  Neurology Normal        Physical Exam  General: Well nourished    Airway:  Mallampati: I       Chest/Lungs:  Clear to auscultation, Normal Respiratory Rate    Heart:  Rate: Normal  Rhythm: Regular Rhythm        Anesthesia Plan  Type of Anesthesia, risks & benefits discussed:    Anesthesia Type:  Epidural  Intra-op Monitoring Plan: Standard ASA Monitors  Post Op Pain Control Plan: multimodal analgesia  Informed Consent: Informed consent signed with the Patient and all parties understand the risks and agree with anesthesia plan.  All questions answered.   ASA Score: 2    Ready For Surgery From Anesthesia Perspective.     .

## 2022-04-01 NOTE — ANESTHESIA PROCEDURE NOTES
Epidural    Patient location during procedure: OB   Reason for block: primary anesthetic   Reason for block: labor analgesia requested by patient and obstetrician  Diagnosis: Active Labor   Start time: 4/1/2022 7:32 AM  Timeout: 4/1/2022 7:30 AM  End time: 4/1/2022 7:38 AM  Surgery related to: Vaginal Delivery    Staffing  Performing Provider: Hemanth Lloyd MD  Authorizing Provider: Hemanth Lloyd MD    Staffing  Other anesthesia staff: Jaycee Jarrell MD      Preanesthetic Checklist  Completed: patient identified, IV checked, site marked, risks and benefits discussed, surgical consent, monitors and equipment checked, pre-op evaluation, timeout performed, anesthesia consent given, hand hygiene performed and patient being monitored  Preparation  Patient position: sitting  Prep: ChloraPrep  Patient monitoring: Pulse Ox and Blood Pressure  Reason for block: primary anesthetic   Epidural  Skin Anesthetic: lidocaine 1%  Skin Wheal: 3 mL  Administration type: continuous  Approach: midline  Interspace: L3-4    Injection technique: RACHEL saline  Needle and Epidural Catheter  Needle type: Tuohy   Needle gauge: 17  Needle length: 3.5 inches  Needle insertion depth: 4.5 cm  Catheter type: springwound and multi-orifice  Catheter size: 19 G  Catheter at skin depth: 9 cm  Insertion Attempts: 1  Test dose: 3 mL of lidocaine 1.5% with Epi 1-to-200,000  Additional Documentation: incremental injection, negative aspiration for heme and CSF, no paresthesia on injection, no signs/symptoms of IV or SA injection, no significant pain on injection and no significant complaints from patient  Needle localization: anatomical landmarks  Medications:  Volume per aspiration: 5 mL  Time between aspirations: 5 minutes   Assessment  Ease of block: easy  Patient's tolerance of the procedure: comfortable throughout block and no complaints No inadvertent dural puncture with Tuohy.  Dural puncture performed with spinal needle.

## 2022-04-01 NOTE — L&D DELIVERY NOTE
[facility] - Labor & Delivery  Vaginal Delivery   Operative Note    SUMMARY     Normal spontaneous vaginal delivery of live infant, was placed on mothers abdomen for skin to skin and bulb suctioning performed.  Infant delivered position OA over intact perineum.  Nuchal cord: No.    Spontaneous delivery of placenta and IV pitocin given noting good uterine tone.  2nd degree laceration noted and repaired in normal fashion with 2-0 vicryl on CT and 2-0 vicryl on SH..  Patient tolerated delivery well. Sponge needle and lap counted correctly x2.    Indications:  (spontaneous vaginal delivery)  Pregnancy complicated by:   Patient Active Problem List   Diagnosis    Condyloma    Status post vaginal delivery    Nexplanon removal    History of prior pregnancy with short cervix, currently pregnant    History of cardiac arrhythmia    Pelvic pain affecting pregnancy in second trimester, antepartum    Pregnancy related hip pain, antepartum    Abnormal fetal ultrasound    Short cervix affecting pregnancy    Pregnancy related low back pain, antepartum    36 weeks gestation of pregnancy    Threatened  labor, third trimester    H/O shoulder dystocia in prior pregnancy, currently pregnant, third trimester    Anemia affecting pregnancy in third trimester     (spontaneous vaginal delivery)     Admitting GA: 39w1d    Delivery Information for Jaquan Kumar    Birth information:  YOB: 2022   Time of birth: 5:04 PM   Sex: male   Head Delivery Date/Time: 2022  5:04 PM   Delivery type: Vaginal, Spontaneous   Gestational Age: 39w1d    Delivery Providers    Delivering clinician: Milton Sanchez MD   Provider Role    MD Di Smith, EUSEBIO Edmonds, RN             Measurements    Weight:   Length:          Apgars    Living status: Living  Apgars:  1 min.:  5 min.:  10 min.:  15 min.:  20 min.:    Skin color:  0  1       Heart rate:  2  2       Reflex irritability:  2   2       Muscle tone:  2  2       Respiratory effort:  2  2       Total:  8  9       Apgars assigned by: MORENA JENKINS         Operative Delivery    Forceps attempted?: No         Shoulder Dystocia    Shoulder dystocia present?: No           Presentation    Presentation: Vertex  Position: Right Occiput Anterior           Interventions/Resuscitation    Method: Bulb Suctioning, Tactile Stimulation       Cord    Vessels: 3 vessels  Complications: None  Delayed Cord Clamping?: Yes  Cord Clamped Date/Time: 2022  5:06 PM  Cord Blood Disposition: Sent with Baby  Gases Sent?: No       Placenta    Placenta delivery date/time:   Placenta removal:            Labor Events:       labor: No     Labor Onset Date/Time:         Dilation Complete Date/Time:         Start Pushing Date/Time:         Start Pushing Date/Time:       Rupture Date/Time: 22  112         Rupture type:          Fluid Amount:       Fluid Color: Clear      Fluid Odor:       Membrane Status: ARM (Artificial Rupture)               steroids: None     Antibiotics given for GBS:       Induction:       Indications for induction:        Augmentation:       Indications for augmentation:       Labor complications: Fetal Intolerance     Additional complications:          Cervical ripening:                     Delivery:      Episiotomy:       Indication for Episiotomy:       Perineal Lacerations: 2nd Repaired:  Yes   Periurethral Laceration:   Repaired:     Labial Laceration:   Repaired:     Sulcus Laceration:   Repaired:     Vaginal Laceration:   Repaired:     Cervical Laceration:   Repaired:     Repair suture:       Repair # of packets: 2     Last Value - EBL - Nursing (mL):       Sum - EBL - Nursing (mL): 0     Last Value - EBL - Anesthesia (mL):      Calculated QBL (mL): 200      Vaginal Sweep Performed: Yes     Surgicount Correct:         Other providers:       Anesthesia    Method: Epidural          Details (if  applicable):  Trial of Labor      Categorization:      Priority:     Indications for :     Incision Type:       Additional  information:  Forceps:    Vacuum:    Breech:    Observed anomalies    Other (Comments):         Ashley Martinez MD  OBGYN PGY-1

## 2022-04-01 NOTE — CARE UPDATE
Late entry from 0945    Resident to bedside due to minimal variability on NST.     Anesthesia in room giving bolus due to borderline low blood pressure. Acceleration noted after bolus and improvement in BP, however, minimal variability still noted. Scalp stimulation perform for 20 seconds without improvement in variability or accelerations.     Discussed with patient we will continue to watch the fetal heart tones closely. Informed patient if there is no improvement in variability in the next 20-30 min then we would recommend proceeding with a  section. Patient voiced understanding. Will reassess at 1015.    June Decker MD  OBGYN, PGY-1

## 2022-04-01 NOTE — PROGRESS NOTES
LABOR NOTE    S:  Complaints: No.  Epidural working:  yes  MD to bedside for ROM    O: /80   Pulse 84   Temp (P) 97.8 °F (36.6 °C)   Resp 17   Ht 5' (1.524 m)   Wt 64 kg (141 lb)   LMP 06/30/2021   SpO2 100%   Breastfeeding No   BMI 27.54 kg/m²     FHT: 120, minimal variability, - accels, -decels Cat 2 (reassuring)  CTX: q 4 minutes  SVE: 5/80/-2, AROM, clear    PLAN:    Continue Close Maternal/Fetal Monitoring-will continue to monitor  Pt previously counseled on possible need for CSD  Recheck 2 hours or PRN    Ashley Martinez MD  OBGYN PGY-1

## 2022-04-01 NOTE — PROGRESS NOTES
Pitocin turned off for prolonged deceleration after 3 minute contraception  SVE 8cm  Moderate variability, FHT recovered to baseline after deceleration

## 2022-04-01 NOTE — PROGRESS NOTES
SVE 7/80/-1  Cat II due to periods of minimal variability  +scalp stim response  Given that patient is making change, is a multip and +scalp stim > will continue with first stage of labor

## 2022-04-01 NOTE — PROGRESS NOTES
LABOR PROGRESS NOTE    MD to bedside for cervical check. Complaints: None.  Temp:  [98.1 °F (36.7 °C)] 98.1 °F (36.7 °C)  Pulse:  [] 97  Resp:  [17] 17  SpO2:  [98 %-100 %] 100 %  BP: (129-143)/(72-87) 143/87    FHT: Category 2; 155 BPM/ minimal beat to beat variability/ -accels/ + late decels improved with repositioning   CTX: q  5 minutes     CVX at 9:06 AM   Dilation (cm): 3  Effacement (%): 70  Station: -3  Dose(units) Oxytocin: *4 dandre-units/min     ASSESSMENT   31 y.o.  at 39w1d, cat 2. Pitocin paused    TIMELINE  0630: 3/70/-3 pit started  0900: /-2    PLAN  1. Labor management  -Continue continuous maternal/fetal monitoring.   -Recheck 2-4 hours or PRN  -Pitocin per orders    ALDO Foley MD  OBGYN PGY-3

## 2022-04-01 NOTE — H&P
HISTORY AND PHYSICAL                                                OBSTETRICS          Subjective:       Barbie Kumar is a 31 y.o.  female with IUP at 39w1d weeks gestation who presents for IOL.    Patient denies contractions, denies vaginal bleeding, denies LOF.   Fetal Movement: normal.    This IUP is complicated by shortened cervix (21 mm, on prometrium), fetal hypoplastic nasal bone, undesired fertility, anemia, h/o shoulder dystocia.    Review of Systems   Constitutional: Negative for chills and fever.   HENT: Negative.    Eyes: Negative for visual disturbance.   Respiratory: Negative for shortness of breath.    Cardiovascular: Negative for chest pain and palpitations.   Gastrointestinal: Negative for bloating, constipation, diarrhea, nausea and vomiting.   Genitourinary: Negative for vaginal bleeding, vaginal discharge and vaginal pain.   Musculoskeletal: Negative for back pain and myalgias.   Neurological: Negative for headaches.   Hematological: Negative.    Psychiatric/Behavioral: The patient is not nervous/anxious.        PMHx:   Past Medical History:   Diagnosis Date    Abnormal Pap smear     Arrhythmia     Condyloma     Tuberculosis        PSHx:   Past Surgical History:   Procedure Laterality Date    NO PAST SURGERIES         All:   Review of patient's allergies indicates:   Allergen Reactions    Phenergan vc [promethazine-phenylephrine] Anxiety    Percocet [oxycodone-acetaminophen] Anxiety       Meds:   Medications Prior to Admission   Medication Sig Dispense Refill Last Dose    ferrous gluconate (FERGON) 324 MG tablet Take 1 tablet (324 mg total) by mouth 2 (two) times a day. (Patient not taking: No sig reported) 90 tablet 1     hydrocortisone 2.5 % cream Apply topically 2 (two) times daily. (Patient not taking: Reported on 3/25/2022) 20 g 1     prenatal vit/iron fum/folic ac (PRENATAL 1+1 ORAL) Take by mouth.          SH:   Social History     Socioeconomic History    Marital  status:    Tobacco Use    Smoking status: Never Smoker    Smokeless tobacco: Never Used   Substance and Sexual Activity    Alcohol use: No     Alcohol/week: 0.0 standard drinks    Drug use: No    Sexual activity: Yes     Partners: Male       FH:   Family History   Problem Relation Age of Onset    Hypertension Paternal Grandmother     Diabetes Paternal Grandmother     Hypertension Father     Diabetes Mother     Hypertension Maternal Grandmother     Diabetes Maternal Grandmother     Hypertension Maternal Grandfather     Diabetes Maternal Grandfather     Hypertension Paternal Grandfather     Diabetes Paternal Grandfather     Ovarian cancer Neg Hx     Colon cancer Neg Hx     Breast cancer Neg Hx     Stroke Neg Hx        OBHx:   OB History    Para Term  AB Living   2 1 1 0 0 1   SAB IAB Ectopic Multiple Live Births   0 0 0 0 1      # Outcome Date GA Lbr Morgan/2nd Weight Sex Delivery Anes PTL Lv   2 Current            1 Term 10/18/15 38w3d / 00:56 3.118 kg (6 lb 14 oz) F Vag-Spont EPI N BIRGIT      Birth Comments: The pregnancy was complicated by condyloma, shortened cervix, and prolonged rupture of membranes. Delivery was complicated by PROM and decreased fetal heart rate necessitating vacuum assist. R Shoulder dystocia. Also with terminal meconium.      Apgar1: 9  Apgar5: 9       Objective:       LMP 2021     There were no vitals filed for this visit.    General: NAD, alert, oriented, cooperative  HEENT: NCAT, EOM grossly intact  Lungs: Normal WOB  Heart: regular rate  Abdomen: gravid, soft, nondistended, nontender, no rebound or guarding  Extremities: no edema    FHT: 140 bpm, moderate BTBV, +accels, -decels; Cat 1 (reassuring)  [place]: q 5mins  Presentation: cephalic by ultrasound    Cervix: 3/70/-3  SSE: n/a    EFW by Leopold's: 8    Maternal pelvis proven to 7    Recent Growth Scan: 2730g at 36w2d    Lab Review  Lab Results   Component Value Date    GROUPTRH O POS 03/10/2022     INDIRECTCOOM NEG 03/10/2022    HGB 10.1 (L) 04/01/2022    HCT 31.5 (L) 04/01/2022     04/01/2022    RPR Non-reactive 03/10/2022    [id number] Negative 08/24/2021    HEPBSAG Negative 08/24/2021    RUBELLAIMMUN Reactive 08/24/2021    STREPBCULT No Group B Streptococcus isolated 03/11/2022            Assessment:       39w1d weeks gestation for IOL    Active Hospital Problems    Diagnosis  POA    *Encounter for induction of labor [Z34.90]  Not Applicable      Resolved Hospital Problems   No resolved problems to display.          Plan:     1. IOL   Risks, benefits, alternatives and possible complications have been discussed in detail with the patient.   - Consents signed and to chart  - Admit to Labor and Delivery unit  - Epidural per Anesthesia  - Draw CBC, T&S  - Notify Staff  - Recheck in 4 hrs or PRN  - COVID neg  - Post-Partum Hemorrhage risk - low  - Postpartum lovenox: no    2. H/o shoulder dystocia  - 30 second, Yaya  - Baby weighed 3118g  - desires vaginal delivery    3. Anemia  - Admit CBC 10/31  - asymptomatic    RUTHANN. Ramin Foley MD  OBGYN PGY-3

## 2022-04-02 LAB
BASOPHILS # BLD AUTO: 0.02 K/UL (ref 0–0.2)
BASOPHILS NFR BLD: 0.2 % (ref 0–1.9)
DIFFERENTIAL METHOD: ABNORMAL
EOSINOPHIL # BLD AUTO: 0.1 K/UL (ref 0–0.5)
EOSINOPHIL NFR BLD: 0.8 % (ref 0–8)
ERYTHROCYTE [DISTWIDTH] IN BLOOD BY AUTOMATED COUNT: 16 % (ref 11.5–14.5)
HCT VFR BLD AUTO: 22.9 % (ref 37–48.5)
HGB BLD-MCNC: 7.3 G/DL (ref 12–16)
IMM GRANULOCYTES # BLD AUTO: 0.07 K/UL (ref 0–0.04)
IMM GRANULOCYTES NFR BLD AUTO: 0.5 % (ref 0–0.5)
LYMPHOCYTES # BLD AUTO: 1.6 K/UL (ref 1–4.8)
LYMPHOCYTES NFR BLD: 12.7 % (ref 18–48)
MCH RBC QN AUTO: 26.4 PG (ref 27–31)
MCHC RBC AUTO-ENTMCNC: 31.9 G/DL (ref 32–36)
MCV RBC AUTO: 83 FL (ref 82–98)
MONOCYTES # BLD AUTO: 1.1 K/UL (ref 0.3–1)
MONOCYTES NFR BLD: 8.4 % (ref 4–15)
NEUTROPHILS # BLD AUTO: 10 K/UL (ref 1.8–7.7)
NEUTROPHILS NFR BLD: 77.4 % (ref 38–73)
NRBC BLD-RTO: 0 /100 WBC
PLATELET # BLD AUTO: 186 K/UL (ref 150–450)
PMV BLD AUTO: 10.2 FL (ref 9.2–12.9)
RBC # BLD AUTO: 2.76 M/UL (ref 4–5.4)
WBC # BLD AUTO: 12.88 K/UL (ref 3.9–12.7)

## 2022-04-02 PROCEDURE — 11000001 HC ACUTE MED/SURG PRIVATE ROOM

## 2022-04-02 PROCEDURE — 36415 COLL VENOUS BLD VENIPUNCTURE: CPT | Performed by: OBSTETRICS & GYNECOLOGY

## 2022-04-02 PROCEDURE — 25000003 PHARM REV CODE 250

## 2022-04-02 PROCEDURE — 85025 COMPLETE CBC W/AUTO DIFF WBC: CPT | Performed by: OBSTETRICS & GYNECOLOGY

## 2022-04-02 PROCEDURE — 25000003 PHARM REV CODE 250: Performed by: STUDENT IN AN ORGANIZED HEALTH CARE EDUCATION/TRAINING PROGRAM

## 2022-04-02 RX ORDER — IBUPROFEN 600 MG/1
600 TABLET ORAL EVERY 6 HOURS PRN
Qty: 30 TABLET | Refills: 2 | Status: SHIPPED | OUTPATIENT
Start: 2022-04-02

## 2022-04-02 RX ORDER — PRENATAL WITH FERROUS FUM AND FOLIC ACID 3080; 920; 120; 400; 22; 1.84; 3; 20; 10; 1; 12; 200; 27; 25; 2 [IU]/1; [IU]/1; MG/1; [IU]/1; MG/1; MG/1; MG/1; MG/1; MG/1; MG/1; UG/1; MG/1; MG/1; MG/1; MG/1
1 TABLET ORAL DAILY
Status: DISCONTINUED | OUTPATIENT
Start: 2022-04-02 | End: 2022-04-03 | Stop reason: HOSPADM

## 2022-04-02 RX ORDER — SENNOSIDES 8.6 MG/1
1 TABLET ORAL DAILY PRN
Qty: 30 TABLET | Refills: 2 | Status: SHIPPED | OUTPATIENT
Start: 2022-04-02 | End: 2022-07-29 | Stop reason: CLARIF

## 2022-04-02 RX ADMIN — IBUPROFEN 600 MG: 600 TABLET ORAL at 03:04

## 2022-04-02 RX ADMIN — Medication 1 EACH: at 09:04

## 2022-04-02 RX ADMIN — PRENATAL VIT W/ FE FUMARATE-FA TAB 27-0.8 MG 1 TABLET: 27-0.8 TAB at 10:04

## 2022-04-02 RX ADMIN — IBUPROFEN 600 MG: 600 TABLET ORAL at 09:04

## 2022-04-02 RX ADMIN — DOCUSATE SODIUM 200 MG: 100 CAPSULE, LIQUID FILLED ORAL at 09:04

## 2022-04-02 NOTE — NURSING
Patient transferred to room 608 on mother/baby unit via wheelchair while she held baby. All belongings packed by friend and carried over to new room. Patient ambulating with minimal assistance. Unable to void before transferring; in and out cath. Used to drain bladder before moving. Pain controlled with po meds given by day nurse. Report given to EUSEBIO Angulo.

## 2022-04-02 NOTE — PLAN OF CARE
Tachycardia ( @ 1657), VS stable otherwise. MD notified and ordered to recheck VS in 4 hours. Pain controlled with scheduled oral pain medication. Breastfeeding. Fundus firm and midline with moderate lochia rubra. Perineum swelling noted, educated pt on the use of tucks/dermoplast. Voiding spontaneously with adequate output. Passing gas. No concerns at this time.

## 2022-04-02 NOTE — PROGRESS NOTES
.  POSTPARTUM PROGRESS NOTE    Subjective:     PPD/POD#: 1   Procedure:    EGA: 39w1d   N/V: No   F/C: No   Abd Pain: Mild, well-controlled with oral pain medication   Lochia: Mild   Voiding: Yes   Ambulating: Yes   Bowel fnc: No   Breastfeeding: Yes   Contraception: Desires interval BTL    Circumcision: Consented.  Needs to be examined by OB attending.     Objective:      Temp:  [97.7 °F (36.5 °C)-98.1 °F (36.7 °C)] 97.8 °F (36.6 °C)  Pulse:  [] 93  Resp:  [16-18] 16  SpO2:  [91 %-100 %] 98 %  BP: ()/(53-91) 114/58    Lung: Normal respiratory effort   Abdomen: Soft, appropriately tender   Uterus: Firm, no fundal tenderness   Incision: N/A   : Deferred   Extremities: Bilateral trace edema     Lab Review    No results for input(s): NA, K, CL, CO2, BUN, CREATININE, GLU, PROT, BILITOT, ALKPHOS, ALT, AST, MG, PHOS in the last 168 hours.    Recent Labs   Lab 22  0559   WBC 8.71   HGB 10.1*   HCT 31.5*   MCV 83            I/O    Intake/Output Summary (Last 24 hours) at 2022 0438  Last data filed at 2022 0245  Gross per 24 hour   Intake 3441.25 ml   Output 1575 ml   Net 1866.25 ml        Assessment and Plan:   Postpartum care:  - Patient doing well.  - Continue routine management and advances.    Anemia   - H/H as above   - AM CBC pending   - fe/colace    Undesired fertility   - medicaid tubal consents signed 22  - patient desires interval tubal      Lidia Capellan MD  OBGYN PGY-4

## 2022-04-02 NOTE — ANESTHESIA POSTPROCEDURE EVALUATION
Anesthesia Post Evaluation    Patient: Barbie Kumar    Procedure(s) Performed: * No procedures listed *    Final Anesthesia Type: epidural      Patient location during evaluation: med/surg floor  Patient participation: Yes- Able to Participate  Level of consciousness: awake and alert and oriented  Post-procedure vital signs: reviewed and stable  Pain management: adequate  Airway patency: patent  JOAO mitigation strategies: Multimodal analgesia and Use of major conduction anesthesia (spinal/epidural) or peripheral nerve block  PONV status at discharge: No PONV  Anesthetic complications: no      Cardiovascular status: blood pressure returned to baseline and hemodynamically stable  Respiratory status: unassisted, spontaneous ventilation and room air  Hydration status: euvolemic  Follow-up not needed.          Vitals Value Taken Time   /69 04/02/22 0745   Temp 36.8 °C (98.2 °F) 04/02/22 0745   Pulse 98 04/02/22 0745   Resp 16 04/02/22 0745   SpO2 98 % 04/02/22 0745         No case tracking events are documented in the log.      Pain/James Score: Pain Rating Prior to Med Admin: 4 (4/2/2022  9:18 AM)

## 2022-04-02 NOTE — NURSING
Upon AM rounding and bedside report, pt stated she would like to wait until her 6 week PP follow up to discuss a BTL. MD notified. EZEKIEL.

## 2022-04-03 VITALS
OXYGEN SATURATION: 100 % | BODY MASS INDEX: 27.68 KG/M2 | HEIGHT: 60 IN | DIASTOLIC BLOOD PRESSURE: 82 MMHG | WEIGHT: 141 LBS | HEART RATE: 112 BPM | SYSTOLIC BLOOD PRESSURE: 134 MMHG | TEMPERATURE: 98 F | RESPIRATION RATE: 16 BRPM

## 2022-04-03 LAB
ALBUMIN SERPL BCP-MCNC: 2.4 G/DL (ref 3.5–5.2)
ALP SERPL-CCNC: 106 U/L (ref 55–135)
ALT SERPL W/O P-5'-P-CCNC: 13 U/L (ref 10–44)
ANION GAP SERPL CALC-SCNC: 12 MMOL/L (ref 8–16)
AST SERPL-CCNC: 21 U/L (ref 10–40)
BASOPHILS # BLD AUTO: 0.01 K/UL (ref 0–0.2)
BASOPHILS NFR BLD: 0.1 % (ref 0–1.9)
BILIRUB SERPL-MCNC: 0.2 MG/DL (ref 0.1–1)
BUN SERPL-MCNC: 5 MG/DL (ref 6–20)
CALCIUM SERPL-MCNC: 8.4 MG/DL (ref 8.7–10.5)
CHLORIDE SERPL-SCNC: 105 MMOL/L (ref 95–110)
CO2 SERPL-SCNC: 21 MMOL/L (ref 23–29)
CREAT SERPL-MCNC: 0.6 MG/DL (ref 0.5–1.4)
DIFFERENTIAL METHOD: ABNORMAL
EOSINOPHIL # BLD AUTO: 0.2 K/UL (ref 0–0.5)
EOSINOPHIL NFR BLD: 1.5 % (ref 0–8)
ERYTHROCYTE [DISTWIDTH] IN BLOOD BY AUTOMATED COUNT: 16.1 % (ref 11.5–14.5)
EST. GFR  (AFRICAN AMERICAN): >60 ML/MIN/1.73 M^2
EST. GFR  (NON AFRICAN AMERICAN): >60 ML/MIN/1.73 M^2
GLUCOSE SERPL-MCNC: 115 MG/DL (ref 70–110)
HCT VFR BLD AUTO: 22.7 % (ref 37–48.5)
HGB BLD-MCNC: 7.3 G/DL (ref 12–16)
IMM GRANULOCYTES # BLD AUTO: 0.25 K/UL (ref 0–0.04)
IMM GRANULOCYTES NFR BLD AUTO: 2.2 % (ref 0–0.5)
LYMPHOCYTES # BLD AUTO: 1.9 K/UL (ref 1–4.8)
LYMPHOCYTES NFR BLD: 17.1 % (ref 18–48)
MCH RBC QN AUTO: 26.9 PG (ref 27–31)
MCHC RBC AUTO-ENTMCNC: 32.2 G/DL (ref 32–36)
MCV RBC AUTO: 84 FL (ref 82–98)
MONOCYTES # BLD AUTO: 0.8 K/UL (ref 0.3–1)
MONOCYTES NFR BLD: 7.5 % (ref 4–15)
NEUTROPHILS # BLD AUTO: 8.1 K/UL (ref 1.8–7.7)
NEUTROPHILS NFR BLD: 71.6 % (ref 38–73)
NRBC BLD-RTO: 0 /100 WBC
PLATELET # BLD AUTO: 201 K/UL (ref 150–450)
PMV BLD AUTO: 9.7 FL (ref 9.2–12.9)
POTASSIUM SERPL-SCNC: 3.4 MMOL/L (ref 3.5–5.1)
PROT SERPL-MCNC: 5.1 G/DL (ref 6–8.4)
RBC # BLD AUTO: 2.71 M/UL (ref 4–5.4)
SODIUM SERPL-SCNC: 138 MMOL/L (ref 136–145)
WBC # BLD AUTO: 11.27 K/UL (ref 3.9–12.7)

## 2022-04-03 PROCEDURE — 25000003 PHARM REV CODE 250: Performed by: STUDENT IN AN ORGANIZED HEALTH CARE EDUCATION/TRAINING PROGRAM

## 2022-04-03 PROCEDURE — 99024 POSTOP FOLLOW-UP VISIT: CPT | Mod: ,,, | Performed by: STUDENT IN AN ORGANIZED HEALTH CARE EDUCATION/TRAINING PROGRAM

## 2022-04-03 PROCEDURE — 80053 COMPREHEN METABOLIC PANEL: CPT | Performed by: STUDENT IN AN ORGANIZED HEALTH CARE EDUCATION/TRAINING PROGRAM

## 2022-04-03 PROCEDURE — 91300 PHARM REV CODE 636 W HCPCS: CPT | Performed by: OBSTETRICS & GYNECOLOGY

## 2022-04-03 PROCEDURE — 63600175 PHARM REV CODE 636 W HCPCS: Performed by: OBSTETRICS & GYNECOLOGY

## 2022-04-03 PROCEDURE — 99024 PR POST-OP FOLLOW-UP VISIT: ICD-10-PCS | Mod: ,,, | Performed by: STUDENT IN AN ORGANIZED HEALTH CARE EDUCATION/TRAINING PROGRAM

## 2022-04-03 PROCEDURE — 36415 COLL VENOUS BLD VENIPUNCTURE: CPT | Performed by: STUDENT IN AN ORGANIZED HEALTH CARE EDUCATION/TRAINING PROGRAM

## 2022-04-03 PROCEDURE — 0001A HC IMMUNIZ ADMIN, SARS-COV-2 COVID-19 VACC, 30MCG/0.3ML, 1ST DOSE: CPT | Performed by: OBSTETRICS & GYNECOLOGY

## 2022-04-03 PROCEDURE — 85025 COMPLETE CBC W/AUTO DIFF WBC: CPT | Performed by: STUDENT IN AN ORGANIZED HEALTH CARE EDUCATION/TRAINING PROGRAM

## 2022-04-03 PROCEDURE — 25000003 PHARM REV CODE 250

## 2022-04-03 RX ADMIN — PRENATAL VIT W/ FE FUMARATE-FA TAB 27-0.8 MG 1 TABLET: 27-0.8 TAB at 09:04

## 2022-04-03 RX ADMIN — DOCUSATE SODIUM 200 MG: 100 CAPSULE, LIQUID FILLED ORAL at 09:04

## 2022-04-03 RX ADMIN — IBUPROFEN 600 MG: 600 TABLET ORAL at 12:04

## 2022-04-03 RX ADMIN — IBUPROFEN 600 MG: 600 TABLET ORAL at 09:04

## 2022-04-03 RX ADMIN — BNT162B2 0.3 ML: 0.23 INJECTION, SUSPENSION INTRAMUSCULAR at 02:04

## 2022-04-03 RX ADMIN — Medication 1 EACH: at 09:04

## 2022-04-03 NOTE — PROGRESS NOTES
.  POSTPARTUM PROGRESS NOTE    Subjective:     PPD/POD#: 2   Procedure:    EGA: 39w1d   N/V: No   F/C: No   Abd Pain: Mild, well-controlled with oral pain medication   Lochia: Mild   Voiding: Yes   Ambulating: Yes   Bowel fnc: No   Breastfeeding: Yes   Contraception: Desires interval BTL    Circumcision: Consented.  Needs to be examined by OB attending.     Objective:      Temp:  [98.1 °F (36.7 °C)-98.5 °F (36.9 °C)] 98.5 °F (36.9 °C)  Pulse:  [] 116  Resp:  [16-18] 18  SpO2:  [97 %-98 %] 98 %  BP: (107-131)/(56-73) 124/59    Lung: Normal respiratory effort   Abdomen: Soft, appropriately tender   Uterus: Firm, no fundal tenderness   Incision: N/A   : Deferred   Extremities: Bilateral trace edema     Lab Review    No results for input(s): NA, K, CL, CO2, BUN, CREATININE, GLU, PROT, BILITOT, ALKPHOS, ALT, AST, MG, PHOS in the last 168 hours.    Recent Labs   Lab 22  0559 22  0437   WBC 8.71 12.88*   HGB 10.1* 7.3*   HCT 31.5* 22.9*   MCV 83 83    186         I/O    Intake/Output Summary (Last 24 hours) at 4/3/2022 0608  Last data filed at 2022 1346  Gross per 24 hour   Intake --   Output 1200 ml   Net -1200 ml        Assessment and Plan:   Postpartum care:  - Patient doing well.  - Continue routine management and advances.    Anemia   - H/H as above   - AM CBC  7.3/23  - Pt declined blood>asymptomatic   - Declines fatigue, dizziness, SOB, headaches  - fe/colace    Undesired fertility   - medicaid tubal consents signed 22  - patient desires interval tubal      Ashley Martinez MD  OBGYN PGY-1

## 2022-04-03 NOTE — DISCHARGE SUMMARY
Delivery Discharge Summary  Obstetrics      Primary OB Clinician: Milton Sanchez MD      Admission date: 2022  Discharge date: 2022    Disposition: To home, self care    Discharge Diagnosis List:      Patient Active Problem List   Diagnosis    Condyloma    Status post vaginal delivery    Nexplanon removal    History of prior pregnancy with short cervix, currently pregnant    History of cardiac arrhythmia    Pelvic pain affecting pregnancy in second trimester, antepartum    Pregnancy related hip pain, antepartum    Abnormal fetal ultrasound    Short cervix affecting pregnancy    Pregnancy related low back pain, antepartum    36 weeks gestation of pregnancy    Threatened  labor, third trimester    H/O shoulder dystocia in prior pregnancy, currently pregnant, third trimester    Anemia affecting pregnancy in third trimester     (spontaneous vaginal delivery)       Procedure:     Hospital Course:  Barbie Kumar is a 31 y.o. now , PPD #2 who was admitted on 2022 at 39w1d for IOL. Patient was subsequently admitted to labor and delivery unit with signed consents.     Labor course was uncomplicated and resulted in  without complications.       Please see delivery note for further details. Her postpartum course was uncomplicated. On discharge day, patient's pain is controlled with oral pain medications. Pt is tolerating ambulation without SOB or CP, and regular diet without N/V. Reports lochia is mild. Denies any HA, vision changes, F/C, LE swelling. Denies any breast pain/soreness. Pt desires interval tubal for contraceptino     Pt in stable condition and ready for discharge. She has been instructed to start and/or continue medications and follow up with her obstetrics provider as listed below.    Pertinent studies:  CBC  Recent Labs   Lab 22  0559 22  0437   WBC 8.71 12.88*   HGB 10.1* 7.3*   HCT 31.5* 22.9*   MCV 83 83    186          There is no  immunization history for the selected administration types on file for this patient.     Delivery:    Episiotomy:     Lacerations: 2nd   Repair suture:     Repair # of packets: 2   Blood loss (ml):       Birth information:  YOB: 2022   Time of birth: 5:04 PM   Sex: male   Delivery type: Vaginal, Spontaneous   Gestational Age: 39w1d    Delivery Clinician:      Other providers:       Additional  information:  Forceps:    Vacuum:    Breech:    Observed anomalies      Living?:           APGARS  One minute Five minutes Ten minutes   Skin color:         Heart rate:         Grimace:         Muscle tone:         Breathing:         Totals: 8  9        Placenta: Delivered:       appearance      Patient Instructions:   Current Discharge Medication List      START taking these medications    Details   ibuprofen (ADVIL,MOTRIN) 600 MG tablet Take 1 tablet (600 mg total) by mouth every 6 (six) hours as needed for Pain.  Qty: 30 tablet, Refills: 2      senna (SENOKOT) 8.6 mg tablet Take 1 tablet by mouth daily as needed for Constipation.  Qty: 30 tablet, Refills: 2         CONTINUE these medications which have NOT CHANGED    Details   ferrous gluconate (FERGON) 324 MG tablet Take 1 tablet (324 mg total) by mouth 2 (two) times a day.  Qty: 90 tablet, Refills: 1         STOP taking these medications       hydrocortisone 2.5 % cream Comments:   Reason for Stopping:         prenatal vit/iron fum/folic ac (PRENATAL 1+1 ORAL) Comments:   Reason for Stopping:               Discharge Procedure Orders   Diet Adult Regular     Diet Adult Regular     Sponge bath only until clinic visit     Keep surgical extremity elevated     Ice to affected area     Lifting restrictions     Other restrictions (specify):   Order Comments: Notify MD if bleeding 1 pad/hour for 2 consecutive hours.     No driving until:   Order Comments: Do not drive while taking narcotics.     Pelvic Rest   Order Comments: Pelvic rest until cleared by MD. Nothing  in vagina till cleared by MD including tampons, douching, intercourse     No dressing needed     Notify your health care provider if you experience any of the following:  temperature >100.4     Notify your health care provider if you experience any of the following:  persistent nausea and vomiting or diarrhea     Notify your health care provider if you experience any of the following:  severe uncontrolled pain     Notify your health care provider if you experience any of the following:  redness, tenderness, or signs of infection (pain, swelling, redness, odor or green/yellow discharge around incision site)     Notify your health care provider if you experience any of the following:  difficulty breathing or increased cough     Notify your health care provider if you experience any of the following:  severe persistent headache     Notify your health care provider if you experience any of the following:  worsening rash     Notify your health care provider if you experience any of the following:  persistent dizziness, light-headedness, or visual disturbances     Notify your health care provider if you experience any of the following:  increased confusion or weakness     Notify your health care provider if you experience any of the following:   Order Comments: Notify MD if bleeding 1 pad/hour for 2 consecutive hours.     Sponge bath only until clinic visit     Keep surgical extremity elevated     Ice to affected area     Lifting restrictions     Other restrictions (specify):   Order Comments: Notify MD if bleeding 1 pad/hour for 2 consecutive hours.     No driving until:   Order Comments: Do not drive while taking narcotics.     Pelvic Rest   Order Comments: Pelvic rest until cleared by MD. Nothing in vagina till cleared by MD including tampons, douching, intercourse     No dressing needed     Notify your health care provider if you experience any of the following:  temperature >100.4     Notify your health care provider  if you experience any of the following:  persistent nausea and vomiting or diarrhea     Notify your health care provider if you experience any of the following:  severe uncontrolled pain     Notify your health care provider if you experience any of the following:  redness, tenderness, or signs of infection (pain, swelling, redness, odor or green/yellow discharge around incision site)     Notify your health care provider if you experience any of the following:  difficulty breathing or increased cough     Notify your health care provider if you experience any of the following:  severe persistent headache     Notify your health care provider if you experience any of the following:  worsening rash     Notify your health care provider if you experience any of the following:  persistent dizziness, light-headedness, or visual disturbances     Notify your health care provider if you experience any of the following:  increased confusion or weakness     Notify your health care provider if you experience any of the following:   Order Comments: Notify MD if bleeding 1 pad/hour for 2 consecutive hours.     Activity as tolerated     Shower on day dressing removed (No bath)     Weight bearing restrictions (specify):     Activity as tolerated     Shower on day dressing removed (No bath)     Weight bearing restrictions (specify):        Follow-up Information     Milton Sanchez MD Follow up in 6 week(s).    Specialties: Obstetrics, Obstetrics and Gynecology  Why: 6 weekPP follow up  Contact information:  [address] [id number] [phone]                          Ashley Martinez MD  OBGYN PGY-1

## 2022-04-03 NOTE — LACTATION NOTE
Lactation discharge education completed. Plan of care is for pt to follow basic breastfeeding education, frequent feeding on demand, and to monitor baby's voids and stools. Breastfeeding guide, including First Alert survey, resource list, and lactation warmline phone number reviewed. LC stressed the importance of stimulating breast at least eight times in twenty-four hours. LC reviewed nutritive versus non-nutritive feeding. Pt acknowledged understanding. LC encouraged Pt to call for assistance with next feeding. Pt to notify doctor for maternal or infant concerns, as reviewed with LC. Pt verbalizes understanding and questions answered.

## 2022-04-03 NOTE — NURSING
RN notified Dr. Foley that pt's repeat pulse was 116, pt denied any s/s of distress.  Doctor told RN that he came to the pt's room to discuss blood but pt refused at the moment. Doctor said to keep him updated if something changes with pt.

## 2022-04-03 NOTE — LACTATION NOTE
04/02/22 1222   Maternal Assessment   Breast Shape Bilateral:;round   Breast Density soft;Bilateral:   Areola elastic;Bilateral:   Nipples Bilateral:;everted;retracting   Maternal Infant Feeding   Maternal Emotional State relaxed   Infant Positioning cradle;cross-cradle   Nipple Shape After Feeding, Right round   Latch Assistance yes   1222: Pt positioning baby in cradle position and swaddled. Pt open to feeding baby skin to skin and shifting to cross cradle position. Baby sucking with lips and chin quiver noted. LC assisted with hand expression. Baby reluctant to spoon feed. LC provided drops of colostrum. Baby pursed lips. Lactation Basics education completed. LC reviewed Breastfeeding Guide and encouraged tracking feeds and output. Encouraged use of STS, frequent feeds based on baby's cue, and avoiding artificial nipples. Pt verbalized understanding and questions answered. LC encouraged Pt to call for assistance with next feeding.  1732: Lactation Round: LC asked for updates on feedings. Pt shared one feeding went well; however, the other feeding was similar to feeding LC observed. LC reviewed signs of contentment. LC encouraged Pt to request pump initiation if baby's feedings are inconsistent. LC discussed donor milk as an option for supplementation. Pt acknowledged understanding. All questions answered.

## 2022-04-03 NOTE — NURSING
RN notified doctor that pt's pulse was 135, pt. Denied any SOB, dizziness, or any s/s of distress.  Doctor said he will come to talk c pt about possibly getting blood

## 2022-04-03 NOTE — PLAN OF CARE
One mildly elevated BP noted, VS stable otherwise. Most recent /82 @ 1206. Pain controlled with scheduled oral pain medication. Fundus firm and midline with light lochia rubra. Voiding spontaneously with adequate output. Passing gas. First dose of covid vaccine administered, educated pt on follow-up appt for 2nd dose. Discharge orders in per OB. Discharge instructions and mother/baby care guide reviewed, pt verbalized understanding. Educated pt on maternal warning signs & when to contact HCP, handout given. No concerns at this time.

## 2022-04-17 ENCOUNTER — PATIENT MESSAGE (OUTPATIENT)
Dept: OBSTETRICS AND GYNECOLOGY | Facility: CLINIC | Age: 32
End: 2022-04-17
Payer: OTHER GOVERNMENT

## 2022-04-20 ENCOUNTER — POSTPARTUM VISIT (OUTPATIENT)
Dept: OBSTETRICS AND GYNECOLOGY | Facility: CLINIC | Age: 32
End: 2022-04-20
Payer: OTHER GOVERNMENT

## 2022-04-20 VITALS — WEIGHT: 124.56 LBS | HEIGHT: 61 IN | BODY MASS INDEX: 23.52 KG/M2

## 2022-04-20 PROCEDURE — 99999 PR PBB SHADOW E&M-EST. PATIENT-LVL III: CPT | Mod: PBBFAC,,, | Performed by: OBSTETRICS & GYNECOLOGY

## 2022-04-20 PROCEDURE — 99999 PR PBB SHADOW E&M-EST. PATIENT-LVL III: ICD-10-PCS | Mod: PBBFAC,,, | Performed by: OBSTETRICS & GYNECOLOGY

## 2022-04-20 PROCEDURE — 99213 OFFICE O/P EST LOW 20 MIN: CPT | Mod: PBBFAC,PN | Performed by: OBSTETRICS & GYNECOLOGY

## 2022-04-20 PROCEDURE — 0503F PR POSTPARTUM CARE VISIT: ICD-10-PCS | Mod: ,,, | Performed by: OBSTETRICS & GYNECOLOGY

## 2022-04-20 PROCEDURE — 0503F POSTPARTUM CARE VISIT: CPT | Mod: ,,, | Performed by: OBSTETRICS & GYNECOLOGY

## 2022-04-20 NOTE — PROGRESS NOTES
"CC: INCISION CHECK    HPI:  Barbie Kumar is a 31 y.o. female  who presents for a mood check.  She is 2.5 weeks S/P a  on 22.  She describes having only slight perineal discomfort with certain movements.  No fever.   No bowel / bladder complaints.  She is converting to bottle feeding and notes some bilateral breast tenderness.  The low back / hip / symphysis pain that she had during pregnancy has resolved.  [place] and will [place] 20[place].  She describes increasing mood swings, anxiety, depression, and crying episodes.  Denies h/s ideations.  She has seen a counselor in the past and plans to return.  At this time, she declines medication to help with her mood.  She would like to return part time to work next week.  Her baby is doing well.     Delivery Date: 22  Delivery MD: Dr. 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  To let us know if she has trouble scheduling  Declines medication for mood at this time  She understands the need to contact for worsening mood    Instructions / precautions reviewed    She plans begin part time work next week    PLAN:  Return: 4 weeks for PP check      "

## 2022-04-22 ENCOUNTER — SOCIAL WORK (OUTPATIENT)
Dept: OBSTETRICS AND GYNECOLOGY | Facility: OTHER | Age: 32
End: 2022-04-22
Payer: OTHER GOVERNMENT

## 2022-04-22 NOTE — PROGRESS NOTES
SW contacted pt after referral received from OB clinic. SW explained SW roles and pt verbalized understanding. Pt confirmed she is currently seeing a therapist but would like a new therapist with [facility]. SANDRINE spoke with Dr. Stephane VILLA to determine if any appointments are available. SW contacted pt with available date and times and pt appointment scheduled.

## 2022-06-17 ENCOUNTER — PATIENT MESSAGE (OUTPATIENT)
Dept: ADMINISTRATIVE | Facility: HOSPITAL | Age: 32
End: 2022-06-17
Payer: OTHER GOVERNMENT

## 2022-06-29 ENCOUNTER — TELEPHONE (OUTPATIENT)
Dept: OBSTETRICS AND GYNECOLOGY | Facility: CLINIC | Age: 32
End: 2022-06-29

## 2022-06-30 ENCOUNTER — DOCUMENTATION ONLY (OUTPATIENT)
Dept: OBSTETRICS AND GYNECOLOGY | Facility: CLINIC | Age: 32
End: 2022-06-30
Payer: OTHER GOVERNMENT

## 2022-06-30 NOTE — PROGRESS NOTES
Patient returned to office to resign [facility] BTL papers.    BTL papers previously signed on 1/11/2022.    She understands that a tubal ligation is a permanent, irreversible method of sterilization.    Tubal scheduled for 8/5/22.

## 2022-07-29 ENCOUNTER — HOSPITAL ENCOUNTER (OUTPATIENT)
Dept: PREADMISSION TESTING | Facility: OTHER | Age: 32
Discharge: HOME | End: 2022-07-29
Attending: OBSTETRICS & GYNECOLOGY
Payer: OTHER GOVERNMENT

## 2022-07-29 ENCOUNTER — TELEPHONE (OUTPATIENT)
Dept: OBSTETRICS AND GYNECOLOGY | Facility: CLINIC | Age: 32
End: 2022-07-29
Payer: OTHER GOVERNMENT

## 2022-07-29 ENCOUNTER — OFFICE VISIT (OUTPATIENT)
Dept: OBSTETRICS AND GYNECOLOGY | Facility: CLINIC | Age: 32
End: 2022-07-29
Attending: OBSTETRICS & GYNECOLOGY
Payer: OTHER GOVERNMENT

## 2022-07-29 VITALS
HEIGHT: 60 IN | SYSTOLIC BLOOD PRESSURE: 114 MMHG | HEART RATE: 77 BPM | WEIGHT: 110 LBS | OXYGEN SATURATION: 100 % | BODY MASS INDEX: 21.6 KG/M2 | DIASTOLIC BLOOD PRESSURE: 69 MMHG | RESPIRATION RATE: 16 BRPM | TEMPERATURE: 98 F

## 2022-07-29 VITALS
DIASTOLIC BLOOD PRESSURE: 66 MMHG | WEIGHT: 109.81 LBS | SYSTOLIC BLOOD PRESSURE: 110 MMHG | BODY MASS INDEX: 20.73 KG/M2 | HEIGHT: 61 IN

## 2022-07-29 DIAGNOSIS — Z30.2 ENCOUNTER FOR STERILIZATION: Primary | ICD-10-CM

## 2022-07-29 DIAGNOSIS — Z01.818 PREOP TESTING: Primary | ICD-10-CM

## 2022-07-29 LAB
ABO + RH BLD: NORMAL
BASOPHILS # BLD AUTO: 0.03 K/UL (ref 0–0.2)
BASOPHILS NFR BLD: 0.5 % (ref 0–1.9)
BLD GP AB SCN CELLS X3 SERPL QL: NORMAL
DIFFERENTIAL METHOD: ABNORMAL
EOSINOPHIL # BLD AUTO: 0.5 K/UL (ref 0–0.5)
EOSINOPHIL NFR BLD: 7.3 % (ref 0–8)
ERYTHROCYTE [DISTWIDTH] IN BLOOD BY AUTOMATED COUNT: 15.8 % (ref 11.5–14.5)
HCT VFR BLD AUTO: 38.5 % (ref 37–48.5)
HGB BLD-MCNC: 12.2 G/DL (ref 12–16)
IMM GRANULOCYTES # BLD AUTO: 0.01 K/UL (ref 0–0.04)
IMM GRANULOCYTES NFR BLD AUTO: 0.2 % (ref 0–0.5)
LYMPHOCYTES # BLD AUTO: 2 K/UL (ref 1–4.8)
LYMPHOCYTES NFR BLD: 31.3 % (ref 18–48)
MCH RBC QN AUTO: 26 PG (ref 27–31)
MCHC RBC AUTO-ENTMCNC: 31.7 G/DL (ref 32–36)
MCV RBC AUTO: 82 FL (ref 82–98)
MONOCYTES # BLD AUTO: 0.6 K/UL (ref 0.3–1)
MONOCYTES NFR BLD: 9.7 % (ref 4–15)
NEUTROPHILS # BLD AUTO: 3.2 K/UL (ref 1.8–7.7)
NEUTROPHILS NFR BLD: 51 % (ref 38–73)
NRBC BLD-RTO: 0 /100 WBC
PLATELET # BLD AUTO: 267 K/UL (ref 150–450)
PMV BLD AUTO: 9.7 FL (ref 9.2–12.9)
RBC # BLD AUTO: 4.7 M/UL (ref 4–5.4)
WBC # BLD AUTO: 6.26 K/UL (ref 3.9–12.7)

## 2022-07-29 PROCEDURE — 36415 COLL VENOUS BLD VENIPUNCTURE: CPT | Performed by: ANESTHESIOLOGY

## 2022-07-29 PROCEDURE — 86900 BLOOD TYPING SEROLOGIC ABO: CPT | Performed by: ANESTHESIOLOGY

## 2022-07-29 PROCEDURE — 99499 UNLISTED E&M SERVICE: CPT | Mod: S$PBB,,, | Performed by: OBSTETRICS & GYNECOLOGY

## 2022-07-29 PROCEDURE — 99999 PR PBB SHADOW E&M-EST. PATIENT-LVL III: CPT | Mod: PBBFAC,,, | Performed by: OBSTETRICS & GYNECOLOGY

## 2022-07-29 PROCEDURE — 85025 COMPLETE CBC W/AUTO DIFF WBC: CPT | Performed by: ANESTHESIOLOGY

## 2022-07-29 PROCEDURE — 99999 PR PBB SHADOW E&M-EST. PATIENT-LVL III: ICD-10-PCS | Mod: PBBFAC,,, | Performed by: OBSTETRICS & GYNECOLOGY

## 2022-07-29 PROCEDURE — 99213 OFFICE O/P EST LOW 20 MIN: CPT | Mod: PBBFAC | Performed by: OBSTETRICS & GYNECOLOGY

## 2022-07-29 PROCEDURE — 99499 NO LOS: ICD-10-PCS | Mod: S$PBB,,, | Performed by: OBSTETRICS & GYNECOLOGY

## 2022-07-29 PROCEDURE — 86850 RBC ANTIBODY SCREEN: CPT | Performed by: ANESTHESIOLOGY

## 2022-07-29 RX ORDER — SODIUM CHLORIDE, SODIUM LACTATE, POTASSIUM CHLORIDE, CALCIUM CHLORIDE 600; 310; 30; 20 MG/100ML; MG/100ML; MG/100ML; MG/100ML
INJECTION, SOLUTION INTRAVENOUS CONTINUOUS
Status: CANCELLED | OUTPATIENT
Start: 2022-07-29

## 2022-07-29 RX ORDER — LIDOCAINE HYDROCHLORIDE 10 MG/ML
0.5 INJECTION, SOLUTION EPIDURAL; INFILTRATION; INTRACAUDAL; PERINEURAL ONCE
Status: CANCELLED | OUTPATIENT
Start: 2022-07-29 | End: 2022-07-29

## 2022-07-29 RX ORDER — ACETAMINOPHEN 500 MG
1000 TABLET ORAL
Status: CANCELLED | OUTPATIENT
Start: 2022-07-29 | End: 2022-07-29

## 2022-07-29 RX ORDER — DEXTROSE, SODIUM CHLORIDE, SODIUM LACTATE, POTASSIUM CHLORIDE, AND CALCIUM CHLORIDE 5; .6; .31; .03; .02 G/100ML; G/100ML; G/100ML; G/100ML; G/100ML
INJECTION, SOLUTION INTRAVENOUS CONTINUOUS
Status: CANCELLED | OUTPATIENT
Start: 2022-07-29

## 2022-07-29 NOTE — DISCHARGE INSTRUCTIONS
Information to Prepare you for your Surgery    PRE-ADMIT TESTING -  [phone]    [address] [facility]          Your surgery has been scheduled at [facility]. We are pleased to have the opportunity to serve you. For Further Information please call [phone].    On the day of surgery please report to the Information Desk on the 1st floor.    CONTACT YOUR PHYSICIAN'S OFFICE THE DAY PRIOR TO YOUR SURGERY TO OBTAIN YOUR ARRIVAL TIME.     The evening before surgery do not eat anything after 9 p.m. ( this includes hard candy, chewing gum and mints).  You may only have GATORADE, POWERADE AND WATER  from 9 p.m. until you leave your home.   DO NOT DRINK ANY LIQUIDS ON THE WAY TO THE HOSPITAL.      Why does your anesthesiologist allow you to drink Gatorade/Powerade before surgery?  Gatorade/Powerade helps to increase your comfort before surgery and to decrease your nausea after surgery. The carbohydrates in Gatorade/Powerade help reduce your body's stress response to surgery.  If you are a diabetic-drink only water prior to surgery.      Current Visitor policy(12/27/2021) - Patients may have 2 visitors pre and post procedure. Only 2 visitors will be allowed in the Surgical building with the patient.     SPECIAL MEDICATION INSTRUCTIONS: TAKE medications checked off by the Anesthesiologist on your Medication List.    Angiogram Patients: Take medications as instructed by your physician, including aspirin.     Surgery Patients:    If you take ASPIRIN - Your PHYSICIAN/SURGEON will need to inform you IF/OR when you need to stop taking aspirin prior to your surgery.     Do Not take any medications containing IBUPROFEN.    Do Not Wear any make-up (especially eye make-up) to surgery. Please remove any false eyelashes or eyelash extensions. If you arrive the day of surgery with makeup/eyelashes on you will be required to remove prior to surgery. (There is a risk of corneal  abrasions if eye makeup/eyelash extensions are not removed)      Leave all valuables at home.   Do Not wear any jewelry or watches, including any metal in body piercings. Jewelry must be removed prior to coming to the hospital.  There is a possibility that rings that are unable to be removed may be cut off if they are on the surgical extremity.    Please remove all hair extensions, wigs, clips and any other metal accessories/ ornaments from your hair.  These items may pose a flammable/fire risk in Surgery and must be removed.    Do not shave your surgical area at least 5 days prior to your surgery. The surgical prep will be performed at the hospital according to Infection Control regulations.    Contact Lens must be removed before surgery. Either do not wear the contact lens or bring a case and solution for storage.  Please bring a container for eyeglasses or dentures as required.  Bring any paperwork your physician has provided, such as consent forms,  history and physicals, doctor's orders, etc.   Bring comfortable clothes that are loose fitting to wear upon discharge. Take into consideration the type of surgery being performed.  Maintain your diet as advised per your physician the day prior to surgery.      Adequate rest the night before surgery is advised.   Park in the Parking lot behind the hospital or in the [facility] across the street from the parking lot. Parking is complimentary.  If you will be discharged the same day as your procedure, please arrange for a responsible adult to drive you home or to accompany you if traveling by taxi.   YOU WILL NOT BE PERMITTED TO DRIVE OR TO LEAVE THE HOSPITAL ALONE AFTER SURGERY.   If you are being discharged the same day, it is strongly recommended that you arrange for someone to remain with you for the first 24 hrs following your surgery.    The Surgeon will speak to your family/visitor after your surgery regarding the outcome of your surgery and post op  care.  The Surgeon may speak to you after your surgery, but there is a possibility you may not remember the details.  Please check with your family members regarding the conversation with the Surgeon.    We strongly recommend whoever is bringing you home be present for discharge instructions.  This will ensure a thorough understanding for your post op home care.    ALL CHILDREN MUST ALWAYS BE ACCOMPANIED BY AN ADULT.    Visitors-Refer to current Visitor policy handouts.    Thank you for your cooperation.  The Staff of [facility].            Bathing Instructions with Hibiclens    Shower the evening before and morning of your procedure with Hibiclens:  Wash your face with water and your regular face wash/soap  Apply Hibiclens directly on your skin or on a wet washcloth and wash gently. When showering: Move away from the shower stream when applying Hibiclens to avoid rinsing off too soon.  Rinse thoroughly with warm water  Do not dilute Hibiclens        Dry off as usual, do not use any deodorant, powder, body lotions, perfume, after shave or cologne.

## 2022-07-29 NOTE — PROGRESS NOTES
"C.C Pre-op Exam      HPI : Barbie Kumar is a 31 y.o. female  for evaluation and discussion of treatment options for sterilization.  She is now four months S/P  on 22.  She and the baby are doing well.  Bottle feeding.  She has considered her contraceptive options and wants to proceed with sterilization.       21 Pap: Negative, HPV: Negative      Past Medical History:   Diagnosis Date    Abnormal Pap smear     Arrhythmia     Condyloma     Tuberculosis      Past Surgical History:   Procedure Laterality Date    NO PAST SURGERIES       Family History   Problem Relation Age of Onset    Hypertension Paternal Grandmother     Diabetes Paternal Grandmother     Hypertension Father     Diabetes Mother     Hypertension Maternal Grandmother     Diabetes Maternal Grandmother     Hypertension Maternal Grandfather     Diabetes Maternal Grandfather     Hypertension Paternal Grandfather     Diabetes Paternal Grandfather     Ovarian cancer Neg Hx     Colon cancer Neg Hx     Breast cancer Neg Hx     Stroke Neg Hx      Social History     Tobacco Use    Smoking status: Never Smoker    Smokeless tobacco: Never Used   Substance Use Topics    Alcohol use: No     Alcohol/week: 0.0 standard drinks    Drug use: No     OB History    Para Term  AB Living   2 2 2     2   SAB IAB Ectopic Multiple Live Births         0 2      # Outcome Date GA Lbr Morgan/2nd Weight Sex Delivery Anes PTL Lv   2 Term 22 39w1d  3.54 kg (7 lb 12.9 oz) M Vag-Spont EPI N BIRGIT      Complications: Fetal Intolerance   1 Term 10/18/15 38w3d / 00:56 3.118 kg (6 lb 14 oz) F Vag-Spont EPI N BIRGIT      Birth Comments: The pregnancy was complicated by condyloma, shortened cervix, and prolonged rupture of membranes. Delivery was complicated by PROM and decreased fetal heart rate necessitating vacuum assist. R Shoulder dystocia. Also with terminal meconium.       /66   Ht 5' 1" (1.549 m)   Wt 49.8 kg (109 lb 12.6 oz) "   LMP 07/13/2022   Breastfeeding No   BMI 20.74 kg/m²         ASSESSMENT;  1. Encounter for sterilization    We discussed her desire for sterilization.  She understands that this is a permanent, irreversible procedure with 1/200 risk of failure, increased risk of ectopics, and PTS.  We reviewed the various techniques for sterilization:  LBTL and bilateral salpingectomy: pros / cons.  We discussed salpingectomy as a possible way to reduce her lifetime risk of ovarian cancer.  We allso discussed other methods of contraception: barrier, hormonal (OCPs, Evra patches, NuvaRing, Depo-Provera, Nexplanon), IUDs, etc.  She is aware that Mirena and Nexplanon have similar contraceptive effective rates as sterilization.  Understanding the above, she would like to proceed with LBTL.  We have discussed the risks, benefits, indications, and alternatives of the procedure in detail.  The patient verbalizes her understanding.  All questions answered.  Consents signed.  The patient agrees to proceed to proceed as planned.    PLAN:  LBTL 8/5/22

## 2022-07-29 NOTE — H&P
"C.C Undesired fertity      HPI : Barbie Kumar is a 31 y.o. female  for LBTL for the treatment options for undesired fertility.  She is now four months S/P  on 22.  She and the baby are doing well.  Bottle feeding.  She has considered her contraceptive options and wants to proceed with sterilization.       21 Pap: Negative, HPV: Negative      Past Medical History:   Diagnosis Date    Abnormal Pap smear     Arrhythmia     Condyloma     Tuberculosis      Past Surgical History:   Procedure Laterality Date    NO PAST SURGERIES       Family History   Problem Relation Age of Onset    Hypertension Paternal Grandmother     Diabetes Paternal Grandmother     Hypertension Father     Diabetes Mother     Hypertension Maternal Grandmother     Diabetes Maternal Grandmother     Hypertension Maternal Grandfather     Diabetes Maternal Grandfather     Hypertension Paternal Grandfather     Diabetes Paternal Grandfather     Ovarian cancer Neg Hx     Colon cancer Neg Hx     Breast cancer Neg Hx     Stroke Neg Hx      Social History     Tobacco Use    Smoking status: Never Smoker    Smokeless tobacco: Never Used   Substance Use Topics    Alcohol use: No     Alcohol/week: 0.0 standard drinks    Drug use: No     OB History    Para Term  AB Living   2 2 2     2   SAB IAB Ectopic Multiple Live Births         0 2      # Outcome Date GA Lbr Morgan/2nd Weight Sex Delivery Anes PTL Lv   2 Term 22 39w1d  3.54 kg (7 lb 12.9 oz) M Vag-Spont EPI N BIRGIT      Complications: Fetal Intolerance   1 Term 10/18/15 38w3d / 00:56 3.118 kg (6 lb 14 oz) F Vag-Spont EPI N BIRGIT      Birth Comments: The pregnancy was complicated by condyloma, shortened cervix, and prolonged rupture of membranes. Delivery was complicated by PROM and decreased fetal heart rate necessitating vacuum assist. R Shoulder dystocia. Also with terminal meconium.       /66   Ht 5' 1" (1.549 m)   Wt 49.8 kg (109 lb 12.6 oz)   " LMP 07/13/2022   Breastfeeding No   BMI 20.74 kg/m²     ROS:  GENERAL: Feeling well overall.   SKIN: Denies rash or lesions.   HEAD: Denies head injury or headache.   NODES: Denies enlarged lymph nodes.   CHEST: Denies chest pain or shortness of breath.   CARDIOVASCULAR: Denies palpitations or left sided chest pain.   ABDOMEN: No abdominal pain, nausea, vomiting or rectal bleeding.   URINARY: No frequency, dysuria, hematuria, or burning on urination.  REPRODUCTIVE: See HPI.   BREASTS: Denies pain, lumps, or nipple discharge.   HEMATOLOGIC: No easy bruisability.  MUSCULOSKELETAL: Denies joint pain or swelling.   NEUROLOGIC: Denies syncope or weakness.   PSYCHIATRIC: Mood has significantly improved with counseling.       PHYSICAL EXAM:  (4/1/21)  APPEARANCE: Well nourished, well developed, in no acute distress.  AFFECT: WNL, alert and oriented x 3  SKIN: No acne or hirsutism  NECK: Neck symmetric without masses or thyromegaly  NODES: No inguinal, cervical, axillary, or femoral lymph node enlargement  CHEST: Good respiratory effect  ABDOMEN: Soft.  No tenderness or masses.    EXTREMITIES: No edema.    ASSESSMENT;  1. Encounter for sterilization    We discussed her desire for sterilization.  She understands that this is a permanent, irreversible procedure with 1/200 risk of failure, increased risk of ectopics, and PTS.  We reviewed the various techniques for sterilization:  LBTL and bilateral salpingectomy: pros / cons.  We discussed salpingectomy as a possible way to reduce her lifetime risk of ovarian cancer.  We allso discussed other methods of contraception: barrier, hormonal (OCPs, Evra patches, NuvaRing, Depo-Provera, Nexplanon), IUDs, etc.  She is aware that Mirena and Nexplanon have similar contraceptive effective rates as sterilization.  Understanding the above, she would like to proceed with LBTL.  We have discussed the risks, benefits, indications, and alternatives of the procedure in detail.  The patient  verbalizes her understanding.  All questions answered.  Consents signed.  The patient agrees to proceed to proceed as planned.    PLAN:  LBTL 8/5/22

## 2022-08-01 PROBLEM — O09.299 HISTORY OF PRIOR PREGNANCY WITH SHORT CERVIX, CURRENTLY PREGNANT: Status: RESOLVED | Noted: 2021-08-24 | Resolved: 2022-08-01

## 2022-08-02 ENCOUNTER — TELEPHONE (OUTPATIENT)
Dept: OBSTETRICS AND GYNECOLOGY | Facility: CLINIC | Age: 32
End: 2022-08-02
Payer: OTHER GOVERNMENT

## 2022-08-02 ENCOUNTER — PATIENT MESSAGE (OUTPATIENT)
Dept: SURGERY | Facility: OTHER | Age: 32
End: 2022-08-02
Payer: OTHER GOVERNMENT

## 2022-08-02 NOTE — TELEPHONE ENCOUNTER
Contacted patient and she will contact  to check to see who her next PCP was listed as her previous is no longer here

## 2022-08-02 NOTE — TELEPHONE ENCOUNTER
----- Message from Sree Holt sent at 8/2/2022 11:01 AM CDT -----  Contact: self [phone]  Patient is returning a phone call.  Who left a message for the patient: Barb Pepper MA   Does patient know what this is regarding:    Would you like a call back, or a response through your [facility] portal?: call back  Comments:      Please call and advise

## 2022-08-02 NOTE — TELEPHONE ENCOUNTER
Nory Garcia  [facility] is pending the authorization for surgery because the patient doesn't have a evaluation and treat referral from her PCP to Dr. Sanchez on file with [facility]. The patient will have to contact her PCM (Astrid Ashley L) and have her office submit the referral to [facility]. The surgery authorization will remain on hold until the referral is done.   Please advise patient to contact her PCM to have the referral done.     Nory

## 2022-08-08 ENCOUNTER — PATIENT MESSAGE (OUTPATIENT)
Dept: OBSTETRICS AND GYNECOLOGY | Facility: CLINIC | Age: 32
End: 2022-08-08
Payer: OTHER GOVERNMENT

## 2022-08-12 ENCOUNTER — TELEPHONE (OUTPATIENT)
Dept: OBSTETRICS AND GYNECOLOGY | Facility: CLINIC | Age: 32
End: 2022-08-12
Payer: OTHER GOVERNMENT

## 2022-08-12 DIAGNOSIS — Z30.2 ENCOUNTER FOR STERILIZATION: Primary | ICD-10-CM

## 2022-08-22 ENCOUNTER — PATIENT MESSAGE (OUTPATIENT)
Dept: SURGERY | Facility: OTHER | Age: 32
End: 2022-08-22
Payer: OTHER GOVERNMENT

## 2022-09-01 ENCOUNTER — ANESTHESIA EVENT (OUTPATIENT)
Dept: SURGERY | Facility: OTHER | Age: 32
End: 2022-09-01
Payer: OTHER GOVERNMENT

## 2022-09-01 ENCOUNTER — TELEPHONE (OUTPATIENT)
Dept: OBSTETRICS AND GYNECOLOGY | Facility: CLINIC | Age: 32
End: 2022-09-01
Payer: OTHER GOVERNMENT

## 2022-09-01 NOTE — TELEPHONE ENCOUNTER
Called patient:    Scheduled for LBTL tomorrow.    Discussed sterilization procedures and she declines laparoscopic bilateral salpingectomies and wants to proceed with LBTL.    Pre-op talk.

## 2022-09-01 NOTE — TELEPHONE ENCOUNTER
----- Message from Ryder David sent at 9/1/2022  1:15 PM CDT -----  Regarding: CAll BAck  Name of Who is Calling:ZURI CASTANON [[id number]]              What is the request in detail: Patient requesting a call back No details given Please assist              Can the clinic reply by [facility]: No              What Number to Call Back [facility] not [facility]:[phone]

## 2022-09-01 NOTE — TELEPHONE ENCOUNTER
----- Message from Keerthi Mari sent at 9/1/2022  2:05 PM CDT -----  Name of Who is Calling: ZURI CASTANON [[id number]]           What is the request in detail: Patient is confirming her procedure for tomorrow, 09/02/22.           Can the clinic reply by [facility]: No           What Number to Call Back [facility] not [facility]: [phone]

## 2022-09-02 ENCOUNTER — HOSPITAL ENCOUNTER (OUTPATIENT)
Facility: OTHER | Age: 32
Discharge: HOME | End: 2022-09-02
Attending: OBSTETRICS & GYNECOLOGY | Admitting: OBSTETRICS & GYNECOLOGY
Payer: OTHER GOVERNMENT

## 2022-09-02 ENCOUNTER — TELEPHONE (OUTPATIENT)
Dept: OBSTETRICS AND GYNECOLOGY | Facility: CLINIC | Age: 32
End: 2022-09-02
Payer: OTHER GOVERNMENT

## 2022-09-02 ENCOUNTER — ANESTHESIA (OUTPATIENT)
Dept: SURGERY | Facility: OTHER | Age: 32
End: 2022-09-02
Payer: OTHER GOVERNMENT

## 2022-09-02 DIAGNOSIS — Z90.79 STATUS POST BILATERAL SALPINGECTOMY: Primary | ICD-10-CM

## 2022-09-02 DIAGNOSIS — Z01.818 PREOP TESTING: ICD-10-CM

## 2022-09-02 LAB
B-HCG UR QL: NEGATIVE
CTP QC/QA: YES
CTP QC/QA: YES
SARS-COV-2 AG RESP QL IA.RAPID: NEGATIVE

## 2022-09-02 PROCEDURE — 36000708 HC OR TIME LEV III 1ST 15 MIN: Performed by: OBSTETRICS & GYNECOLOGY

## 2022-09-02 PROCEDURE — 27201423 OPTIME MED/SURG SUP & DEVICES STERILE SUPPLY: Performed by: OBSTETRICS & GYNECOLOGY

## 2022-09-02 PROCEDURE — 25000003 PHARM REV CODE 250: Performed by: NURSE ANESTHETIST, CERTIFIED REGISTERED

## 2022-09-02 PROCEDURE — 58600: ICD-10-PCS | Mod: ,,, | Performed by: OBSTETRICS & GYNECOLOGY

## 2022-09-02 PROCEDURE — 00851 ANES IPER PX TUBAL LIGATION: CPT | Performed by: OBSTETRICS & GYNECOLOGY

## 2022-09-02 PROCEDURE — 36000709 HC OR TIME LEV III EA ADD 15 MIN: Performed by: OBSTETRICS & GYNECOLOGY

## 2022-09-02 PROCEDURE — 71000033 HC RECOVERY, INTIAL HOUR: Performed by: OBSTETRICS & GYNECOLOGY

## 2022-09-02 PROCEDURE — 63600175 PHARM REV CODE 636 W HCPCS: Performed by: NURSE ANESTHETIST, CERTIFIED REGISTERED

## 2022-09-02 PROCEDURE — 71000039 HC RECOVERY, EACH ADD'L HOUR: Performed by: OBSTETRICS & GYNECOLOGY

## 2022-09-02 PROCEDURE — 81025 URINE PREGNANCY TEST: CPT | Performed by: ANESTHESIOLOGY

## 2022-09-02 PROCEDURE — 71000015 HC POSTOP RECOV 1ST HR: Performed by: OBSTETRICS & GYNECOLOGY

## 2022-09-02 PROCEDURE — 37000008 HC ANESTHESIA 1ST 15 MINUTES: Performed by: OBSTETRICS & GYNECOLOGY

## 2022-09-02 PROCEDURE — 71000016 HC POSTOP RECOV ADDL HR: Performed by: OBSTETRICS & GYNECOLOGY

## 2022-09-02 PROCEDURE — 63600175 PHARM REV CODE 636 W HCPCS: Performed by: ANESTHESIOLOGY

## 2022-09-02 PROCEDURE — 37000009 HC ANESTHESIA EA ADD 15 MINS: Performed by: OBSTETRICS & GYNECOLOGY

## 2022-09-02 PROCEDURE — 58600 DIVISION OF FALLOPIAN TUBE: CPT | Mod: ,,, | Performed by: OBSTETRICS & GYNECOLOGY

## 2022-09-02 PROCEDURE — 25000003 PHARM REV CODE 250

## 2022-09-02 RX ORDER — MUPIROCIN 20 MG/G
OINTMENT TOPICAL
Status: DISCONTINUED | OUTPATIENT
Start: 2022-09-02 | End: 2022-09-02 | Stop reason: HOSPADM

## 2022-09-02 RX ORDER — OXYCODONE HYDROCHLORIDE 5 MG/1
5 TABLET ORAL EVERY 4 HOURS PRN
Status: CANCELLED | OUTPATIENT
Start: 2022-09-02

## 2022-09-02 RX ORDER — IBUPROFEN 600 MG/1
600 TABLET ORAL 3 TIMES DAILY
Qty: 60 TABLET | Refills: 0 | Status: SHIPPED | OUTPATIENT
Start: 2022-09-02

## 2022-09-02 RX ORDER — PROPOFOL 10 MG/ML
VIAL (ML) INTRAVENOUS
Status: DISCONTINUED | OUTPATIENT
Start: 2022-09-02 | End: 2022-09-02

## 2022-09-02 RX ORDER — SODIUM CHLORIDE, SODIUM LACTATE, POTASSIUM CHLORIDE, CALCIUM CHLORIDE 600; 310; 30; 20 MG/100ML; MG/100ML; MG/100ML; MG/100ML
INJECTION, SOLUTION INTRAVENOUS CONTINUOUS
Status: DISCONTINUED | OUTPATIENT
Start: 2022-09-02 | End: 2022-09-02 | Stop reason: HOSPADM

## 2022-09-02 RX ORDER — LIDOCAINE HYDROCHLORIDE 10 MG/ML
0.5 INJECTION, SOLUTION EPIDURAL; INFILTRATION; INTRACAUDAL; PERINEURAL ONCE
Status: DISCONTINUED | OUTPATIENT
Start: 2022-09-02 | End: 2022-09-02 | Stop reason: HOSPADM

## 2022-09-02 RX ORDER — ACETAMINOPHEN 500 MG
1000 TABLET ORAL EVERY 6 HOURS PRN
Status: CANCELLED | OUTPATIENT
Start: 2022-09-02

## 2022-09-02 RX ORDER — HYDROCODONE BITARTRATE AND ACETAMINOPHEN 5; 325 MG/1; MG/1
1 TABLET ORAL EVERY 4 HOURS PRN
Qty: 15 TABLET | Refills: 0 | Status: SHIPPED | OUTPATIENT
Start: 2022-09-02

## 2022-09-02 RX ORDER — DEXAMETHASONE SODIUM PHOSPHATE 4 MG/ML
INJECTION, SOLUTION INTRA-ARTICULAR; INTRALESIONAL; INTRAMUSCULAR; INTRAVENOUS; SOFT TISSUE
Status: DISCONTINUED | OUTPATIENT
Start: 2022-09-02 | End: 2022-09-02

## 2022-09-02 RX ORDER — KETOROLAC TROMETHAMINE 30 MG/ML
15 INJECTION, SOLUTION INTRAMUSCULAR; INTRAVENOUS EVERY 6 HOURS PRN
Status: CANCELLED | OUTPATIENT
Start: 2022-09-02 | End: 2022-09-05

## 2022-09-02 RX ORDER — PROCHLORPERAZINE EDISYLATE 5 MG/ML
5 INJECTION INTRAMUSCULAR; INTRAVENOUS EVERY 30 MIN PRN
Status: DISCONTINUED | OUTPATIENT
Start: 2022-09-02 | End: 2022-09-02 | Stop reason: HOSPADM

## 2022-09-02 RX ORDER — SODIUM CHLORIDE 9 MG/ML
INJECTION, SOLUTION INTRAVENOUS CONTINUOUS
Status: DISCONTINUED | OUTPATIENT
Start: 2022-09-02 | End: 2022-09-02 | Stop reason: HOSPADM

## 2022-09-02 RX ORDER — ROCURONIUM BROMIDE 10 MG/ML
INJECTION, SOLUTION INTRAVENOUS
Status: DISCONTINUED | OUTPATIENT
Start: 2022-09-02 | End: 2022-09-02

## 2022-09-02 RX ORDER — MEPERIDINE HYDROCHLORIDE 25 MG/ML
12.5 INJECTION INTRAMUSCULAR; INTRAVENOUS; SUBCUTANEOUS ONCE AS NEEDED
Status: DISCONTINUED | OUTPATIENT
Start: 2022-09-02 | End: 2022-09-02 | Stop reason: HOSPADM

## 2022-09-02 RX ORDER — FENTANYL CITRATE 50 UG/ML
INJECTION, SOLUTION INTRAMUSCULAR; INTRAVENOUS
Status: DISCONTINUED | OUTPATIENT
Start: 2022-09-02 | End: 2022-09-02

## 2022-09-02 RX ORDER — HYDROMORPHONE HYDROCHLORIDE 2 MG/ML
0.2 INJECTION, SOLUTION INTRAMUSCULAR; INTRAVENOUS; SUBCUTANEOUS
Status: CANCELLED | OUTPATIENT
Start: 2022-09-02

## 2022-09-02 RX ORDER — LIDOCAINE HYDROCHLORIDE 20 MG/ML
INJECTION INTRAVENOUS
Status: DISCONTINUED | OUTPATIENT
Start: 2022-09-02 | End: 2022-09-02

## 2022-09-02 RX ORDER — HYDROMORPHONE HYDROCHLORIDE 2 MG/ML
0.4 INJECTION, SOLUTION INTRAMUSCULAR; INTRAVENOUS; SUBCUTANEOUS EVERY 5 MIN PRN
Status: DISCONTINUED | OUTPATIENT
Start: 2022-09-02 | End: 2022-09-02 | Stop reason: HOSPADM

## 2022-09-02 RX ORDER — ONDANSETRON 2 MG/ML
4 INJECTION INTRAMUSCULAR; INTRAVENOUS EVERY 4 HOURS PRN
Status: CANCELLED | OUTPATIENT
Start: 2022-09-02

## 2022-09-02 RX ORDER — HYDROMORPHONE HYDROCHLORIDE 2 MG/ML
INJECTION, SOLUTION INTRAMUSCULAR; INTRAVENOUS; SUBCUTANEOUS
Status: DISCONTINUED | OUTPATIENT
Start: 2022-09-02 | End: 2022-09-02

## 2022-09-02 RX ORDER — OXYCODONE HYDROCHLORIDE 5 MG/1
5 TABLET ORAL
Status: DISCONTINUED | OUTPATIENT
Start: 2022-09-02 | End: 2022-09-02 | Stop reason: HOSPADM

## 2022-09-02 RX ORDER — ONDANSETRON 2 MG/ML
INJECTION INTRAMUSCULAR; INTRAVENOUS
Status: DISCONTINUED | OUTPATIENT
Start: 2022-09-02 | End: 2022-09-02

## 2022-09-02 RX ORDER — SODIUM CHLORIDE 0.9 % (FLUSH) 0.9 %
3 SYRINGE (ML) INJECTION
Status: DISCONTINUED | OUTPATIENT
Start: 2022-09-02 | End: 2022-09-02 | Stop reason: HOSPADM

## 2022-09-02 RX ADMIN — FENTANYL CITRATE 50 MCG: 50 INJECTION, SOLUTION INTRAMUSCULAR; INTRAVENOUS at 07:09

## 2022-09-02 RX ADMIN — ONDANSETRON HYDROCHLORIDE 4 MG: 2 INJECTION INTRAMUSCULAR; INTRAVENOUS at 08:09

## 2022-09-02 RX ADMIN — HYDROMORPHONE HYDROCHLORIDE 0.4 MG: 2 INJECTION INTRAMUSCULAR; INTRAVENOUS; SUBCUTANEOUS at 08:09

## 2022-09-02 RX ADMIN — LIDOCAINE HYDROCHLORIDE 75 MG: 20 INJECTION, SOLUTION INTRAVENOUS at 07:09

## 2022-09-02 RX ADMIN — MUPIROCIN: 20 OINTMENT TOPICAL at 06:09

## 2022-09-02 RX ADMIN — SUGAMMADEX 200 MG: 100 INJECTION, SOLUTION INTRAVENOUS at 08:09

## 2022-09-02 RX ADMIN — SODIUM CHLORIDE, SODIUM LACTATE, POTASSIUM CHLORIDE, AND CALCIUM CHLORIDE: 600; 310; 30; 20 INJECTION, SOLUTION INTRAVENOUS at 06:09

## 2022-09-02 RX ADMIN — ESMOLOL HYDROCHLORIDE 10 MG: 10 INJECTION INTRAVENOUS at 06:09

## 2022-09-02 RX ADMIN — FENTANYL CITRATE 100 MCG: 50 INJECTION, SOLUTION INTRAMUSCULAR; INTRAVENOUS at 07:09

## 2022-09-02 RX ADMIN — LIDOCAINE HYDROCHLORIDE 25 MG: 20 INJECTION, SOLUTION INTRAVENOUS at 08:09

## 2022-09-02 RX ADMIN — GLYCOPYRROLATE 0.2 MG: 0.2 INJECTION, SOLUTION INTRAMUSCULAR; INTRAVITREAL at 07:09

## 2022-09-02 RX ADMIN — ROCURONIUM BROMIDE 30 MG: 10 INJECTION, SOLUTION INTRAVENOUS at 07:09

## 2022-09-02 RX ADMIN — PROPOFOL 100 MG: 10 INJECTION, EMULSION INTRAVENOUS at 07:09

## 2022-09-02 RX ADMIN — ESMOLOL HYDROCHLORIDE 20 MG: 10 INJECTION INTRAVENOUS at 08:09

## 2022-09-02 RX ADMIN — DEXAMETHASONE SODIUM PHOSPHATE 4 MG: 4 INJECTION, SOLUTION INTRAMUSCULAR; INTRAVENOUS at 07:09

## 2022-09-02 RX ADMIN — ESMOLOL HYDROCHLORIDE 20 MG: 10 INJECTION INTRAVENOUS at 07:09

## 2022-09-02 RX ADMIN — SODIUM CHLORIDE, SODIUM LACTATE, POTASSIUM CHLORIDE, AND CALCIUM CHLORIDE: 600; 310; 30; 20 INJECTION, SOLUTION INTRAVENOUS at 08:09

## 2022-09-02 NOTE — PLAN OF CARE
Barbie Kumar has met all discharge criteria from Phase II. Vital Signs are stable, ambulating  without difficulty. Discharge instructions given, patient verbalized understanding. Discharged from facility via wheelchair in stable condition.

## 2022-09-02 NOTE — INTERVAL H&P NOTE
The patient has been examined and the H&P has been reviewed:    I concur with the findings and no changes have occurred since H&P was written.    Surgery risks, benefits and alternative options discussed and understood by patient.    I have reviewed the planned procedure with the patient.

## 2022-09-02 NOTE — H&P
[facility]  Obstetrics & Gynecology  History & Physical    Patient Name: Barbie Kumar  MRN: [id number]  Admission Date: 2022  Primary Care Provider: Milton Sanchez MD    Subjective:     Chief Complaint/Reason for Admission: Sterilization    History of Present Illness:   Ms. Santiago is a 31 year old female who presents for laparoscopic tubal ligation for unwanted fertility.     No current facility-administered medications on file prior to encounter.     Current Outpatient Medications on File Prior to Encounter   Medication Sig    ferrous gluconate (FERGON) 324 MG tablet Take 1 tablet (324 mg total) by mouth 2 (two) times a day.    ibuprofen (ADVIL,MOTRIN) 600 MG tablet Take 1 tablet (600 mg total) by mouth every 6 (six) hours as needed for Pain.    [DISCONTINUED] hydrocortisone 2.5 % cream Apply topically 2 (two) times daily. (Patient not taking: Reported on 3/25/2022)       Review of patient's allergies indicates:   Allergen Reactions    Phenergan vc [promethazine-phenylephrine] Anxiety    Percocet [oxycodone-acetaminophen] Anxiety     Can take  ibuprofen, hydrocodone    Tylenol [acetaminophen] Nausea Only       Past Medical History:   Diagnosis Date    Abnormal Pap smear     Arrhythmia     Condyloma     Tuberculosis      OB History    Para Term  AB Living   2 2 2     2   SAB IAB Ectopic Multiple Live Births         0 2      # Outcome Date GA Lbr Morgan/2nd Weight Sex Delivery Anes PTL Lv   2 Term 22 39w1d  3.54 kg (7 lb 12.9 oz) M Vag-Spont EPI N BIRGIT      Complications: Fetal Intolerance   1 Term 10/18/15 38w3d / 00:56 3.118 kg (6 lb 14 oz) F Vag-Spont EPI N BIRGIT      Birth Comments: The pregnancy was complicated by condyloma, shortened cervix, and prolonged rupture of membranes. Delivery was complicated by PROM and decreased fetal heart rate necessitating vacuum assist. R Shoulder dystocia. Also with terminal meconium.     Past Surgical History:   Procedure Laterality Date     NO PAST SURGERIES       Family History       Problem Relation (Age of Onset)    Diabetes Paternal Grandmother, Mother, Maternal Grandmother, Maternal Grandfather, Paternal Grandfather    Hypertension Paternal Grandmother, Father, Maternal Grandmother, Maternal Grandfather, Paternal Grandfather          Tobacco Use    Smoking status: Never    Smokeless tobacco: Never   Substance and Sexual Activity    Alcohol use: No     Alcohol/week: 0.0 standard drinks    Drug use: No    Sexual activity: Yes     Partners: Male     Review of Systems   Constitutional:  Negative for fever.   Respiratory:  Negative for shortness of breath.    Cardiovascular:  Negative for chest pain.   Gastrointestinal:  Negative for abdominal pain, nausea and vomiting.   Endocrine: Negative for hot flashes.   Genitourinary:  Negative for menstrual problem.   Integumentary:  Negative for breast mass, nipple discharge and breast skin changes.   Neurological:  Negative for headaches.   Hematological:  Does not bruise/bleed easily.   Psychiatric/Behavioral:  Negative for depression.    Breast: Negative for mass, mastodynia, nipple discharge and skin changes  Objective:     Vital Signs (Most Recent):  Temp: 97.8 °F (36.6 °C) (09/02/22 0606)  Pulse: 76 (09/02/22 0606)  Resp: 16 (09/02/22 0606)  BP: 109/64 (09/02/22 0606)  SpO2: 99 % (09/02/22 0606) Vital Signs (24h Range):  Temp:  [97.8 °F (36.6 °C)] 97.8 °F (36.6 °C)  Pulse:  [76] 76  Resp:  [16] 16  SpO2:  [99 %] 99 %  BP: (109)/(64) 109/64     Weight: 49.9 kg (110 lb)  Body mass index is 21.48 kg/m².  Patient's last menstrual period was 08/13/2022 (exact date).    Physical Exam:   Constitutional: She is oriented to person, place, and time. She appears well-developed and well-nourished.    HENT:   Head: Normocephalic and atraumatic.    Eyes: Conjunctivae are normal.     Cardiovascular:  Normal rate.             Pulmonary/Chest: No respiratory distress.        Abdominal: Soft. She exhibits no distension.              Musculoskeletal: Normal range of motion.       Neurological: She is alert and oriented to person, place, and time.    Skin: Skin is warm and dry.    Psychiatric: She has a normal mood and affect.     Laboratory:  I have personallly reviewed all pertinent lab results from the last 24 hours.    Diagnostic Results:  Labs reviewed    Assessment/Plan:     There are no hospital problems to display for this patient.    Unwanted fertility/Encounter for sterilization  - Patient does not desire future fertility  - Consents signed for diagnostic laparoscopy, bilateral tubal ligation and any other indicated procedures  - Risks, benefits and alternatives discussed and patient verbalized understanding  - To OR for diagnostic laparoscopy, bilateral tubal ligation and any other indicated procedures      Margoth Fernandes MD PGY1  Obstetrics & Gynecology  [facility]

## 2022-09-02 NOTE — OP NOTE
OPERATIVE REPORT    DATE OF OPERATION: 22     PREOPERATIVE DIAGNOSIS:   1. Undesired Fertility.     POSTOPERATIVE DIAGNOSIS:   Same    OPERATION PERFORMED:   Bilateral tubal ligation    SURGEON:  Milton Sanchez MD     ASSISTANT: Rylie Darby (PGY-3)    ANESTHESIA: General.     COMPLICATIONS: None.     EBL: Minimal    IVF: see anesthesia report    UOP: see anesthesia report    FINDINGS:   Cervix did not pull. Normal appearing uterus, ovaries, fallopian tubes. A 3cm segment of each fallopian tube was cauterized. Hemostasis was noted at the completion of the procedure.    SPECIMENS:  None    INDICATION FOR OPERATION:   This patient is a 31-year-old  2, para 2 with undesired fertility. Full details and risks of the procedure were discussed at length with the patient, including permanence of the procedure, and the patient was in full agreement. She did not desire salpingectomy but desired bilateral tubal ligation    PROCEDURE IN DETAIL:   The patient was taken to the operating room, where general anesthesia was administered and found to be adequate. She was then placed in dorsal lithotomy position using Joel stirrups, and prepped and draped in the usual sterile manner, both vaginally and abdominally. A surgical timeout was performed with patient's name, date of birth, allergies, and procedure to be performed verbalized. All OR staff were in agreement. Nono preoperative antibiotics were administered as none were indicated.    Speculum was then placed in the vagina, and the anterior lip of the cervix was grasped with a single tooth tenaculum. The cervix did not pull. Then, a acorn uterine manipulator was placed in through the cervix. The speculum was then removed. A pabon catheter was also placed.    Attention was then turned to the abdomen where perforating towel clamps were placed on either side of the umbilicus, and the anterior abdominal wall was tented upward. A Veress needle was introduced into the  peritoneal cavity with placement confirmed via saline drop test. A pneumoperitoneum was then created with CO2 gas. Then, an incision was made below the umbilicus. A 5 mm trocar was introduced through the infraumbilical incision site under direct visualization. This showed good entry to the peritoneal cavity. The abdominal cavity was thoroughly surveyed. No abnormalities noted. Normal appearing uterus, ovaries, and fallopian tubes were noted.    Then, an incision was made in the right lower quadrant, and 8mm trocar was introduced under direct visualization. Good hemostasis was noted and there was no evidence of injury from abdominal entry. A Falope Ring Applicator was introduced into the abdominal cavity. Ligation of the left fallopian tube attempted, however the Falope Ring Applicator failed to deploy. Decision was made to cauterize bilateral tubes using the Ligasure. A 3cm portion of each fallopian tube was fully and completely cauterized full thickness. Excellent hemostasis was noted. Irrigation and suctioning was performed and there was noted to be good hemostasis. The right and left ovary appeared to be within normal limits.     The procedure was then completed by removing the right lateral trocar. under direct visualization. The laparoscope was removed and the abdomen was deflated. Finally, the infraumbilical trocar sheath was removed. The skin was closed with 4-0 monocryl in a subcuticular fashion. All incisions were covered with Steri-Strips and small bandages.     All the instruments were removed from the cervix and vagina. Sponge, instrument, and needle counts were correct x2. The patient was awakened from anesthesia without difficulty and was transferred to the recovery room in stable condition.    Rylie Darby MD PGY3  Obstetrics and Gynecology

## 2022-09-02 NOTE — TELEPHONE ENCOUNTER
Called patient:    S/P SHANNON this morning.    No answer on patient's phone- unable to leave message - mailbox full.    Spoke to patient's mother (her designated contact for surgery) - reports that she is doing well.

## 2022-09-02 NOTE — DISCHARGE SUMMARY
[facility]  Brief Op Note  Short Stay    Admit Date: 9/2/2022    Attending Physician: Milton Sanchez MD     Surgery Date: 9/2/2022     Surgeon(s) and Role:     * Milton Sanchez MD - Primary    Assisting Physician: Rylie Darby MD PGY3    Pre-op Diagnosis:  Encounter for sterilization [Z30.2]    Post-op Diagnosis:  Post-Op Diagnosis Codes:     * Encounter for sterilization [Z30.2]    Procedure(s) (LRB):  LIGATION, FALLOPIAN TUBE, LAPAROSCOPIC (N/A)    Anesthesia: General    Findings/Key Components: Cervix did not pull. Normal appearing uterus, ovaries, fallopian tubes. A 3cm segment of each fallopian tube was cauterized. Hemostasis was noted at the completion of the procedure.    Estimated Blood Loss: Minimal         Specimens:   Specimen (24h ago, onward)      None                Discharge Note    Discharge Date: 09/02/2022    Discharge Provider: Rylie Darby    Diagnoses:  Active Hospital Problems    Diagnosis  POA    *Status post bilateral salpingectomy [Z90.79]  Not Applicable      Resolved Hospital Problems   No resolved problems to display.       Discharged Condition: good    Hospital Course:   Patient was admitted for outpatient procedure as above, and tolerated the procedure well with no complications. Please see operative report for further details. Following the procedure, the patient was awakened from anesthesia and transferred to the recovery area in stable condition. She was discharged to home once ambulating, voiding, tolerating PO intake, and pain was well-controlled. Patient was given routine post-op instructions and prescriptions for pain medication to take as needed. Patient instructed to follow up with Dr Sanchez in 6 weeks.    Final Diagnoses: Same as principal problem.    Disposition: Home or Self Care    Follow up/Patient Instructions:    Medications:  Reconciled Home Medications:      Medication List        START taking these medications      HYDROcodone-acetaminophen  5-325 mg per tablet  Commonly known as: NORCO  Take 1 tablet by mouth every 4 (four) hours as needed for Pain.            CHANGE how you take these medications      * ibuprofen 600 MG tablet  Commonly known as: ADVIL,MOTRIN  Take 1 tablet (600 mg total) by mouth every 6 (six) hours as needed for Pain.  What changed: Another medication with the same name was added. Make sure you understand how and when to take each.     * ibuprofen 600 MG tablet  Commonly known as: ADVIL,MOTRIN  Take 1 tablet (600 mg total) by mouth 3 (three) times daily.  What changed: You were already taking a medication with the same name, and this prescription was added. Make sure you understand how and when to take each.           * This list has 2 medication(s) that are the same as other medications prescribed for you. Read the directions carefully, and ask your doctor or other care provider to review them with you.                CONTINUE taking these medications      ferrous gluconate 324 MG tablet  Commonly known as: FERGON  Take 1 tablet (324 mg total) by mouth 2 (two) times a day.            ASK your doctor about these medications      hydrocortisone 2.5 % cream  Apply topically 2 (two) times daily.            Discharge Procedure Orders   Diet general     Lifting restrictions   Order Comments: No lifting greater than 15 pounds for six weeks.     Other restrictions (specify):   Order Comments: PELVIC REST:  No douching, tampons, or intercourse for 6 weeks.    If prescribed, vaginal estrogen cream may be used during the postoperative period.     DRIVING:  No driving while on narcotics. Driving may be resumed initially with a competent passenger one to two weeks after surgery if no longer taking narcotics.     EXERCISE:  For six weeks your exercise should be limited to walking. You may walk as far as you wish, as long as you increase your level of exertion gradually and avoid slippery surfaces. You may climb stairs as needed to get around,  but should not use stair climbing for exercise.     Remove dressing in 24 hours   Order Comments: If you have a bandage on wound, you may remove it the day after dismissal.  If you had steri-strips remove them once they begin to peel off (usually 2 weeks). Keep incision clean and dry.  Inspect the incision daily for signs and symptoms of infection.     Wound care routine (specify)   Order Comments: WOUND CARE:  If you have a band-aid or bandage on your wound, you may remove it the day after dismissal.  You may wash the wound with mild soap and water.   You may shower at any time but should avoid immersing any abdominal incisions in water for at least two weeks after surgery or until the wound is completely healed. If given, please shower with Hibiclens soap until bottle is completely finished. Keep your wound clean and dry.  You should observe your incision for signs of infection which include redness, warmth, drainage or fever.     Call MD for:  temperature >100.4     Call MD for:  persistent nausea and vomiting     Call MD for:  severe uncontrolled pain     Call MD for:  difficulty breathing, headache or visual disturbances     Call MD for:  redness, tenderness, or signs of infection (pain, swelling, redness, odor or green/yellow discharge around incision site)     Call MD for:  hives     Call MD for:   Order Comments: inability to void,urine is ketchup colored or you have large clots, vaginal bleeding is heavier than a period.    VAGINAL DISCHARGE: You may develop a vaginal discharge and intermittent vaginal spotting after surgery and up to 6 weeks postoperatively.  The discharge may have an odor and may change in color but it is normal.  This is due to dissolving stiches.  Contact your surgical team if you develop vaginal or vulvar irritation along with a discharge.  Also contact your surgical team if you have vaginal discharge that smells like urine or stool.    CONSTIPATION REMEDIES: Patients are often  constipated after surgery or with use of oral narcotic medicine. You should continue to take the stool softener, Senokot-S during the next six weeks, and consume adequate amounts of water.  If you have not had a bowel movement for 3 days after dismissal, or are uncomfortable and unable to pass stool, please try one or all of the following measures:  1.  Milk of Magnesia - 30 cc by mouth every 12 hours   2.  Dulcolax suppository - One suppository per rectum every 4-6 hours   3.  Metamucil, Fibercon or other bulk former - use as directed  4.  Fleets Enema        PAIN MEDICATIONS:     Take your pain medications as instructed. It is best to take pain medications before your pain becomes severe. This will allow you to take less medication yet have better pain relief. For the first 2 or 3 days it may be helpful to take your pain medications on a regular schedule (e.g. every 4 to 6 hours). This will help you to keep your pain under better control. You should then begin to take fewer medications each day until you no longer need them. Do not take pain medication on an empty stomach. This may lead to nausea and vomiting.     Activity as tolerated   Order Comments: Return to normal activity slowly as you feel able.  For 6 weeks your exercise should be limited to walking.  You may walk as far as you wish, as long as you increase your level of exertion gradually and avoid slippery surfaces.    If you had a hysterectomy at the surgery do not insert anything in your vagina for 9 weeks.     Shower on day dressing removed (No bath)   Order Comments: You may shower at any time but should avoid immersing any abdominal incisions in water for at least 2 weeks after surgery or until the wound is completely healed.  If given, please shower with Hibaclens soap until bottle is completely finish      Follow-up Information       Milton Sanchez MD. Schedule an appointment as soon as possible for a visit in 4 week(s).    Specialties:  Obstetrics, Obstetrics and Gynecology  Why: Postop Appointment  Contact information:  [address] [address]  [phone]                           Rylie Darby MD PGY-3  Obstetrics and Gynecology

## 2022-09-02 NOTE — ANESTHESIA POSTPROCEDURE EVALUATION
Anesthesia Post Evaluation    Patient: Barbie Kumar    Procedure(s) Performed: Procedure(s) (LRB):  LIGATION, FALLOPIAN TUBE, LAPAROSCOPIC (N/A)    Final Anesthesia Type: general      Patient location during evaluation: PACU  Patient participation: Yes- Able to Participate  Level of consciousness: awake and alert  Post-procedure vital signs: reviewed and stable  Pain management: adequate  Airway patency: patent    PONV status at discharge: No PONV  Anesthetic complications: no      Cardiovascular status: blood pressure returned to baseline  Respiratory status: spontaneous ventilation  Hydration status: euvolemic  Follow-up not needed.          Vitals Value Taken Time   /67 09/02/22 0906   Temp 36.5 °C (97.7 °F) 09/02/22 0833   Pulse 112 09/02/22 0908   Resp 17 09/02/22 0833   SpO2 100 % 09/02/22 0908   Vitals shown include unvalidated device data.      No case tracking events are documented in the log.      Pain/James Score: Pain Rating Prior to Med Admin: 0 (9/2/2022  8:33 AM)  Pain Rating Post Med Admin: 0 (9/2/2022  8:33 AM)  James Score: 7 (9/2/2022  8:45 AM)

## 2022-09-02 NOTE — TRANSFER OF CARE
Anesthesia Transfer of Care Note    Patient: Barbie Kumar    Procedure(s) Performed: Procedure(s) (LRB):  LIGATION, FALLOPIAN TUBE, LAPAROSCOPIC (N/A)    Patient location: PACU    Anesthesia Type: general    Transport from OR: Transported from OR on 2-3 L/min O2 by NC with adequate spontaneous ventilation    Post pain: adequate analgesia    Post assessment: no apparent anesthetic complications    Post vital signs: stable    Level of consciousness: awake    Nausea/Vomiting: no nausea/vomiting    Complications: none    Transfer of care protocol was followed      Last vitals:   Visit Vitals  /64 (BP Location: Right arm, Patient Position: Lying)   Pulse 76   Temp 36.6 °C (97.8 °F) (Oral)   Resp 16   Ht 5' (1.524 m)   Wt 49.9 kg (110 lb)   LMP 08/13/2022 (Exact Date)   SpO2 99%   Breastfeeding No   BMI 21.48 kg/m²

## 2022-09-03 ENCOUNTER — PATIENT MESSAGE (OUTPATIENT)
Dept: SURGERY | Facility: OTHER | Age: 32
End: 2022-09-03
Payer: OTHER GOVERNMENT

## 2022-09-03 VITALS
TEMPERATURE: 98 F | SYSTOLIC BLOOD PRESSURE: 120 MMHG | HEART RATE: 90 BPM | WEIGHT: 110 LBS | HEIGHT: 60 IN | OXYGEN SATURATION: 100 % | BODY MASS INDEX: 21.6 KG/M2 | DIASTOLIC BLOOD PRESSURE: 60 MMHG | RESPIRATION RATE: 17 BRPM

## 2022-09-06 ENCOUNTER — TELEPHONE (OUTPATIENT)
Dept: OBSTETRICS AND GYNECOLOGY | Facility: CLINIC | Age: 32
End: 2022-09-06
Payer: OTHER GOVERNMENT

## 2022-09-06 NOTE — TELEPHONE ENCOUNTER
Called patient:    S/P LBTL 9/2/22    Previous post-op call on 9/2/22    Doing much better today.  Incisions with less discomfort.  No nausea.  No bowel / bladder complaints.    No fever.    Reviewed post-op instructions.    Follow-up in office in 2-3 weeks.

## 2022-09-23 ENCOUNTER — PATIENT MESSAGE (OUTPATIENT)
Dept: SURGERY | Facility: OTHER | Age: 32
End: 2022-09-23
Payer: OTHER GOVERNMENT

## 2022-10-05 ENCOUNTER — TELEPHONE (OUTPATIENT)
Dept: OBSTETRICS AND GYNECOLOGY | Facility: CLINIC | Age: 32
End: 2022-10-05
Payer: OTHER GOVERNMENT

## 2022-10-05 NOTE — TELEPHONE ENCOUNTER
Patient would like to reschedule for 3:45 Friday at [facility]. Patient has no symptoms or concerns.

## 2022-10-05 NOTE — TELEPHONE ENCOUNTER
----- Message from Ryder David sent at 10/5/2022 12:53 PM CDT -----  Regarding: CAll Abck  Name of Who is Calling:ZURI CASTANON [[id number]]              What is the request in detail: Patient requesting to reschedule her appointment from 10- to 10-.any location  Please assist              Can the clinic reply by [facility]: No              What Number to Call Back if not in [facility]:[phone]

## 2022-10-07 ENCOUNTER — OFFICE VISIT (OUTPATIENT)
Dept: OBSTETRICS AND GYNECOLOGY | Facility: CLINIC | Age: 32
End: 2022-10-07
Attending: OBSTETRICS & GYNECOLOGY
Payer: OTHER GOVERNMENT

## 2022-10-07 VITALS
DIASTOLIC BLOOD PRESSURE: 62 MMHG | WEIGHT: 103.81 LBS | SYSTOLIC BLOOD PRESSURE: 118 MMHG | BODY MASS INDEX: 20.38 KG/M2 | HEIGHT: 60 IN

## 2022-10-07 DIAGNOSIS — Z98.51 HISTORY OF BILATERAL TUBAL LIGATION: ICD-10-CM

## 2022-10-07 DIAGNOSIS — Z98.890 POSTOPERATIVE STATE: Primary | ICD-10-CM

## 2022-10-07 PROCEDURE — 99213 OFFICE O/P EST LOW 20 MIN: CPT | Mod: PBBFAC | Performed by: OBSTETRICS & GYNECOLOGY

## 2022-10-07 PROCEDURE — 99999 PR PBB SHADOW E&M-EST. PATIENT-LVL III: ICD-10-PCS | Mod: PBBFAC,,, | Performed by: OBSTETRICS & GYNECOLOGY

## 2022-10-07 PROCEDURE — 99024 PR POST-OP FOLLOW-UP VISIT: ICD-10-PCS | Mod: ,,, | Performed by: OBSTETRICS & GYNECOLOGY

## 2022-10-07 PROCEDURE — 99024 POSTOP FOLLOW-UP VISIT: CPT | Mod: ,,, | Performed by: OBSTETRICS & GYNECOLOGY

## 2022-10-07 PROCEDURE — 99999 PR PBB SHADOW E&M-EST. PATIENT-LVL III: CPT | Mod: PBBFAC,,, | Performed by: OBSTETRICS & GYNECOLOGY

## 2022-10-07 NOTE — PROGRESS NOTES
CC: Postop visit    Barbie Kumar is a 31 y.o. female  who is 5 weeks post-op from a LBTL on 22.  Patient is doing well postoperatively.  No pain.  No bowel or bladder complaints.  No fever.      21 Pap: Negative, HPV: Negative    Past Medical History:   Diagnosis Date    Abnormal Pap smear     Arrhythmia     Condyloma     Tuberculosis      Past Surgical History:   Procedure Laterality Date    LAPAROSCOPIC LIGATION OF FALLOPIAN TUBE N/A 2022    Procedure: LIGATION, FALLOPIAN TUBE, LAPAROSCOPIC;  Surgeon: Milton Sanchez MD;  Location: [facility];  Service: OB/GYN;  Laterality: N/A;    NO PAST SURGERIES       Family History   Problem Relation Age of Onset    Hypertension Paternal Grandmother     Diabetes Paternal Grandmother     Hypertension Father     Diabetes Mother     Hypertension Maternal Grandmother     Diabetes Maternal Grandmother     Hypertension Maternal Grandfather     Diabetes Maternal Grandfather     Hypertension Paternal Grandfather     Diabetes Paternal Grandfather     Ovarian cancer Neg Hx     Colon cancer Neg Hx     Breast cancer Neg Hx     Stroke Neg Hx      Social History     Tobacco Use    Smoking status: Never    Smokeless tobacco: Never   Substance Use Topics    Alcohol use: Yes     Comment: socially    Drug use: No     OB History    Para Term  AB Living   2 2 2     2   SAB IAB Ectopic Multiple Live Births         0 2      # Outcome Date GA Lbr Morgan/2nd Weight Sex Delivery Anes PTL Lv   2 Term 22 39w1d  3.54 kg (7 lb 12.9 oz) M Vag-Spont EPI N BIRGIT      Complications: Fetal Intolerance   1 Term 10/18/15 38w3d / 00:56 3.118 kg (6 lb 14 oz) F Vag-Spont EPI N BIRGIT      Birth Comments: The pregnancy was complicated by condyloma, shortened cervix, and prolonged rupture of membranes. Delivery was complicated by PROM and decreased fetal heart rate necessitating vacuum assist. R Shoulder dystocia. Also with terminal meconium.       /62   Ht 5' (1.524 m)   Wt  47.1 kg (103 lb 13.4 oz)   LMP 09/11/2022 (Exact Date)   Breastfeeding No   BMI 20.28 kg/m²     ROS:  GENERAL: No fever, chills, fatigability or weight loss.  VULVAR: No pain, no lesions and no itching.  VAGINAL: No relaxation, no itching, no discharge, no abnormal bleeding and no lesions.  ABDOMEN: No abdominal pain. Denies nausea. Denies vomiting. No diarrhea. No constipation  BREAST: Denies pain. No lumps. No discharge.  URINARY: No incontinence, no nocturia, no frequency and no dysuria.  CARDIOVASCULAR: No chest pain. No shortness of breath. No leg cramps.  NEUROLOGICAL: No headaches. No vision changes.    PE:   ABDOMEN:  Soft, non-tender, non-distended.  INCISIONS: Well healed, no sign of infection, stitch removed.  PELVIC: Normal external genitalia without lesions.  Normal hair distribution.  Adequate perineal body, normal urethral meatus.  Vagina moist and well rugated without lesions or discharge.  Cervix pink, without lesions, discharge or tenderness.  No significant cystocele or rectocele.  Bimanual exam shows uterus to be normal size, regular, mobile and nontender.  Adnexa without masses or tenderness.        ICD-10-CM ICD-9-CM    1. Postoperative state  Z98.890 V45.89       2. History of bilateral tubal ligation  Z98.51 V26.51           Doing well 5 weeks S/P LBTL  May resume normal activities  Return for annual exam

## 2023-01-01 NOTE — ANESTHESIA PROCEDURE NOTES
Intubation    Date/Time: 9/2/2022 7:07 AM  Performed by: Sol Earl CRNA  Authorized by: Campos Garg MD     Intubation:     Induction:  Intravenous    Intubated:  Postinduction    Mask Ventilation:  Easy mask    Attempts:  1    Attempted By:  Staff anesthesiologist    Method of Intubation:  Video laryngoscopy    Blade:  Mccurdy 3    Laryngeal View Grade: Grade I - full view of cords      Difficult Airway Encountered?: No      Complications:  None    Airway Device:  Oral endotracheal tube    Airway Device Size:  7.5    Style/Cuff Inflation:  Cuffed (inflated to minimal occlusive pressure)    Inflation Amount (mL):  6    Tube secured:  22    Secured at:  The teeth    Placement Verified By:  Capnometry    Complicating Factors:  Overbite    Findings Post-Intubation:  BS equal bilateral and atraumatic/condition of teeth unchanged     9

## 2023-01-23 ENCOUNTER — TELEPHONE (OUTPATIENT)
Dept: OBSTETRICS AND GYNECOLOGY | Facility: CLINIC | Age: 33
End: 2023-01-23
Payer: OTHER GOVERNMENT

## 2023-07-09 ENCOUNTER — PATIENT MESSAGE (OUTPATIENT)
Dept: OBSTETRICS AND GYNECOLOGY | Facility: CLINIC | Age: 33
End: 2023-07-09
Payer: OTHER GOVERNMENT

## 2023-07-10 ENCOUNTER — TELEPHONE (OUTPATIENT)
Dept: OBSTETRICS AND GYNECOLOGY | Facility: CLINIC | Age: 33
End: 2023-07-10
Payer: OTHER GOVERNMENT

## 2023-07-10 NOTE — TELEPHONE ENCOUNTER
We will see you Wednesday at 1:45 7/12/23 to evaluate your symptoms. EP/SX  ===View-only below this line===      ----- Message -----       From:Barbie Kumar       Sent:7/10/2023  9:23 AM CDT         To:Patient Medical Advice Request Message List    Subject:Need a earlier appointment     Yes maam either location is fine anytime after 9:30am       ----- Message -----       From:Oli Day       Sent:7/10/2023  9:20 AM CDT         To:Barbie Kumar    Subject:Need a earlier appointment     Would either location work? Any day better? Let me know and I will try to accommodate your request. Barb      ----- Message -----       From:Barbie Kumar       Sent:7/9/2023  3:25 AM CDT         To:Milton Sanchez MD    Subject:Need a earlier appointment     Hello, is there a way I can set an appointment up for this month, I really need to be seen Im having some irritation going on and quita why

## 2023-07-12 ENCOUNTER — LAB VISIT (OUTPATIENT)
Dept: LAB | Facility: HOSPITAL | Age: 33
End: 2023-07-12
Attending: OBSTETRICS & GYNECOLOGY
Payer: OTHER GOVERNMENT

## 2023-07-12 ENCOUNTER — OFFICE VISIT (OUTPATIENT)
Dept: OBSTETRICS AND GYNECOLOGY | Facility: CLINIC | Age: 33
End: 2023-07-12
Payer: OTHER GOVERNMENT

## 2023-07-12 VITALS
WEIGHT: 100.06 LBS | DIASTOLIC BLOOD PRESSURE: 72 MMHG | SYSTOLIC BLOOD PRESSURE: 110 MMHG | HEIGHT: 60 IN | BODY MASS INDEX: 19.65 KG/M2

## 2023-07-12 DIAGNOSIS — R68.82 DECREASED LIBIDO: ICD-10-CM

## 2023-07-12 DIAGNOSIS — N76.0 VULVOVAGINITIS: Primary | ICD-10-CM

## 2023-07-12 DIAGNOSIS — Z98.51 HISTORY OF BILATERAL TUBAL LIGATION: ICD-10-CM

## 2023-07-12 LAB
ESTRADIOL SERPL-MCNC: 129 PG/ML
FSH SERPL-ACNC: 3.9 MIU/ML
TESTOST SERPL-MCNC: 44 NG/DL (ref 5–73)
TSH SERPL DL<=0.005 MIU/L-ACNC: 0.47 UIU/ML (ref 0.4–4)

## 2023-07-12 PROCEDURE — 99213 OFFICE O/P EST LOW 20 MIN: CPT | Mod: S$PBB,,, | Performed by: OBSTETRICS & GYNECOLOGY

## 2023-07-12 PROCEDURE — 83001 ASSAY OF GONADOTROPIN (FSH): CPT | Performed by: OBSTETRICS & GYNECOLOGY

## 2023-07-12 PROCEDURE — 99213 OFFICE O/P EST LOW 20 MIN: CPT | Mod: PBBFAC,PN | Performed by: OBSTETRICS & GYNECOLOGY

## 2023-07-12 PROCEDURE — 81514 NFCT DS BV&VAGINITIS DNA ALG: CPT | Performed by: OBSTETRICS & GYNECOLOGY

## 2023-07-12 PROCEDURE — 84443 ASSAY THYROID STIM HORMONE: CPT | Performed by: OBSTETRICS & GYNECOLOGY

## 2023-07-12 PROCEDURE — 84403 ASSAY OF TOTAL TESTOSTERONE: CPT | Performed by: OBSTETRICS & GYNECOLOGY

## 2023-07-12 PROCEDURE — 82670 ASSAY OF TOTAL ESTRADIOL: CPT | Performed by: OBSTETRICS & GYNECOLOGY

## 2023-07-12 PROCEDURE — 99999 PR PBB SHADOW E&M-EST. PATIENT-LVL III: CPT | Mod: PBBFAC,,, | Performed by: OBSTETRICS & GYNECOLOGY

## 2023-07-12 PROCEDURE — 36415 COLL VENOUS BLD VENIPUNCTURE: CPT | Mod: PN | Performed by: OBSTETRICS & GYNECOLOGY

## 2023-07-12 PROCEDURE — 99213 PR OFFICE/OUTPT VISIT, EST, LEVL III, 20-29 MIN: ICD-10-PCS | Mod: S$PBB,,, | Performed by: OBSTETRICS & GYNECOLOGY

## 2023-07-12 PROCEDURE — 99999 PR PBB SHADOW E&M-EST. PATIENT-LVL III: ICD-10-PCS | Mod: PBBFAC,,, | Performed by: OBSTETRICS & GYNECOLOGY

## 2023-07-12 RX ORDER — NYSTATIN AND TRIAMCINOLONE ACETONIDE 100000; 1 [USP'U]/G; MG/G
CREAM TOPICAL
Qty: 30 G | Refills: 1 | Status: SHIPPED | OUTPATIENT
Start: 2023-07-12 | End: 2024-07-11

## 2023-07-12 NOTE — PROGRESS NOTES
Chief Complaint   Patient presents with    Vaginal irritation       HPI:  Barbie Kumar is a 32 y.o. female patient  who presents today for evaluation of vulvovaginitis as well decreased libido.  She describes recent usage of fragrant soaps and bubble bath.  For the past 4 days, she reports vulvar irritation and itching as well as a slight increase in discharge.  No fever.  No pelvic pain.  Also, she has noted decreased libido and would like evaluation.  Reports increased stress with two young children.  LBTL on 22.  Patient's last menstrual period was 2023 (exact date).    Blood pressure 110/72, height 5' (1.524 m), weight 45.4 kg (100 lb 1.4 oz), last menstrual period 2023, not currently breastfeeding.    21 Pap: Negative, HPV: Negative    Past Medical History:   Diagnosis Date    Abnormal Pap smear     Arrhythmia     Condyloma     Tuberculosis        Past Surgical History:   Procedure Laterality Date    LAPAROSCOPIC LIGATION OF FALLOPIAN TUBE N/A 2022    Procedure: LIGATION, FALLOPIAN TUBE, LAPAROSCOPIC;  Surgeon: Milton Sanchez MD;  Location: [facility];  Service: OB/GYN;  Laterality: N/A;    NO PAST SURGERIES           ROS:  GENERAL: Feeling well overall.   SKIN: Reports vulva irritation / itching.   HEAD: Denies head injury or headache.   NODES: Denies enlarged lymph nodes.   CHEST: Denies chest pain or shortness of breath.   CARDIOVASCULAR: Denies palpitations or left sided chest pain.   ABDOMEN: No abdominal pain, nausea, vomiting or rectal bleeding.   URINARY: No dysuria or hematuria.  REPRODUCTIVE: See HPI.   BREASTS: Denies pain, lumps, or nipple discharge.   HEMATOLOGIC: No easy bruisability or excessive bleeding.   MUSCULOSKELETAL: Denies joint pain or swelling.   NEUROLOGIC: Denies syncope or weakness.   PSYCHIATRIC: Reports increased stress, decreased libido.    PE:   (chaperone present during entire exam)  APPEARANCE: Well nourished, well developed, in no acute  distress.  ABDOMEN: Soft. No tenderness or masses.   VULVA: Labial irritation bilaterally.  No lesions.   URETHRAL MEATUS: Normal size and location, no lesions, no prolapse.  URETHRA: No masses, tenderness, prolapse or scarring.  VAGINA: Moist and well ruggated.   Mild amount of white discharge.  CERVIX: No lesions and discharge.  UTERUS: Normal size, regular shape, mobile, non-tender, bladder base nontender.      Diagnosis:  1. Vulvovaginitis    2. Decreased libido    3. History of bilateral tubal ligation          PLAN:    Orders Placed This Encounter    Vaginosis Screen by DNA Probe    Estradiol    Follicle Stimulating Hormone    TESTOSTERONE    TSH    nystatin-triamcinolone (MYCOLOG II) cream       Patient was counseled today on vulvovaginitis: etiologies, diagnosis, and treatment.  Her symptoms are essentially all external and she will begin Mycolog cream.  We will contact her in several days for results of the Affirm.  We also discussed libido as a multifactorial issue.  She will initially have hormone levels drawn for baseline evaluation.      Follow-up after testing.  Return for annual exam    I spent a total of 24 minutes on the day of the visit.This includes face to face time and non-face to face time preparing to see the patient (eg, review of tests), Obtaining and/or reviewing separately obtained history, Documenting clinical information in the electronic or other health record, Independently interpreting resultsand communicating results to the patient/family/caregiver, or Care coordination.    This note was created with voice recognition software.  Grammatical, syntax and spelling errors may be inevitable.    Answers submitted by the patient for this visit:  Gynecologic Exam Questionnaire  (Submitted on 7/12/2023)  Chief Complaint: Gynecologic exam  genital itching: Yes  genital lesions: No  genital odor: No  genital rash: No  missed menses: No  pelvic pain: No  vaginal bleeding: No  vaginal discharge:  Yes  Chronicity: new  Onset: in the past 7 days  Frequency: 2 to 4 times per day  Pain severity: no pain  Affected side: both  Pregnant now?: No  abdominal pain: No  anorexia: No  back pain: No  chills: No  constipation: No  diarrhea: No  discolored urine: No  dysuria: No  fever: No  flank pain: No  frequency: No  headaches: Yes  hematuria: No  nausea: No  painful intercourse: No  rash: No  urgency: No  vomiting: No  Passing clots?: No  Passing tissue?: No  treatments tried: warm bath  Improvement on treatment: mild  Sexual activity: sexually active  Partner with STD symptoms: no  Birth control: tubal ligation  Menstrual history: regular  STD: No   section: No  Ectopic pregnancy: No  Endometriosis: No  herpes simplex: No  gynecological surgery: No  menorrhagia: No  metrorrhagia: No  miscarriage: No  ovarian cysts: No  perineal abscess: No  PID: No  terminated pregnancy: No  vaginosis: No

## 2023-07-13 ENCOUNTER — TELEPHONE (OUTPATIENT)
Dept: OBSTETRICS AND GYNECOLOGY | Facility: CLINIC | Age: 33
End: 2023-07-13
Payer: OTHER GOVERNMENT

## 2023-07-13 LAB
BACTERIAL VAGINOSIS DNA: NEGATIVE
CANDIDA GLABRATA DNA: NEGATIVE
CANDIDA KRUSEI DNA: NEGATIVE
CANDIDA RRNA VAG QL PROBE: POSITIVE
T VAGINALIS RRNA GENITAL QL PROBE: NEGATIVE

## 2023-07-13 RX ORDER — FLUCONAZOLE 150 MG/1
150 TABLET ORAL ONCE
Qty: 1 TABLET | Refills: 0 | Status: SHIPPED | OUTPATIENT
Start: 2023-07-13 | End: 2023-07-13

## 2023-07-13 NOTE — TELEPHONE ENCOUNTER
Called patient:    Discuss results of affirm:  Yeast    He is currently using Mycolog cream externally with significant improvement in her symptoms.  Rx Diflucan sent to pharmacy.    We discussed hormone levels:  FSH 3.90, E2 129, TSH 0.471  Testosterone 44    With normal testosterone, we discussed other etiologies for decreased libido.  She plans to discuss with her therapist.

## 2023-10-06 NOTE — TELEPHONE ENCOUNTER
Barbie Kumar  You 1 hour ago (10:51 AM)           He's moving not as much this morning, I'm feeling depressed for the first time,  is there a way I can have him at 35 weeks like be induced, I really can't take this pregnancy anymore          You  Barbie Kumar 1 hour ago (10:14 AM)     DI      How are your symptoms today? Are you feeling baby move well?          Barbie Kumar 18 hours ago (5:28 PM)           I'm not leaking a lot now, its normal, I'll make sure I go if it happens again          You  Barbie Kumar 18 hours ago (5:23 PM)     RAMU Sanchez said if you think your water bag is leaking you need to go in. Are you still ;leaking     Patient scheduled 11/29

## 2024-07-18 NOTE — ED TRIAGE NOTES
Pt presents to the ED via EMS with c/o SOB, chest pain and weakness. EMS reports upon arrival pt was hyperventilating, crying, and SOB. Pt was in ED yesterday with c/o chest pain. Pt was diagnosed with vertigo. Pt reports mid sternal chest pain 10/10. Pt is in no distress, denies pain. Pt reports numbness to to Bilateral hands and feet. Pt placed on cardiac monitor, EKG obtained.   
decreased po intake

## 2025-03-10 NOTE — PLAN OF CARE
Denies pain at this time, n ot feeling ctxs. Will stay for prolonged monitoring.    Detail Level: Zone

## 2025-07-03 ENCOUNTER — PATIENT OUTREACH (OUTPATIENT)
Dept: ADMINISTRATIVE | Facility: HOSPITAL | Age: 35
End: 2025-07-03

## (undated) DEVICE — NDL INSUFFLATION VERRES 120MM

## (undated) DEVICE — BANDAGE ADHESIVE

## (undated) DEVICE — SUT MCRYL PLUS 4-0 PS2 27IN

## (undated) DEVICE — SYR 10CC LUER LOCK

## (undated) DEVICE — PAD PREP CUFFED NS 24X48IN

## (undated) DEVICE — TROCAR ENDOPATH XCEL 5X100MM

## (undated) DEVICE — JELLY SURGILUBE 5GR

## (undated) DEVICE — SEALER LIGASURE LAP 37CM 5MM

## (undated) DEVICE — SOL PVP-I SCRUB 7.5% 4OZ

## (undated) DEVICE — TROCAR ENDOPATH XCEL 8MM 10CM

## (undated) DEVICE — ELECTRODE REM PLYHSV RETURN 9

## (undated) DEVICE — VIDEO RENTAL SERVICE

## (undated) DEVICE — KIT WING PAD POSITIONING

## (undated) DEVICE — CLOSURE SKIN STERI STRIP 1/2X4

## (undated) DEVICE — Device

## (undated) DEVICE — GOWN NONREINF SET-IN SLV XL

## (undated) DEVICE — PACK LAPAROSCOPY BAPTIST

## (undated) DEVICE — UNDERGLOVES BIOGEL PI SIZE 7.5

## (undated) DEVICE — SEE L#120831

## (undated) DEVICE — GLOVE BIOGEL SKINSENSE PI 7.0

## (undated) DEVICE — PAD PERI POST REPLACEMNT

## (undated) DEVICE — SOL BETADINE 5%